# Patient Record
Sex: FEMALE | Race: BLACK OR AFRICAN AMERICAN | NOT HISPANIC OR LATINO | Employment: OTHER | URBAN - METROPOLITAN AREA
[De-identification: names, ages, dates, MRNs, and addresses within clinical notes are randomized per-mention and may not be internally consistent; named-entity substitution may affect disease eponyms.]

---

## 2021-12-30 ENCOUNTER — TELEPHONE (OUTPATIENT)
Dept: NEUROSURGERY | Facility: CLINIC | Age: 31
End: 2021-12-30
Payer: MEDICAID

## 2022-01-05 ENCOUNTER — TELEPHONE (OUTPATIENT)
Dept: NEUROLOGY | Facility: CLINIC | Age: 32
End: 2022-01-05
Payer: MEDICAID

## 2022-01-06 ENCOUNTER — TELEPHONE (OUTPATIENT)
Dept: NEUROLOGY | Facility: CLINIC | Age: 32
End: 2022-01-06
Payer: MEDICAID

## 2022-01-10 ENCOUNTER — OFFICE VISIT (OUTPATIENT)
Dept: NEUROLOGY | Facility: CLINIC | Age: 32
End: 2022-01-10
Payer: MEDICAID

## 2022-01-10 ENCOUNTER — LAB VISIT (OUTPATIENT)
Dept: LAB | Facility: HOSPITAL | Age: 32
End: 2022-01-10
Attending: PSYCHIATRY & NEUROLOGY
Payer: MEDICAID

## 2022-01-10 VITALS
HEIGHT: 65 IN | DIASTOLIC BLOOD PRESSURE: 78 MMHG | HEART RATE: 89 BPM | SYSTOLIC BLOOD PRESSURE: 112 MMHG | BODY MASS INDEX: 32.45 KG/M2

## 2022-01-10 DIAGNOSIS — E66.9 CLASS 1 OBESITY WITH BODY MASS INDEX (BMI) OF 32.0 TO 32.9 IN ADULT, UNSPECIFIED OBESITY TYPE, UNSPECIFIED WHETHER SERIOUS COMORBIDITY PRESENT: ICD-10-CM

## 2022-01-10 DIAGNOSIS — G40.211 LOCALIZATION-RELATED (FOCAL) (PARTIAL) SYMPTOMATIC EPILEPSY AND EPILEPTIC SYNDROMES WITH COMPLEX PARTIAL SEIZURES, INTRACTABLE, WITH STATUS EPILEPTICUS: ICD-10-CM

## 2022-01-10 DIAGNOSIS — G40.919 REFRACTORY EPILEPSY: ICD-10-CM

## 2022-01-10 DIAGNOSIS — G40.919 REFRACTORY EPILEPSY: Primary | ICD-10-CM

## 2022-01-10 LAB
ALBUMIN SERPL BCP-MCNC: 3.6 G/DL (ref 3.5–5.2)
ALP SERPL-CCNC: 78 U/L (ref 55–135)
ALT SERPL W/O P-5'-P-CCNC: 13 U/L (ref 10–44)
ANION GAP SERPL CALC-SCNC: 6 MMOL/L (ref 8–16)
AST SERPL-CCNC: 13 U/L (ref 10–40)
BILIRUB SERPL-MCNC: 0.2 MG/DL (ref 0.1–1)
BUN SERPL-MCNC: 9 MG/DL (ref 6–20)
CALCIUM SERPL-MCNC: 9.3 MG/DL (ref 8.7–10.5)
CHLORIDE SERPL-SCNC: 109 MMOL/L (ref 95–110)
CO2 SERPL-SCNC: 22 MMOL/L (ref 23–29)
CREAT SERPL-MCNC: 0.7 MG/DL (ref 0.5–1.4)
ERYTHROCYTE [DISTWIDTH] IN BLOOD BY AUTOMATED COUNT: 15.1 % (ref 11.5–14.5)
EST. GFR  (AFRICAN AMERICAN): >60 ML/MIN/1.73 M^2
EST. GFR  (NON AFRICAN AMERICAN): >60 ML/MIN/1.73 M^2
GLUCOSE SERPL-MCNC: 80 MG/DL (ref 70–110)
HCT VFR BLD AUTO: 39.9 % (ref 37–48.5)
HGB BLD-MCNC: 12.6 G/DL (ref 12–16)
MCH RBC QN AUTO: 26.8 PG (ref 27–31)
MCHC RBC AUTO-ENTMCNC: 31.6 G/DL (ref 32–36)
MCV RBC AUTO: 85 FL (ref 82–98)
PLATELET # BLD AUTO: 243 K/UL (ref 150–450)
PMV BLD AUTO: 10.9 FL (ref 9.2–12.9)
POTASSIUM SERPL-SCNC: 4.2 MMOL/L (ref 3.5–5.1)
PROT SERPL-MCNC: 7.4 G/DL (ref 6–8.4)
RBC # BLD AUTO: 4.7 M/UL (ref 4–5.4)
SODIUM SERPL-SCNC: 137 MMOL/L (ref 136–145)
WBC # BLD AUTO: 5.59 K/UL (ref 3.9–12.7)

## 2022-01-10 PROCEDURE — 1159F PR MEDICATION LIST DOCUMENTED IN MEDICAL RECORD: ICD-10-PCS | Mod: CPTII,,, | Performed by: PSYCHIATRY & NEUROLOGY

## 2022-01-10 PROCEDURE — 1160F RVW MEDS BY RX/DR IN RCRD: CPT | Mod: CPTII,,, | Performed by: PSYCHIATRY & NEUROLOGY

## 2022-01-10 PROCEDURE — 3078F DIAST BP <80 MM HG: CPT | Mod: CPTII,,, | Performed by: PSYCHIATRY & NEUROLOGY

## 2022-01-10 PROCEDURE — 80053 COMPREHEN METABOLIC PANEL: CPT | Performed by: PSYCHIATRY & NEUROLOGY

## 2022-01-10 PROCEDURE — 3078F PR MOST RECENT DIASTOLIC BLOOD PRESSURE < 80 MM HG: ICD-10-PCS | Mod: CPTII,,, | Performed by: PSYCHIATRY & NEUROLOGY

## 2022-01-10 PROCEDURE — 80201 ASSAY OF TOPIRAMATE: CPT | Performed by: PSYCHIATRY & NEUROLOGY

## 2022-01-10 PROCEDURE — 3008F PR BODY MASS INDEX (BMI) DOCUMENTED: ICD-10-PCS | Mod: CPTII,,, | Performed by: PSYCHIATRY & NEUROLOGY

## 2022-01-10 PROCEDURE — 99999 PR PBB SHADOW E&M-EST. PATIENT-LVL III: ICD-10-PCS | Mod: PBBFAC,,, | Performed by: PSYCHIATRY & NEUROLOGY

## 2022-01-10 PROCEDURE — 3008F BODY MASS INDEX DOCD: CPT | Mod: CPTII,,, | Performed by: PSYCHIATRY & NEUROLOGY

## 2022-01-10 PROCEDURE — 85027 COMPLETE CBC AUTOMATED: CPT | Performed by: PSYCHIATRY & NEUROLOGY

## 2022-01-10 PROCEDURE — 99205 PR OFFICE/OUTPT VISIT, NEW, LEVL V, 60-74 MIN: ICD-10-PCS | Mod: S$PBB,,, | Performed by: PSYCHIATRY & NEUROLOGY

## 2022-01-10 PROCEDURE — 1160F PR REVIEW ALL MEDS BY PRESCRIBER/CLIN PHARMACIST DOCUMENTED: ICD-10-PCS | Mod: CPTII,,, | Performed by: PSYCHIATRY & NEUROLOGY

## 2022-01-10 PROCEDURE — 36415 COLL VENOUS BLD VENIPUNCTURE: CPT | Performed by: PSYCHIATRY & NEUROLOGY

## 2022-01-10 PROCEDURE — 3074F SYST BP LT 130 MM HG: CPT | Mod: CPTII,,, | Performed by: PSYCHIATRY & NEUROLOGY

## 2022-01-10 PROCEDURE — 99213 OFFICE O/P EST LOW 20 MIN: CPT | Mod: PBBFAC | Performed by: PSYCHIATRY & NEUROLOGY

## 2022-01-10 PROCEDURE — 80177 DRUG SCRN QUAN LEVETIRACETAM: CPT | Performed by: PSYCHIATRY & NEUROLOGY

## 2022-01-10 PROCEDURE — 3074F PR MOST RECENT SYSTOLIC BLOOD PRESSURE < 130 MM HG: ICD-10-PCS | Mod: CPTII,,, | Performed by: PSYCHIATRY & NEUROLOGY

## 2022-01-10 PROCEDURE — 99999 PR PBB SHADOW E&M-EST. PATIENT-LVL III: CPT | Mod: PBBFAC,,, | Performed by: PSYCHIATRY & NEUROLOGY

## 2022-01-10 PROCEDURE — 99205 OFFICE O/P NEW HI 60 MIN: CPT | Mod: S$PBB,,, | Performed by: PSYCHIATRY & NEUROLOGY

## 2022-01-10 PROCEDURE — 1159F MED LIST DOCD IN RCRD: CPT | Mod: CPTII,,, | Performed by: PSYCHIATRY & NEUROLOGY

## 2022-01-10 RX ORDER — TOPIRAMATE 200 MG/1
200 TABLET ORAL 2 TIMES DAILY
COMMUNITY
Start: 2021-12-14

## 2022-01-10 RX ORDER — LEVETIRACETAM 1000 MG/1
1000 TABLET ORAL 2 TIMES DAILY
COMMUNITY
Start: 2021-12-14

## 2022-01-10 RX ORDER — MIDAZOLAM 5 MG/.1ML
1 SPRAY NASAL EVERY 10 MIN PRN
Qty: 4 EACH | Refills: 3 | Status: SHIPPED | OUTPATIENT
Start: 2022-01-10

## 2022-01-10 RX ORDER — IBUPROFEN 800 MG/1
800 TABLET ORAL 3 TIMES DAILY PRN
Status: ON HOLD | COMMUNITY
Start: 2021-12-02 | End: 2022-02-07 | Stop reason: HOSPADM

## 2022-01-10 NOTE — PATIENT INSTRUCTIONS
You came to epilepsy clinic because of refractory seizures despite multiple anti-seizure medications. You are interested in getting more information about Epilepsy Surgery. I think you are a good candidate for a possible surgical intervention.  I would like you to meet with our neurosurgeons to discuss the possible surgical interventions so that you understand what kind of procedures are performed and possible risks and benefits.  I would like you to meet our epilepsy  who will help guide you through this process.  Please go to the lab and get drug levels on the way out.     We will need a copy of the images of the MRI brain performed on 10/2020 (at [facility]) and 6/2019 (at [facility]). You can call the facilities that performed these MRIs, talk with the radiology department, and ask for a disk with the images. Please drop these images off at this clinic or mail them to me.      When you talk with the neurosurgeon, she will discuss risks and benefits of the surgery. Because your language is on the left, you may experience some language deficits which may be temporary or permanent. You may also experience worsening of you memory. However, we hope to significantly lower your risk of these complications by first offering a laser ablation of the area which is a significantly less invasive procedure with less associated risks when compared to resection.     At times you have cluster seizures. Try nayzilam spray for seizures lasting more than 3 minutes or more than 3 seizures in a day.     We will discuss you case further at surgical conference on 1/14. You will need to meet with the neurosurgeon before we can proceed.     Do not miss any doses of your medication. If you do miss a dose, take it as soon as you remember even if that means doubling up on the dose. Please get regular sleep. Go to sleep at the same time and wake up at the same time every day. People with epilepsy require more sleep than people without  epilepsy.  Sleeping 10-12 hours a day can be normal for a person with epilepsy.  Give yourself the time you need to get enough sleep. Every seizure you have makes it harder to prevent the next seizure. Epilepsy is associated with SUDEP, or sudden unexpected death in epilepsy.  The risk is significantly higher if your convulsive seizures are not well controlled.    Per [place] law, no episodes of loss of consciousness for 6 months before you may drive.  Please avoid dangerous situations.  For example, no baths/pools by yourself, no heights, no power tools.  Please wear a bike helmet.  If you have a single breakthrough seizure, please patient portal me or call the office and we will decide the next steps. If you have multiple seizures in a row and do not return back to baseline, 911 needs to be called.     Raya Vasquez MD PhD  Neurology-Epilepsy  [facility].

## 2022-01-10 NOTE — LETTER
January 10, 2022      [facility] - Neurology 7th Fl  [address]  [facility] [address]  Phone: [phone]  Fax: [phone]       Patient: Carolyn Mccullough   YOB: 1990  Date of Visit: 01/10/2022    To Whom It May Concern:    Carolyn Mccullough  was at [facility] on 01/10/2022. The patient may return to work with no restrictions. If you have any questions or concerns, or if I can be of further assistance, please do not hesitate to contact me.    Sincerely,    Raya Vasquez MD
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
## 2022-01-10 NOTE — LETTER
January 10, 2022      [facility] - Neurology 7th Fl  [address]  [facility] [address]  Phone: [phone]  Fax: [phone]       Patient: Carolyn Mccullough   YOB: 1990  Date of Visit: 01/10/2022    To Whom It May Concern:    María Mccullough  was at [facility] on 01/10/2022. She was accompanied by her mother, Karina. If you have any questions or concerns, or if I can be of further assistance, please do not hesitate to contact me.    Sincerely,    Raya Vasquez MD

## 2022-01-12 LAB
LEVETIRACETAM SERPL-MCNC: 9.4 UG/ML (ref 3–60)
TOPIRAMATE SERPL-MCNC: 12.5 MCG/ML

## 2022-01-13 ENCOUNTER — TELEPHONE (OUTPATIENT)
Dept: NEUROLOGY | Facility: CLINIC | Age: 32
End: 2022-01-13
Payer: MEDICAID

## 2022-01-13 NOTE — TELEPHONE ENCOUNTER
Placed phone call to patient at [phone] and mother answered.  She reports this is her mother who is currently at work. SW noted he would call [phone] number and mother stated that is the number to call.     Sw called [phone] and a man answered and stated this was not her number.  BERNARD noted the correct number in the system and dialed.

## 2022-01-14 ENCOUNTER — DOCUMENTATION ONLY (OUTPATIENT)
Dept: NEUROLOGY | Facility: CLINIC | Age: 32
End: 2022-01-14
Payer: MEDICAID

## 2022-01-14 DIAGNOSIS — G40.919 REFRACTORY EPILEPSY: ICD-10-CM

## 2022-01-14 PROCEDURE — 99367 TEAM CONF W/O PAT BY PHYS: CPT | Mod: S$PBB,,, | Performed by: PSYCHIATRY & NEUROLOGY

## 2022-01-14 PROCEDURE — 99367 PR TEAM CONFERENCE NON-FACE-TO-FACE PHYSICIAN: ICD-10-PCS | Mod: S$PBB,,, | Performed by: PSYCHIATRY & NEUROLOGY

## 2022-01-14 NOTE — PROGRESS NOTES
Epilepsy Surgery Conference    Attendees:  BRITTANY Handy MD     Date   2022   MRN  [id number]   Name  Carolyn Mccullough     Gender  Female     (Age)   1990 (32yo)   follows w/ Dr. Orellana outpatient; clinic visit with Pedro 01/10/2022   Allergies  Carbamazepine, ?iodinated contrast media possibly causing rash during sEE admission    Physical Exam Normal    BMI  32.45 kg/m²     PMH  None    AEDs Current AED/SEs:  1. Topiramate 200 mg twice daily SE none  (Level 1/10/22 12.5)  2. Levetiracetam 1000 mg twice daily SE none (Level 1/10/22 9.4)   Failed AEDs Previous AED/SEs or reason for DC.   1. Valproic acid  2. Brivaracetam 50 mg twice daily -> hallucinations  3. Vimpat   4. Carbamazepine -> rash    Compliance  adherence: no missed doses   Age of onset   (19yo)   Epilepsy History no family history of seizures, no CNS infections, No childhood seizures, no head strike with LOC, term repeat C section with no issue and no prolonged hospitalization   Semiology Auras: not always with a warning, may have a sensation that a seizure is going to happen consisting of having sensation of seeing actions before they occur; sometimes will be standing and just fall out with no warning     Seizure Events:   1. Oral automatisms (grimacing, teeth grinding, hypersalivation), staring, eyes rolling, which may progress into generalized convulsion with tongue bite; when from sleep associated with an ictal cry     2020 Neuropsychological evaluation: Carolyn presented with diffuse cognitive impairment including deficits in problem solving, reasoning, and comprehension.  These skills were below a 2018 evaluation and her preillness functioning.  Current testing resulted in her difficulties with activities of daily living suggests impaired intellectual abilities.  Carolyn also exhibits deficits in  nonlateralizing cognitive skills including working memory and processing speed.  In contrast, Carolyn showed deficits in word finding, comprehension of language, and use of grammar.  These difficulty suggests greater left hemisphere involvement, particularly Broca's area.  Learning and memory information has improved from the 2018 assessment and her verbal and visual abilities were similarly developed.  For instance, she exhibited an intact ability to learn and retain rote, simplified information but these abilities declined when information increased in complexity.  This pattern suggests that working memory deficits limit her ability to learn lengthier information.  Visual motor skills were relatively strong although impairments were noted in visual spatial judgment and fine motor coordination and speed.  Finally this evaluation did not find emotional or behavioral concerns which suggest improvements over time.     sEEG cfxeji04/29/2020-removed 11/01/2020, discharged 11/05/2020:  Leads placed in 1. Left amygdala (AMG), left hippocampus (HIP), left superior temporal gyrus (STEMP), left superior temporal gyrus2 (STEMP2), left inferior temporal (INTEMP), left posterior temporal (PTEMP) left posterior temporal (PTEMP2).  Interictal epileptiform activity: 1. There is near continuous delta slowing with superimposed fast activity and embedded epileptiform spikes noted over hippocampal area (HIP1,2). 2. There are long runs of sharp waves intermixed with delta slowing noted over the amygdala (AMG 1,2).  Frequent spike waves maximal over superior temporal leads (STEMP1) electrode contacts 2-5.  This activity becomes more rhythmic and periodic in that latter half of the recording.4.  There are intermittent short runs of spike and slow wave noted over inferior temporal area (INTEMP 2-3).  New discharges appeared over PTEMP2 1-3, which were frequent in appearance and often seen in bursts or runs of activity.  Interpretation:   These findings are suggestive of left-sided epileptogenic cerebral dysfunction noted broadly within the left hippocampus and posterior temporal regions as evidence by frequent interictal activity over these cortical regions as well as seizures that localized to these areas.  The attenuation of interictal activity over the HIP 1-2 leads several seconds before clinical and more obvious electrographic onset, could indicate early involvement of that region in the seizure onset.  However at the DC shift noted over the PTEMP1 leads could also indicate an area of epileptogenic onset.    Based on the entirety of this 4 day intracranial EEG monitoring in the multitude of seizures captured, it is hypothesized that the ictal onset zone lays in close proximity to HIP1-2 and PTEMP 1-4.  This patient was discussed with an epilepsy surgical conference, and the decision was made to offer the patient a left anterior temporal lobectomy with amygdalohippocampectomy. (raw seeg personally reviewed)     Leads removed on 11/01/2020 after patient pulled out 4 of 7 needs during an episode of postictal confusion.  She did develop fevers, and a workup was performed including CXR, blood in urine cultures, and a CT Head w/wo contrast.  These were overall unrevealing.  She was started on empiric vancomycin and cefepime by the PICU for her fevers, which subsequently resolved.  Wound Care was consulted for a left back fold wound and wound care was initiated.  Her incision sites from her leads remain c/d/i, with no evidence of leak.  She remained afebrile for 24 hours off antibiotics, and was deemed stable for discharge home.  On 11/04/2020, new and worsening bilateral lower extremity and patchy lower upper extremity redness and swelling without a discrete rash concerning for possible cellulitis.  Dermatology and PICU were consulted, and they were concerned for possible DRESS syndrome, although she has been on her current AEDs for some time.  Given  the complexity of her care and need for further allergy and immunology evaluation, the decision was made to transfer her to an adult hospital for further management of her medical issues.  At this point, there are no further neurosurgical needs in she requires medical management of her bilateral lower extremity edema and erythema.  She was transferred to the hospitalist service at [facility].     MRI brain w/ 10/22/2020 and- [facility]brainlab there is mild parietal predominant cerebral atrophy, moderate diffuse cerebellar atrophy and partially empty sella with pituitary flattened along the floor of the sella.  No additional significant MR abnormality or abnormal enhancement of the intracranial contents is evident.  The ventricles are normal in size and configuration.  No acute intracranial hemorrhage, extra-axial collection, midline shift or herniation is evident.  The paranasal sinuses and mastoid air cells are clear.  The orbits are intact.  There is mild calvarial thickening.  Impression:  Cerebral and cerebellar atrophy with calvarial thickening compatible with chronic sequelae of epilepsy and antiepileptic medications.  No acute intracranial abnormality.     Functional brain MRI 3/2020:  Functional imaging demonstrated left hemispheric dominance for language     EM[facility]:  03/17/2020-03/18/2020; 8-9 hz PDR, persistent low-voltage delta and theta slowing was appreciated broadly over the left temporal region.  At times, this slowing appears rhythmic in nature (TIRDA).  Epileptiform spikes and sharp waves were also appreciated over the left temporal region maximally seen in the T3/T5 leads.  Rarely, volume conduction of these discharges were noted to involve the right parasagittal region.  These discharges were noted to wax and wane in frequency, most notably seen in drowsiness and sleep.  No seizures captured.  Interictal SPECT scan performed 03/17/2020.  Impression:  This is an abnormal 21  hour prolonged EEG with accompanying video monitoring in wakefulness, drowsiness, and sleep due to the following features: 1. Persistent slowing over the left temporal region, frequently noted to be rhythmic in nature (TIRDA), 2. Epileptiform discharges noted maximally over the left mid-temporal region, seen most frequently in drowsiness and sleep.  Interpretation:  These findings are consistent with a focal area of potentially epileptogenic cerebral dysfunction noted broadly over the left mid-temporal region.  There were no seizures noted during this day in the EEG recording.  The patient did have a seizure on 03/16/2020.  Interictal SPECT scan radionucleotide injection was performed during this day of the EEG recording.  Compared to the previous day's study, the slowing noted over the left hemisphere has noted to become more pronounced and persistent.     PET scan 08/14/2019 ([place]):  Unremarkable metabolic brain study in     [facility] 06/23/2020:  Left hemispheric language and memory lateralization (anesthetic agent used: propofol) -> the results of the intracarotid diprivan (modified [place]) test are difficult to interpret considering prolonged generalized sedating propofol (diprivan) effects as compared to previous experience with amobarbital.  Interpretable portions of the test showed left hemispheric language and memory lateralization.     MRI brain 06/17/2019:  Persistent atrophy of bilateral parietal occipital lobes, unchanged; increased frontotemporal and cerebellar atrophy compared to previous studies; no acute intracranial abnormality     [facility] 07/16/2018-07/20/2018:  Two left temporal focal onset seizures with alteration of awareness, abundant focal left mid temporal interictal epileptiform activity, less frequent independent right temporal interictal epileptiform activity     EEG 06/09/2016 - normal     MRI brain 06/09/2016 w w/o small left frontal vascular finding consistent with benign venous  developmental abnormality.  Benign empty sella.       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 Orellana. sEEG with consistent onset in the left mesial temporal structures however broad zone of abnormal activity extending across left temporal lobe structures.  Functional testing lateralized dominant hemisphere to the left with significant baseline deficits. Copies of the MRI images from 2019 and 2020 uploaded and revewed.  Plan:  Reasonable to proceed with left mesial temporal laser ablation considering significant involvement in mesial temporal structures with less, albeit persistent, risk associated with deficits of ipsilateral dominant speech/memory function.      Raya Vasquez MD PhD  Neurology-Epilepsy  [facility].

## 2022-01-18 ENCOUNTER — TELEPHONE (OUTPATIENT)
Dept: NEUROSURGERY | Facility: CLINIC | Age: 32
End: 2022-01-18
Payer: MEDICAID

## 2022-01-18 DIAGNOSIS — G40.211 LOCALIZATION-RELATED (FOCAL) (PARTIAL) SYMPTOMATIC EPILEPSY AND EPILEPTIC SYNDROMES WITH COMPLEX PARTIAL SEIZURES, INTRACTABLE, WITH STATUS EPILEPTICUS: Primary | ICD-10-CM

## 2022-01-19 ENCOUNTER — TELEPHONE (OUTPATIENT)
Dept: NEUROSURGERY | Facility: CLINIC | Age: 32
End: 2022-01-19
Payer: MEDICAID

## 2022-01-19 DIAGNOSIS — G40.211 LOCALIZATION-RELATED (FOCAL) (PARTIAL) SYMPTOMATIC EPILEPSY AND EPILEPTIC SYNDROMES WITH COMPLEX PARTIAL SEIZURES, INTRACTABLE, WITH STATUS EPILEPTICUS: Primary | ICD-10-CM

## 2022-01-19 DIAGNOSIS — Z01.818 PRE-OP TESTING: ICD-10-CM

## 2022-01-19 DIAGNOSIS — G40.211 PARTIAL SYMPTOMATIC EPILEPSY WITH COMPLEX PARTIAL SEIZURES, INTRACTABLE, WITH STATUS EPILEPTICUS: Primary | ICD-10-CM

## 2022-01-20 ENCOUNTER — HOSPITAL ENCOUNTER (OUTPATIENT)
Dept: RADIOLOGY | Facility: HOSPITAL | Age: 32
Discharge: HOME | End: 2022-01-20
Attending: NEUROLOGICAL SURGERY
Payer: MEDICAID

## 2022-01-20 ENCOUNTER — TELEPHONE (OUTPATIENT)
Dept: PREADMISSION TESTING | Facility: HOSPITAL | Age: 32
End: 2022-01-20
Payer: MEDICAID

## 2022-01-20 ENCOUNTER — CLINICAL SUPPORT (OUTPATIENT)
Dept: OPHTHALMOLOGY | Facility: CLINIC | Age: 32
End: 2022-01-20
Payer: MEDICAID

## 2022-01-20 ENCOUNTER — OFFICE VISIT (OUTPATIENT)
Dept: NEUROSURGERY | Facility: CLINIC | Age: 32
End: 2022-01-20
Payer: MEDICAID

## 2022-01-20 DIAGNOSIS — G40.211 LOCALIZATION-RELATED (FOCAL) (PARTIAL) SYMPTOMATIC EPILEPSY AND EPILEPTIC SYNDROMES WITH COMPLEX PARTIAL SEIZURES, INTRACTABLE, WITH STATUS EPILEPTICUS: ICD-10-CM

## 2022-01-20 DIAGNOSIS — G40.211 PARTIAL SYMPTOMATIC EPILEPSY WITH COMPLEX PARTIAL SEIZURES, INTRACTABLE, WITH STATUS EPILEPTICUS: ICD-10-CM

## 2022-01-20 DIAGNOSIS — Z01.818 PREOPERATIVE TESTING: Primary | ICD-10-CM

## 2022-01-20 DIAGNOSIS — G40.919 REFRACTORY EPILEPSY: ICD-10-CM

## 2022-01-20 PROCEDURE — 99205 PR OFFICE/OUTPT VISIT, NEW, LEVL V, 60-74 MIN: ICD-10-PCS | Mod: 57,S$PBB,, | Performed by: NEUROLOGICAL SURGERY

## 2022-01-20 PROCEDURE — 70552 MRI BRAIN STEM W/DYE: CPT | Mod: 26,,, | Performed by: RADIOLOGY

## 2022-01-20 PROCEDURE — 70552 MRI BRAIN STEM W/DYE: CPT | Mod: TC

## 2022-01-20 PROCEDURE — 99999 PR PBB SHADOW E&M-EST. PATIENT-LVL I: CPT | Mod: PBBFAC,,, | Performed by: NEUROLOGICAL SURGERY

## 2022-01-20 PROCEDURE — 99999 PR PBB SHADOW E&M-EST. PATIENT-LVL I: ICD-10-PCS | Mod: PBBFAC,,, | Performed by: NEUROLOGICAL SURGERY

## 2022-01-20 PROCEDURE — 99205 OFFICE O/P NEW HI 60 MIN: CPT | Mod: 57,S$PBB,, | Performed by: NEUROLOGICAL SURGERY

## 2022-01-20 PROCEDURE — A9585 GADOBUTROL INJECTION: HCPCS | Performed by: NEUROLOGICAL SURGERY

## 2022-01-20 PROCEDURE — 70552 MRI BRAIN STEALTH WITHOUT FIDUCIALS: ICD-10-PCS | Mod: 26,,, | Performed by: RADIOLOGY

## 2022-01-20 PROCEDURE — 25500020 PHARM REV CODE 255: Performed by: NEUROLOGICAL SURGERY

## 2022-01-20 PROCEDURE — 99211 OFF/OP EST MAY X REQ PHY/QHP: CPT | Mod: PBBFAC,25 | Performed by: NEUROLOGICAL SURGERY

## 2022-01-20 RX ORDER — GADOBUTROL 604.72 MG/ML
10 INJECTION INTRAVENOUS
Status: COMPLETED | OUTPATIENT
Start: 2022-01-20 | End: 2022-01-20

## 2022-01-20 RX ADMIN — GADOBUTROL 10 ML: 604.72 INJECTION INTRAVENOUS at 02:01

## 2022-01-20 NOTE — PROGRESS NOTES
HVF done. Rel/fix/fair od good os coop. Good ou./chart checked for latex allergy. 3.25/od 3.25/os-smh

## 2022-01-20 NOTE — TELEPHONE ENCOUNTER
----- Message from Enid Mayer RN sent at 1/20/2022  9:45 AM CST -----  Surgery 1/31      other appts 1/20  Please schedule  as per Dr. Iraheta, poc appt, labs and ua.( if able add some to 1/20, but put rest all on same day, lives out of town)    Thanks!

## 2022-01-20 NOTE — PROGRESS NOTES
Neurosurgery  History & Physical    SUBJECTIVE:     Chief Complaint: intractable epilepsy     History of Present Illness:  Carolyn Mccullough is a 31 y.o. right-handed female who presents as a referral from Dr. Kyle Stapleton for intractable left temporal lobe epilepsy. The patient has undergone extensive workup, including sEEG electrode placement at [facility] here in [facility]  that demonstrated all seizures with a mesial temporal onset. I have personally reviewed the EEG data (together with Chelo Stapleton and Pedro) from that time with the patient's permission, and I have also reviewed the patient's intracranial electrode placement plan and re-modeled it in DAMIAN. Today is my first time actually meeting the patient. Please see Dr. Vasquez's notes for extensive history review and thorough reporting of outside hospital records. She has failed multiple previous AEDs.     The patient presents with her mother and her daughter, who is developmentally delayed. The patient's mother, Karina, provides most of the history. The family lives together in [place], [place], which is approximately 2 hours away.     Karina reports that Carolyn experiences about 3 events/months presently. She endorses 2 semiologies: in the first, Carolyn's eyes roll back, she then experiences all-over shaking. She also sometimes has staring with drooling episodes.     Seizure onset was in . They initially started while she was asleep, but now happen at all times of day. They are now moreso when awake than asleep, which is particularly difficult because the seizures interfere with normal activities, such as shopping.     The patient was born via  at term. Karina denies any childhood infections, trauma, or illness. Carolyn graduated from high school and finished one semester of college. She is not currently working; last worked in  in the [employer]    Her goal for surgery is to reduce seizures.      Empty sella configuration noted [facility], so I asked the patient about IIH symptoms: she endorses occasional headaches, but usually only after seizures. There is no headache associated with exertional activities otherwise.    The patient denies any bleeding, infectious, or anesthetic complications with any previous surgery.  She had an allergic reaction to CT contrast and also to tegretol. She is not currently taking ibuprofen.     Review of patient's allergies indicates:   Allergen Reactions    Carbamazepine Rash    Iodinated contrast media Dermatitis    Escitalopram Rash       Current Outpatient Medications   Medication Sig Dispense Refill    ibuprofen (ADVIL,MOTRIN) 800 MG tablet Take 800 mg by mouth 3 (three) times daily as needed.      levETIRAcetam (KEPPRA) 1000 MG tablet Take 1,000 mg by mouth 2 (two) times daily.      midazolam (NAYZILAM) 5 mg/spray (0.1 mL) Spry 1 spray by Nasal route every 10 (ten) minutes as needed (cluster seizures). Please administer 1 spray after seizures lasting more than 3 minutes or more that 3 seizures in a 24 hour period, may administer a second dose after 10 min if there is no response to the first dose. Use for treatment of no more than 1 episode (1-2 sprays) every three days, no more than 5 episodes (1-2 sprays) in a month 4 each 3    topiramate (TOPAMAX) 200 MG Tab Take 200 mg by mouth 2 (two) times daily.       No current facility-administered medications for this visit.       Past Medical History:   Diagnosis Date    Seizures      Past Surgical History:   Procedure Laterality Date    BRAIN SURGERY      sEEG left [facility]     SECTION      TUBAL LIGATION       Family History    None       Social History     Socioeconomic History    Marital status: Legally    Tobacco Use    Smoking status: Never Smoker    Smokeless tobacco: Never Used   Substance and Sexual Activity    Alcohol use: No        Review of Systems   Constitutional: Negative for fever.   HENT: Negative for nosebleeds.    Eyes: Negative for visual disturbance.   Respiratory: Negative for shortness of breath.    Cardiovascular: Negative for chest pain.   Gastrointestinal: Negative for nausea and vomiting.   Endocrine: Negative for cold intolerance.   Genitourinary: Negative for difficulty urinating.   Musculoskeletal: Negative for neck pain.   Skin: Negative for color change.   Neurological: Positive for seizures.   Hematological: Does not bruise/bleed easily.   Psychiatric/Behavioral: Negative for dysphoric mood. The patient is not nervous/anxious.        OBJECTIVE:     Vital Signs     There is no height or weight on file to calculate BMI.      Physical Exam:    Constitutional: She appears well-developed and well-nourished. No distress.     Eyes: EOM are normal.     Abdominal: Soft.     Skin: Skin displays no rash on extremities. Skin displays no lesions on extremities.   Scalp inspected; difficult to appreciate any scars from previous sEEG placement      Psych/Behavior: She is alert. She is oriented to person, place, and time.     Musculoskeletal:        Right Upper Extremities: Muscle strength is 5/5.        Left Upper Extremities: Muscle strength is 5/5.       Right Lower Extremities: Muscle strength is 5/5.        Left Lower Extremities: Muscle strength is 5/5.     Neurological:        Coordination: She has normal finger to nose coordination.     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  female who presents as a referral from Dr. Stapleton to discuss definitive surgery for treatment of intractable epilepsy. She has undergone extensive workup at [facility], and Dr. Stapleton has shared all relevant records with us, including raw sEEG data and neuropsycholgical evaluation. We have also directly obtained the images, including post-sEEG CT head, to assess the electrode placement.     Based on these data, together with the additional imaging findings, and after extensive interdisciplinary discussion in conference and with Dr. Stapleton, I have offered the patient a choice between laser ablation of the amygdala and hippocampus or anterior temporal lobectomy. 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 There is a growing body of literature suggesting that encephaloceles are potentially epileptogenic; however, without actually having an electrode in the region, I cannot be sure if it is contributory in her case.     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 We discussed that this will carry an increased risk of neuropsychological risk, particularly with respect to speech. The patient and her mother expressed understanding. After deliberation, they prefer to proceed with KERLINE.     I explained that this would require us to remove a window of bone over the anterior temporal lobe, resect the anterior portion of the temporal neocortex, working to the point of the temporal horn and then resecting the mesial temporal structures and encephalocele.      We discussed risks, benefits, and alternatives to the surgery. Risks include, but are not limited to, bleeding, pain, infection, scarring, need for  further/repeat procedure, death, paralysis, damage to visual fields, speech changes/aphasia, memory deficits, stroke/damage to major blood vessels, cranial nerve deficits, other neurologic deficits, leak of cerebrospinal fluid, and hardware-related complications. Informed consent was obtained of the patient with her mother present.       We discussed that my partner Dr. Tracy will also be involved in the surgical process. We will also obtain visual field testing prior to OR.     I have encouraged the patient to contact the clinic in interim with any questions, concerns, or adverse clinical change.     I spent over an hour on this encounter, more than half in direct consultation.

## 2022-01-20 NOTE — H&P (VIEW-ONLY)
Neurosurgery  History & Physical    SUBJECTIVE:     Chief Complaint: intractable epilepsy     History of Present Illness:  Carolyn Mccullough is a 31 y.o. right-handed female who presents as a referral from Dr. Kyle Stapleton for intractable left temporal lobe epilepsy. The patient has undergone extensive workup, including sEEG electrode placement at [facility] here in [facility]  that demonstrated all seizures with a mesial temporal onset. I have personally reviewed the EEG data (together with Chelo Stapleton and Pedro) from that time with the patient's permission, and I have also reviewed the patient's intracranial electrode placement plan and re-modeled it in DAMIAN. Today is my first time actually meeting the patient. Please see Dr. Vasquez's notes for extensive history review and thorough reporting of outside hospital records. She has failed multiple previous AEDs.     The patient presents with her mother and her daughter, who is developmentally delayed. The patient's mother, Karina, provides most of the history. The family lives together in [place], [place], which is approximately 2 hours away.     Karina reports that Carolyn experiences about 3 events/months presently. She endorses 2 semiologies: in the first, Carolyn's eyes roll back, she then experiences all-over shaking. She also sometimes has staring with drooling episodes.     Seizure onset was in . They initially started while she was asleep, but now happen at all times of day. They are now moreso when awake than asleep, which is particularly difficult because the seizures interfere with normal activities, such as shopping.     The patient was born via  at term. Karina denies any childhood infections, trauma, or illness. Carolyn graduated from high school and finished one semester of college. She is not currently working; last worked in  in the [employer]    Her goal for surgery is to reduce seizures.      Empty sella configuration noted [facility], so I asked the patient about IIH symptoms: she endorses occasional headaches, but usually only after seizures. There is no headache associated with exertional activities otherwise.    The patient denies any bleeding, infectious, or anesthetic complications with any previous surgery.  She had an allergic reaction to CT contrast and also to tegretol. She is not currently taking ibuprofen.     Review of patient's allergies indicates:   Allergen Reactions    Carbamazepine Rash    Iodinated contrast media Dermatitis    Escitalopram Rash       Current Outpatient Medications   Medication Sig Dispense Refill    ibuprofen (ADVIL,MOTRIN) 800 MG tablet Take 800 mg by mouth 3 (three) times daily as needed.      levETIRAcetam (KEPPRA) 1000 MG tablet Take 1,000 mg by mouth 2 (two) times daily.      midazolam (NAYZILAM) 5 mg/spray (0.1 mL) Spry 1 spray by Nasal route every 10 (ten) minutes as needed (cluster seizures). Please administer 1 spray after seizures lasting more than 3 minutes or more that 3 seizures in a 24 hour period, may administer a second dose after 10 min if there is no response to the first dose. Use for treatment of no more than 1 episode (1-2 sprays) every three days, no more than 5 episodes (1-2 sprays) in a month 4 each 3    topiramate (TOPAMAX) 200 MG Tab Take 200 mg by mouth 2 (two) times daily.       No current facility-administered medications for this visit.       Past Medical History:   Diagnosis Date    Seizures      Past Surgical History:   Procedure Laterality Date    BRAIN SURGERY      sEEG left [facility]     SECTION      TUBAL LIGATION       Family History    None       Social History     Socioeconomic History    Marital status: Legally    Tobacco Use    Smoking status: Never Smoker    Smokeless tobacco: Never Used   Substance and Sexual Activity    Alcohol use: No        Review of Systems   Constitutional: Negative for fever.   HENT: Negative for nosebleeds.    Eyes: Negative for visual disturbance.   Respiratory: Negative for shortness of breath.    Cardiovascular: Negative for chest pain.   Gastrointestinal: Negative for nausea and vomiting.   Endocrine: Negative for cold intolerance.   Genitourinary: Negative for difficulty urinating.   Musculoskeletal: Negative for neck pain.   Skin: Negative for color change.   Neurological: Positive for seizures.   Hematological: Does not bruise/bleed easily.   Psychiatric/Behavioral: Negative for dysphoric mood. The patient is not nervous/anxious.        OBJECTIVE:     Vital Signs     There is no height or weight on file to calculate BMI.      Physical Exam:    Constitutional: She appears well-developed and well-nourished. No distress.     Eyes: EOM are normal.     Abdominal: Soft.     Skin: Skin displays no rash on extremities. Skin displays no lesions on extremities.   Scalp inspected; difficult to appreciate any scars from previous sEEG placement      Psych/Behavior: She is alert. She is oriented to person, place, and time.     Musculoskeletal:        Right Upper Extremities: Muscle strength is 5/5.        Left Upper Extremities: Muscle strength is 5/5.       Right Lower Extremities: Muscle strength is 5/5.        Left Lower Extremities: Muscle strength is 5/5.     Neurological:        Coordination: She has normal finger to nose coordination.     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  female who presents as a referral from Dr. Stapleton to discuss definitive surgery for treatment of intractable epilepsy. She has undergone extensive workup at [facility], and Dr. Stapleton has shared all relevant records with us, including raw sEEG data and neuropsycholgical evaluation. We have also directly obtained the images, including post-sEEG CT head, to assess the electrode placement.     Based on these data, together with the additional imaging findings, and after extensive interdisciplinary discussion in conference and with Dr. Stapleton, I have offered the patient a choice between laser ablation of the amygdala and hippocampus or anterior temporal lobectomy. 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 There is a growing body of literature suggesting that encephaloceles are potentially epileptogenic; however, without actually having an electrode in the region, I cannot be sure if it is contributory in her case.     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 We discussed that this will carry an increased risk of neuropsychological risk, particularly with respect to speech. The patient and her mother expressed understanding. After deliberation, they prefer to proceed with KERLINE.     I explained that this would require us to remove a window of bone over the anterior temporal lobe, resect the anterior portion of the temporal neocortex, working to the point of the temporal horn and then resecting the mesial temporal structures and encephalocele.      We discussed risks, benefits, and alternatives to the surgery. Risks include, but are not limited to, bleeding, pain, infection, scarring, need for  further/repeat procedure, death, paralysis, damage to visual fields, speech changes/aphasia, memory deficits, stroke/damage to major blood vessels, cranial nerve deficits, other neurologic deficits, leak of cerebrospinal fluid, and hardware-related complications. Informed consent was obtained of the patient with her mother present.       We discussed that my partner Dr. Tracy will also be involved in the surgical process. We will also obtain visual field testing prior to OR.     I have encouraged the patient to contact the clinic in interim with any questions, concerns, or adverse clinical change.     I spent over an hour on this encounter, more than half in direct consultation.

## 2022-01-21 ENCOUNTER — TELEPHONE (OUTPATIENT)
Dept: NEUROSURGERY | Facility: CLINIC | Age: 32
End: 2022-01-21
Payer: MEDICAID

## 2022-01-21 ENCOUNTER — DOCUMENTATION ONLY (OUTPATIENT)
Dept: NEUROLOGY | Facility: CLINIC | Age: 32
End: 2022-01-21
Payer: MEDICAID

## 2022-01-21 DIAGNOSIS — G40.919 REFRACTORY EPILEPSY: ICD-10-CM

## 2022-01-21 PROCEDURE — 99367 PR TEAM CONFERENCE NON-FACE-TO-FACE PHYSICIAN: ICD-10-PCS | Mod: S$PBB,,, | Performed by: PSYCHIATRY & NEUROLOGY

## 2022-01-21 PROCEDURE — 99367 TEAM CONF W/O PAT BY PHYS: CPT | Mod: S$PBB,,, | Performed by: PSYCHIATRY & NEUROLOGY

## 2022-01-21 NOTE — TELEPHONE ENCOUNTER
I spoke with the pt mother Karina who stated the pt would like to move forward with Open Temporal Lobectomy.  I VU and inform   ----- Message from Adolfo Woody sent at 1/21/2022  3:53 PM CST -----  Contact: mother  Pt mom was told to speak w/ nurse when decision abt surgery was made    Call back @[phone]

## 2022-01-21 NOTE — PROGRESS NOTES
Epilepsy Surgery Conference    Attendees:  BRITTANY Henderson MD       Date  2022 - representation with new imaging findings   2022   MRN [id number]   Name  Carolyn Mccullough     Gender  Female     (Age)  1990 (30yo)   follows w/ Dr. Orellana outpatient; clinic visit with Pedro 01/10/2022   Allergies  Carbamazepine, ?iodinated contrast media possibly causing rash during sEE admission    Physical Exam Normal    BMI  32.45 kg/m²     PMH  None    AEDs Current AED/SEs:  1. Topiramate 200 mg twice daily SE none  (Level 1/10/22 12.5)  2. Levetiracetam 1000 mg twice daily SE none (Level 1/10/22 9.4)   Failed AEDs Previous AED/SEs or reason for DC.   1. Valproic acid  2. Brivaracetam 50 mg twice daily -> hallucinations  3. Vimpat   4. Carbamazepine -> rash    Compliance  adherence: no missed doses   Age of onset   (17yo)   Epilepsy History no family history of seizures, no CNS infections, No childhood seizures, no head strike with LOC, term repeat C section with no issue and no prolonged hospitalization   Semiology Auras: not always with a warning, may have a sensation that a seizure is going to happen consisting of having sensation of seeing actions before they occur; sometimes will be standing and just fall out with no warning     Seizure Events:   1. Oral automatisms (grimacing, teeth grinding, hypersalivation), staring, eyes rolling, which may progress into generalized convulsion with tongue bite; when from sleep associated with an ictal cry     2022 MRI Brain Stealth without Fiducials  Patient return for 2 additional sequences.  Axial T2 SPACE with multiplanar reconstructions and a oblique coronal high-resolution T2 sequence. There is asymmetric enlargement of the left foramen ovale.  Focal extension of the inferomedial most temporal lobe  into this inferomedial left temporal lobe herniates into this site (i.e.  Sequence 10 image 11, sequence 1028 image 43).  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  However, there is no overt parenchymal distortion or signal abnormality at this site.  Clinical correlation is required. Imaging findings reviewed when case was discussed at epilepsy interdisciplinary conference on 01/21/2022.    06/19/2020 Neuropsychological evaluation: Carolyn presented with diffuse cognitive impairment including deficits in problem solving, reasoning, and comprehension.  These skills were below a 2018 evaluation and her preillness functioning.  Current testing resulted in her difficulties with activities of daily living suggests impaired intellectual abilities.  Carolyn also exhibits deficits in nonlateralizing cognitive skills including working memory and processing speed.  In contrast, Carolyn showed deficits in word finding, comprehension of language, and use of grammar.  These difficulty suggests greater left hemisphere involvement, particularly Broca's area.  Learning and memory information has improved from the 2018 assessment and her verbal and visual abilities were similarly developed.  For instance, she exhibited an intact ability to learn and retain rote, simplified information but these abilities declined when information increased in complexity.  This pattern suggests that working memory deficits limit her ability to learn lengthier information.  Visual motor skills were relatively strong although impairments were noted in visual spatial judgment and fine motor coordination and speed.  Finally this evaluation did not find emotional or behavioral concerns which suggest improvements over time.     sEEG kshbpz01/29/2020-removed 11/01/2020, discharged 11/05/2020:  Leads placed in 1. Left amygdala (AMG), left hippocampus (HIP), left superior temporal gyrus (STEMP), left superior temporal gyrus2 (STEMP2), left  inferior temporal (INTEMP), left posterior temporal (PTEMP) left posterior temporal (PTEMP2).  Interictal epileptiform activity: 1. There is near continuous delta slowing with superimposed fast activity and embedded epileptiform spikes noted over hippocampal area (HIP1,2). 2. There are long runs of sharp waves intermixed with delta slowing noted over the amygdala (AMG 1,2).  Frequent spike waves maximal over superior temporal leads (STEMP1) electrode contacts 2-5.  This activity becomes more rhythmic and periodic in that latter half of the recording.4.  There are intermittent short runs of spike and slow wave noted over inferior temporal area (INTEMP 2-3).  New discharges appeared over PTEMP2 1-3, which were frequent in appearance and often seen in bursts or runs of activity.  Interpretation:  These findings are suggestive of left-sided epileptogenic cerebral dysfunction noted broadly within the left hippocampus and posterior temporal regions as evidence by frequent interictal activity over these cortical regions as well as seizures that localized to these areas.  The attenuation of interictal activity over the HIP 1-2 leads several seconds before clinical and more obvious electrographic onset, could indicate early involvement of that region in the seizure onset.  However at the DC shift noted over the PTEMP1 leads could also indicate an area of epileptogenic onset.    Based on the entirety of this 4 day intracranial EEG monitoring in the multitude of seizures captured, it is hypothesized that the ictal onset zone lays in close proximity to HIP1-2 and PTEMP 1-4.  This patient was discussed with an epilepsy surgical conference, and the decision was made to offer the patient a left anterior temporal lobectomy with amygdalohippocampectomy. (raw seeg personally reviewed)     Leads removed on 11/01/2020 after patient pulled out 4 of 7 needs during an episode of postictal confusion.  She did develop fevers, and a workup  was performed including CXR, blood in urine cultures, and a CT Head w/wo contrast.  These were overall unrevealing.  She was started on empiric vancomycin and cefepime by the PICU for her fevers, which subsequently resolved.  Wound Care was consulted for a left back fold wound and wound care was initiated.  Her incision sites from her leads remain c/d/i, with no evidence of leak.  She remained afebrile for 24 hours off antibiotics, and was deemed stable for discharge home.  On 11/04/2020, new and worsening bilateral lower extremity and patchy lower upper extremity redness and swelling without a discrete rash concerning for possible cellulitis.  Dermatology and PICU were consulted, and they were concerned for possible DRESS syndrome, although she has been on her current AEDs for some time.  Given the complexity of her care and need for further allergy and immunology evaluation, the decision was made to transfer her to an adult hospital for further management of her medical issues.  At this point, there are no further neurosurgical needs in she requires medical management of her bilateral lower extremity edema and erythema.  She was transferred to the hospitalist service at [facility].     MRI brain w/ 10/22/2020 and- [facility]/brainlab there is mild parietal predominant cerebral atrophy, moderate diffuse cerebellar atrophy and partially empty sella with pituitary flattened along the floor of the sella.  No additional significant MR abnormality or abnormal enhancement of the intracranial contents is evident.  The ventricles are normal in size and configuration.  No acute intracranial hemorrhage, extra-axial collection, midline shift or herniation is evident.  The paranasal sinuses and mastoid air cells are clear.  The orbits are intact.  There is mild calvarial thickening.  Impression:  Cerebral and cerebellar atrophy with calvarial thickening compatible with chronic sequelae of epilepsy and antiepileptic medications.  No  acute intracranial abnormality.     Functional brain MRI 3/2020:  Functional imaging demonstrated left hemispheric dominance for language     [facility]:  03/17/2020-03/18/2020; 8-9 hz PDR, persistent low-voltage delta and theta slowing was appreciated broadly over the left temporal region.  At times, this slowing appears rhythmic in nature (TIRDA).  Epileptiform spikes and sharp waves were also appreciated over the left temporal region maximally seen in the T3/T5 leads.  Rarely, volume conduction of these discharges were noted to involve the right parasagittal region.  These discharges were noted to wax and wane in frequency, most notably seen in drowsiness and sleep.  No seizures captured.  Interictal SPECT scan performed 03/17/2020.  Impression:  This is an abnormal 21 hour prolonged EEG with accompanying video monitoring in wakefulness, drowsiness, and sleep due to the following features: 1. Persistent slowing over the left temporal region, frequently noted to be rhythmic in nature (TIRDA), 2. Epileptiform discharges noted maximally over the left mid-temporal region, seen most frequently in drowsiness and sleep.  Interpretation:  These findings are consistent with a focal area of potentially epileptogenic cerebral dysfunction noted broadly over the left mid-temporal region.  There were no seizures noted during this day in the EEG recording.  The patient did have a seizure on 03/16/2020.  Interictal SPECT scan radionucleotide injection was performed during this day of the EEG recording.  Compared to the previous day's study, the slowing noted over the left hemisphere has noted to become more pronounced and persistent.     PET scan 08/14/2019 ([place]):  Unremarkable metabolic brain study in     [facility] 06/23/2020:  Left hemispheric language and memory lateralization (anesthetic agent used: propofol) -> the results of the intracarotid diprivan (modified [place]) test are difficult to  interpret considering prolonged generalized sedating propofol (diprivan) effects as compared to previous experience with amobarbital.  Interpretable portions of the test showed left hemispheric language and memory lateralization.     MRI brain 06/17/2019:  Persistent atrophy of bilateral parietal occipital lobes, unchanged; increased frontotemporal and cerebellar atrophy compared to previous studies; no acute intracranial abnormality     [facility] 07/16/2018-07/20/2018:  Two left temporal focal onset seizures with alteration of awareness, abundant focal left mid temporal interictal epileptiform activity, less frequent independent right temporal interictal epileptiform activity     EEG 06/09/2016 - normal     MRI brain 06/09/2016 w w/o small left frontal vascular finding consistent with benign venous developmental abnormality.  Benign empty sella.       Family support Mom is very supportive      Imaging:  Results for orders placed during the hospital encounter of 01/20/22    MRI Brain Stealth without Fiducials    Addendum 1/21/2022  4:12 PM  Patient return for 2 additional sequences.  Axial T2 SPACE with multiplanar reconstructions and a oblique coronal high-resolution T2 sequence.    There is asymmetric enlargement of the left foramen ovale.  Focal extension of the inferomedial most temporal lobe into this inferomedial left temporal lobe herniates into this site (i.e.  Sequence 10 image 11, sequence 1028 image 43).  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  However, there is no overt parenchymal distortion or signal abnormality at this site.  Clinical correlation is required.    Imaging findings reviewed when case was discussed at epilepsy interdisciplinary conference on 01/21/2022.      Electronically signed by: Ian Jones MD  Date:    01/21/2022  Time:    16:09    Impression  Stealth protocol MR examination performed for purposes of procedure localization.    Postsurgical change prior stereo EEG  placement.    Cerebellar atrophy.    Empty sella configuration.  Nonspecific finding but has been associated with idiopathic intracranial hypertension (pseudotumor cerebri) for clinical correlation.  No definite associated temporal encephalocele, but dedicated imaging could be performed warranted clinically.      Electronically signed by: Ian Jones MD  Date:    01/20/2022  Time:    14:23    Impression:   31-year-old woman with refractory left temporal verses temporal + epilepsy referred to us for surgical evaluation by Dr. Orellana. sEEG with consistent onset in the left mesial temporal structures however broad zone of abnormal activity extending across left temporal lobe structures.  Functional testing lateralized dominant hemisphere to the left with significant baseline deficits. Copies of the MRI images from 2019 and 2020 uploaded and reviewed. 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  Plan:  With new finding of a temporal lobe encephalocele, plan discussed with the patient's primary epileptologist and the patient.  Offer made to proceed with a left anterior temporal lobectomy acknowledging that there is some risk of temporary and possibly even permanent injury to the ipsilateral dominant speech/memory function. Alternatively, if the patient decides to pursue more conservative management, we will also offer a left mesial temporal laser ablation with a plan for subsequent surgical workup if epileptic seizures are not adequately controlled with this more limited procedure.  The patient will discuss with her family before deciding which surgical procedure she would like to move forward with.  We let her know that we are available for any questions or concerns.      Raya Vasquez MD PhD  Neurology-Epilepsy  [facility].

## 2022-01-23 NOTE — PATIENT INSTRUCTIONS
Please use the Bactroban ointment twice daily in your nose for 5 days leading up to surgery. (You may use a Q-tip to apply a little bit of ointment inside each nostril.)    Please use the Hibiclens soap every other day in the shower for the 5 days before your surgery. For example, if surgery is on Monday, use the Hibiclens when you shower on Thursday, Saturday, and Monday morning.     We are asking you to do this to help reduce the chance of having an infection associated with surgery. Thank you!     Please do not take any blood thinners, including ibuprofen and aspirin, for one week prior to surgery.

## 2022-01-24 ENCOUNTER — TELEPHONE (OUTPATIENT)
Dept: PREADMISSION TESTING | Facility: HOSPITAL | Age: 32
End: 2022-01-24
Payer: MEDICAID

## 2022-01-24 NOTE — TELEPHONE ENCOUNTER
----- Message from Enid Mayer RN sent at 1/24/2022  3:13 PM CST -----  Surgery1/21  other appts 1/27  No show for ua on 1/20.  Please reschedule ua .  Thanks!

## 2022-01-25 ENCOUNTER — TELEPHONE (OUTPATIENT)
Dept: NEUROSURGERY | Facility: CLINIC | Age: 32
End: 2022-01-25
Payer: MEDICAID

## 2022-01-25 DIAGNOSIS — G40.211 LOCALIZATION-RELATED (FOCAL) (PARTIAL) SYMPTOMATIC EPILEPSY AND EPILEPTIC SYNDROMES WITH COMPLEX PARTIAL SEIZURES, INTRACTABLE, WITH STATUS EPILEPTICUS: Primary | ICD-10-CM

## 2022-01-27 ENCOUNTER — HOSPITAL ENCOUNTER (OUTPATIENT)
Dept: PREADMISSION TESTING | Facility: HOSPITAL | Age: 32
Discharge: HOME | End: 2022-01-27
Attending: NEUROLOGICAL SURGERY
Payer: MEDICAID

## 2022-01-27 ENCOUNTER — OFFICE VISIT (OUTPATIENT)
Dept: INTERNAL MEDICINE | Facility: CLINIC | Age: 32
End: 2022-01-27
Payer: MEDICAID

## 2022-01-27 VITALS
WEIGHT: 261 LBS | BODY MASS INDEX: 43.49 KG/M2 | HEIGHT: 65 IN | OXYGEN SATURATION: 98 % | SYSTOLIC BLOOD PRESSURE: 112 MMHG | RESPIRATION RATE: 16 BRPM | DIASTOLIC BLOOD PRESSURE: 74 MMHG | TEMPERATURE: 98 F | HEART RATE: 70 BPM

## 2022-01-27 DIAGNOSIS — T50.905A DRESS SYNDROME: ICD-10-CM

## 2022-01-27 DIAGNOSIS — I10 HYPERTENSION, UNSPECIFIED TYPE: ICD-10-CM

## 2022-01-27 DIAGNOSIS — Z87.898 HISTORY OF PREDIABETES: ICD-10-CM

## 2022-01-27 DIAGNOSIS — G40.919 REFRACTORY EPILEPSY: ICD-10-CM

## 2022-01-27 DIAGNOSIS — R60.9 EDEMA, UNSPECIFIED TYPE: ICD-10-CM

## 2022-01-27 DIAGNOSIS — D72.12 DRESS SYNDROME: ICD-10-CM

## 2022-01-27 DIAGNOSIS — E66.01 MORBID OBESITY: ICD-10-CM

## 2022-01-27 PROCEDURE — 99204 PR OFFICE/OUTPT VISIT, NEW, LEVL IV, 45-59 MIN: ICD-10-PCS | Mod: S$PBB,,, | Performed by: HOSPITALIST

## 2022-01-27 PROCEDURE — 99999 PR PBB SHADOW E&M-EST. PATIENT-LVL I: ICD-10-PCS | Mod: PBBFAC,,, | Performed by: HOSPITALIST

## 2022-01-27 PROCEDURE — 1160F PR REVIEW ALL MEDS BY PRESCRIBER/CLIN PHARMACIST DOCUMENTED: ICD-10-PCS | Mod: CPTII,,, | Performed by: HOSPITALIST

## 2022-01-27 PROCEDURE — 1159F PR MEDICATION LIST DOCUMENTED IN MEDICAL RECORD: ICD-10-PCS | Mod: CPTII,,, | Performed by: HOSPITALIST

## 2022-01-27 PROCEDURE — 99204 OFFICE O/P NEW MOD 45 MIN: CPT | Mod: S$PBB,,, | Performed by: HOSPITALIST

## 2022-01-27 PROCEDURE — 99999 PR PBB SHADOW E&M-EST. PATIENT-LVL I: CPT | Mod: PBBFAC,,, | Performed by: HOSPITALIST

## 2022-01-27 PROCEDURE — 99211 OFF/OP EST MAY X REQ PHY/QHP: CPT | Mod: PBBFAC | Performed by: HOSPITALIST

## 2022-01-27 PROCEDURE — 1159F MED LIST DOCD IN RCRD: CPT | Mod: CPTII,,, | Performed by: HOSPITALIST

## 2022-01-27 PROCEDURE — 1160F RVW MEDS BY RX/DR IN RCRD: CPT | Mod: CPTII,,, | Performed by: HOSPITALIST

## 2022-01-27 RX ORDER — ACETAMINOPHEN 500 MG
500 TABLET ORAL DAILY PRN
COMMUNITY

## 2022-01-27 NOTE — ASSESSMENT & PLAN NOTE
\    Edema- I suggested avoidance of added salt,avoidance of NSAID's, unless advised or ordered  and suggested Limb elevation and falguni hose use

## 2022-01-27 NOTE — ASSESSMENT & PLAN NOTE
Has been having weight problem since seizures   Also during the COVID pandemic and staying home   Currently not working due to seizures   Seizure frequency once a month - once every other month     Weight related conditions     Known to have     HTN       Not troubled with / Not known to have       Type 2 Diabetes   Hyperlipidemia   Sleep apnea   Acid reflux   Fatty liver   Osteoarthritis    Encouraged weight loss

## 2022-01-27 NOTE — ASSESSMENT & PLAN NOTE
In the past   In her early 20's   Cannot recall the Medication that she took at that time  Stopped BP medication many years ago     Home BP readings -None  Recent BP readings in the record-  Vitals - 1 value per visit 1/10/2022 1/27/2022 1/27/2022   SYSTOLIC 112 109 112   DIASTOLIC 78 72 74   No dizziness   Urinating well   Hypertension-  Blood pressure is acceptable . I suggest  blood pressure  monitoring .I suggest addressing pain control as uncontrolled pain can increased blood pressure

## 2022-01-27 NOTE — PROGRESS NOTES
[facility]  Progress Note    Patient Name: Carolyn Mccullough  MRN: [id number]  Date of Evaluation- 01/27/2022  PCP- Fadumo Laughlin MD    Future cases for Carolyn Mccullough [[id number]]     Case ID Status Date Time José Procedure Provider Location    [id number] [facility] 1/31/2022  7:00  TEMPORAL LOBECTOMY WITH BRAINLAB LEFT Mayra Iraheta MD [[id number]] NOM OR 2ND FLR          HPI:  History of present illness- I had the pleasure of meeting this pleasant 31 y.o. lady in the pre op clinic prior to her elective  Neuro  surgery. The patient is new to me . Carolyn GAINES was accompanied by uncle Pascual.    I have obtained the history by speaking to the patient and by reviewing the electronic health records.    Events leading up to surgery / History of presenting illness -    Has been having Seizures since age 18   Increasing over time   Tried different Medication   Not helping   Could not tell precipitating reasons for seizures   No troublesome headaches     Relevant health conditions of significance for the perioperative period/ History of presenting illness -    Subjectively describes health as good     Health conditions of significance for the perioperative period       - Morbid obesity   - HTN      Spoke to mother on the Phone   She needs to be supervised while cooking   She got burned  on her Rt hand  in the past when she had a seizure while cooking   She calls/ lets her mother know when has shower  She does not lock her bath room door   Mother has access to her apartment    She went through her pregnancy in 2010 fairly  well   Daughter has Autism at  age 10 Y  She had tubal ligation when she had C section with her daughter      Not known to have heart disease , Diabetes Mellitus, Lung disease      Lives with her 10 year daughter  Ground level apartment complex   Mother lives next door  Maternal aunty lives close by   Mother coming to help    Goes to Primary care         Subjective/ Objective:     Chief  complaint-Preoperative evaluation, Perioperative Medical management, complication reduction plan     Active cardiac conditions- none    Revised cardiac risk index predictors- none    Functional capacity -Examples of physical activity, House work, cares for daughter,  can take 1 flight of stairs----- She can undertake all the above activities without  chest pain,chest tightness, Shortness of breath ,dizziness,lightheadedness making her exercise tolerance more,   than 4 Mets.     Review of Systems   Constitutional: Negative for chills and fever.        Weight stable       HENT:        STOPBANG score 1 / 8       BMI> 35        Eyes:        No new visual changes   Respiratory:        No cough , phlegm    No Hemoptysis   Cardiovascular:        As noted   Gastrointestinal:        No overt GI/ blood losses  Bowel movements- Regular / daily  Cycles are regular   LMP last month  Uses Condom   Male partner  Not pregnant    Endocrine:        Prednisone use > 20 mg daily for 3 weeks- None   Genitourinary: Negative for dysuria.        No urinary hesitancy    Musculoskeletal:        Had Rt hand injury a few months ago when landed on her Rt had with a seizure   Had evaluation for the Rt hand ion the Emergency room   As per her , no fracture  Rt hand pain better   Not needing Medication    Skin: Negative for rash.   Neurological: Negative for syncope.        No unilateral weakness   Hematological:        Current use of Anticoagulants  None   Takes BC powder - none this year   Psychiatric/Behavioral:        No Depression,Anxiety       No vascular stenting     No past medical history pertinent negatives.  No family history on file.      No anesthesia, bleeding, cardiac , PONVproblems with previous surgeries/procedures.  Medications and Allergies reviewed in epic.     FH- No anesthesia,bleeding / venous thrombosis ,problems  in family     Physical Exam    Vitals - 1 value per visit 1/27/2022   SYSTOLIC 112   DIASTOLIC 74   Pulse 70    Temp 98.4   Resp 16   SPO2 98   Weight (lb) 261   Weight (kg) 118.389   Height 65   BMI (Calculated) 43.4     I offered a gown and the presence of a chaperone during physical examination   She was comfortable to proceed with the exam without the the presence of a chaperone       Physical Exam  Constitutional-   General appearance-Conscious,Coherent  Eyes- No conjunctival icterus,pupils  round  and reactive to light   ENT-Oral cavity- moist  , Hearing grossly normal   Neck- No thyromegaly ,Trachea -central, No jugular venous distension,   No Carotid Bruit   Cardiovascular -Heart Sounds- Normal  and  no murmur   , No gallop rhythm   Respiratory - Normal Respiratory Effort, Normal breath sounds,  no wheeze  and  no forced expiratory wheeze    Peripheral pitting pedal edema-- mild, no calf pain   Gastrointestinal -Soft abdomen, No palpable masses, Non Tender,Liver,Spleen not palpable. No-- free fluid and shifting dullness  Musculoskeletal- No finger Clubbing. Strength grossly normal   Lymphatic-No Palpable cervical, axillary,Inguinal lymphadenopathy   Psychiatric - normal effect,Orientation  Rt Dorsalis pedis pulses-palpable    Lt Dorsalis pedis pulses- palpable   Rt Posterior tibial pulses -palpable   Left posterior tibial pulses -palpable   Miscellaneous -  no renal bruit  Investigations  Lab and Imaging have been reviewed in [facility].    Review of Medicine tests    EKG- I had independently reviewed the EKG from--  It was reported to be showing     Review of clinical lab tests:  Lab Results   Component Value Date    CREATININE 0.7 01/10/2022    HGB 12.6 01/10/2022     01/10/2022     2020       1. Normal left ventricular systolic and diastolic functions with LVEF >55%.   2. Normal right ventricular systolic function.   3. Normal LV filling pressure at rest.   4. Normal calculated LV strain (-20%        Review of old records- Was done and information gathered regards to events leading to surgery and health  conditions of significance in the perioperative period.        Preoperative cardiac risk assessment-  The patient does not have any active cardiac conditions . Revised cardiac risk index predictors- 0---.Functional capacity is more than 4 Mets. She will be undergoing a Neuro  procedure that carries a Moderate Risk risk     Risk of a major Cardiac event ( Defined as death, myocardial infarction, or cardiac arrest at 30 days after noncardiac surgery), based on RCRI score     3.9%         No further cardiac work up is indicated prior to proceeding with the surgery     Orders Placed This Encounter    Hemoglobin A1C       American Society of Anesthesiologists Physical status classification ( ASA ) class: 2     Postoperative pulmonary complication risk assessment:      ARISCAT ( Canet) risk index- risk class -  Low       Assessment/Plan:     DRESS syndrome  Has a history of skin rash with Tegretol that has since resolved   None recently     Refractory epilepsy  For surgery    Morbid obesity  Has been having weight problem since seizures   Also during the COVID pandemic and staying home   Currently not working due to seizures   Seizure frequency once a month - once every other month     Weight related conditions     Known to have     HTN       Not troubled with / Not known to have       Type 2 Diabetes   Hyperlipidemia   Sleep apnea   Acid reflux   Fatty liver   Osteoarthritis    Encouraged weight loss    HTN (hypertension)  In the past   In her early 20's   Cannot recall the Medication that she took at that time  Stopped BP medication many years ago     Home BP readings -None  Recent BP readings in the record-  Vitals - 1 value per visit 1/10/2022 1/27/2022 1/27/2022   SYSTOLIC 112 109 112   DIASTOLIC 78 72 74   No dizziness   Urinating well   Hypertension-  Blood pressure is acceptable . I suggest  blood pressure  monitoring .I suggest addressing pain control as uncontrolled pain can increased blood pressure     History  of prediabetes  A1c was 6 in 2020   Prediabetes- I suggest monitoring the glucose in the perioperative period as  glucose may be high from stress hyperglycemia in which case Insulin may be required    Hemoglobin A1c in the pre diabetic range   Weight loss with diet and exercise , if able helps lower A1c  Increased risk of becoming a diabetic   Needs follow up       Gee 10 th 2022- glucose Normal   As per her , does not have Diabetes    Offered A1c  Spoke to mother - Ordered A1c    Edema  \    Edema- I suggested avoidance of added salt,avoidance of NSAID's, unless advised or ordered  and suggested Limb elevation and falguni hose use        Preventive perioperative care    Thromboembolic prophylaxis:  Her risk factors for thrombosis include surgical procedure and age.I suggest  thromboembolic prophylaxis ( mechanical/pharmacological, weighing the risk benefits of pharmacological agent use considering tete procedural bleeding )  during the perioperative period.I suggested being active in the post operative period.      Postoperative pulmonary complication prophylaxis-Risk factors for post operative pulmonary complications include ASA class >2- I suggest incentive spirometry use, early ambulation and end tidal carbon dioxide monitoring  , oral care , head end of bed elevation      Renal complication prophylaxis-. I suggest keeping her well hydrated in the perioperative period.    Surgical site Infection Prophylaxis-I  suggest appropriate antibiotic for Prophylaxis against Surgical site infections  No reported Staph infection  Skin antibacterial discussed         This visit was focused on Preoperative evaluation, Perioperative Medical management, complication reduction plans. I suggest that the patient follows up with primary care or relevant sub specialists for ongoing health care.    I appreciate the opportunity to be involved in this patients care. Please feel free to contact me if there were any questions about this  consultation.    Patient is optimized    Patient  was instructed to call and update me about any changes to health,  medication, office visits ,testing out side of the tete operative care center , hospitalizations between now and surgery      Chanelle Manuel MD  Internal Medicine  [facility]   Cell Phone- [phone]    History of COVID - no   COVID vaccination status -3    COVID screening     No fever   No cough   No SOB  No sore throat   No loss of taste or smell   No muscle aches   No nausea, vomiting , diarrhea -    Discussed EKG- They deferred   --  1/27/2022- 18 03     A1c 5,7 in the Prediabetic range   Prediabetes- I suggest monitoring the glucose in the perioperative period as  glucose may be high from stress hyperglycemia in which case Insulin may be required    Hemoglobin A1c in the pre diabetic range   Weight loss with diet and exercise , if able helps lower A1c  Increased risk of becoming a diabetic   Needs follow up     Urinalysis showed , no suggestions of UTI   --  1/27/2022- 18 18     Called and discussed with mother about Prediabetes   Hydration     Called to follow up , spoke to mother  to address any concerns with the up coming surgery or any questions on Medication instructions -  Doing well ,No changes to Medication, Health -

## 2022-01-27 NOTE — HPI
History of present illness- I had the pleasure of meeting this pleasant 31 y.o. lady in the pre op clinic prior to her elective  Neuro  surgery. The patient is new to me . Carolyn GAINES was accompanied by uncle Pascual.    I have obtained the history by speaking to the patient and by reviewing the electronic health records.    Events leading up to surgery / History of presenting illness -    Has been having Seizures since age 18   Increasing over time   Tried different Medication   Not helping   Could not tell precipitating reasons for seizures   No troublesome headaches     Relevant health conditions of significance for the perioperative period/ History of presenting illness -    Subjectively describes health as good     Health conditions of significance for the perioperative period       - Morbid obesity   - HTN      Spoke to mother on the Phone   She needs to be supervised while cooking   She got burned  on her Rt hand  in the past when she had a seizure while cooking   She calls/ lets her mother know when has shower  She does not lock her bath room door   Mother has access to her apartment    She went through her pregnancy in 2010 fairly  well   Daughter has Autism at  age 10 Y  She had tubal ligation when she had C section with her daughter      Not known to have heart disease , Diabetes Mellitus, Lung disease      Lives with her 10 year daughter  Ground level apartment complex   Mother lives next door  Maternal aunty lives close by   Mother coming to help    Goes to Primary care

## 2022-01-27 NOTE — DISCHARGE INSTRUCTIONS
Your surgery has been scheduled for:__________________________________________    You should report to:  ____[facility], located on the [place] side of the first floor of the           [facility] ([phone])  ____[facility], located on the [facility] side of the            Second floor of the [facility] [phone])  ____3rd Floor [facility] located on the [facility] [address] side of the [facility] [phone]  ____[facility]: located at [address] [place] [address]. Check in on the first floor of the hospital.  Please Note   - Tell your doctor if you take Aspirin, products containing Aspirin, herbal medications  or blood thinners, such as Coumadin, Ticlid, or Plavix.  (Consult your provider regarding holding or stopping before surgery).  - Arrange for someone to drive you home following surgery.  You will not be allowed to leave the surgical facility alone or drive yourself home following sedation and anesthesia.  Before Surgery  - Stop taking all herbal medications 14 days prior to surgery  - No Motrin/Advil (Ibuprofen)  1 day before surgery  - No Aleve (Naproxen) 7 days before surgery  - Stop Taking Asprin, products containing Asprin _____days before surgery  - Stop taking blood thinners_______days before surgery  - No Goody's/BC  Powder 7 days before surgery  - Refrain from drinking alcoholic beverages for 24hours before and after surgery  - Stop or limit smoking _________days before surgery  - You may take Tylenol for pain  Night before Surgery  - Do not eat or drink after midnight  - Take a shower or bath (shower is recommended).  Bathe with Hibiclens soap or an antibacterial soap from the neck down.  If not supplied by your surgeon, hibiclens soap will need to be purchased over the counter in pharmacy.  Rinse soap off thoroughly.  - Shampoo your hair with your regular  shampoo  The Day of Surgery  - Take another bath or shower with hibiclens or any antibacterial soap, to reduce the chance of infection.  - Take heart and blood pressure medications with a small sip of water, as advised by the perioperative team.  - Do not take fluid pills  - You may brush your teeth and rinse your mouth, but do not swall any additional water.   - Do not apply perfumes, powder, body lotions or deodorant on the day of surgery.  - Nail polish should be removed.  - Do not wear makeup or moisturizer  - Wear comfortable clothes, such as a button front shirt and loose fitting pants.  - Leave all jewelry, including body piercings, and valuables at home.    - Bring any devices you will neeed after surgery such as crutches or canes.  - If you have sleep apnea, please bring your CPAP machine  In the event that your physical condition changes including the onset of a cold or respiratory illness, or if you have to delay or cancel your surgery, please notify your surgeon.     Anesthesia: General Anesthesia     You are watched continuously during your procedure by your anesthesia provider.     Youre due to have surgery. During surgery, youll be given medicine called anesthesia or anesthetic. This will keep you comfortable and pain-free. Your anesthesia provider will use general anesthesia.  What is general anesthesia?  General anesthesia puts you into a state like deep sleep. It goes into the bloodstream (IV anesthetics), into the lungs (gas anesthetics), or both. You feel nothing during the procedure. You will not remember it. During the procedure, the anesthesia provider monitors you continuously. He or she checks your heart rate and rhythm, blood pressure, breathing, and blood oxygen.  1. IV anesthetics. IV anesthetics are given through an IV line in your arm. Theyre often given first. This is so you are asleep before a gas anesthetic is started. Some kinds of IV anesthetics relieve pain. Others relax you.  Your doctor will decide which kind is best in your case.  2. Gas anesthetics. Gas anesthetics are breathed into the lungs. They are often used to keep you asleep. They can be given through a facemask or a tube placed in your larynx or trachea (breathing tube).  ? If you have a facemask, your anesthesia provider will most likely place it over your nose and mouth while youre still awake. Youll breathe oxygen through the mask as your IV anesthetic is started. Gas anesthetic may be added through the mask.  ? If you have a tube in the larynx or trachea, it will be inserted into your throat after youre asleep.  Anesthesia tools and medicines  You will likely have:  · IV anesthetics. These are put into an IV line into your bloodstream.  · Gas anesthetics. You breathe these anesthetics into your lungs, where they pass into your bloodstream.  · Pulse oximeter. This is a small clip that is attached to the end of your finger. This measures your blood oxygen level.  · Electrocardiography leads (electrodes). These are small sticky pads that are placed on your chest. They record your heart rate and rhythm.  · Blood pressure cuff. This reads your blood pressure.  Risks and possible complications  General anesthesia has some risks. These include:  · Breathing problems  · Nausea and vomiting  · Sore throat or hoarseness (usually temporary)  · Allergic reaction to the anesthetic  · Irregular heartbeat (rare)  · Cardiac arrest (rare)   Anesthesia safety  1. Follow all instructions you are given for how long not to eat or drink before your procedure.  2. Be sure your doctor knows what medicines and drugs you take. This includes over-the-counter medicines, herbs, supplements, alcohol or other drugs. You will be asked when those were last taken.  3. Have an adult family member or friend drive you home after the procedure.  4. For the first 24 hours after your surgery:  ? Do not drive or use heavy equipment.  ? Do not make important  decisions or sign legal documents. If important decisions or signing legal documents is necessary during the first 24 hours after surgery, have a trusted family member or spouse act on your behalf.  ? Avoid alcohol.  ? Have a responsible adult stay with you. He or she can watch for problems and help keep you safe.  Date Last Reviewed: 12/1/2016  © 6298-7059 [employer] (formerly [employer]). [address] [place] [address], [place] [address]. All rights reserved. This information is not intended as a substitute for professional medical care. Always follow your healthcare professional's instructions.

## 2022-01-27 NOTE — OUTPATIENT SUBJECTIVE & OBJECTIVE
Outpatient Subjective & Objective     Chief complaint-Preoperative evaluation, Perioperative Medical management, complication reduction plan     Active cardiac conditions- none    Revised cardiac risk index predictors- none    Functional capacity -Examples of physical activity, House work, cares for daughter,  can take 1 flight of stairs----- She can undertake all the above activities without  chest pain,chest tightness, Shortness of breath ,dizziness,lightheadedness making her exercise tolerance more,   than 4 Mets.     Review of Systems   Constitutional: Negative for chills and fever.        Weight stable       HENT:        STOPBANG score 1  / 8       BMI> 35        Eyes:        No new visual changes   Respiratory:        No cough , phlegm    No Hemoptysis   Cardiovascular:        As noted   Gastrointestinal:        No overt GI/ blood losses  Bowel movements- Regular / daily  Cycles are regular   LMP last month  Uses Condom   Male partner  Not pregnant    Endocrine:        Prednisone use > 20 mg daily for 3 weeks- None   Genitourinary: Negative for dysuria.        No urinary hesitancy    Musculoskeletal:        Had Rt hand injury a few months ago when landed on her Rt had with a seizure   Had evaluation for the Rt hand ion the Emergency room   As per her , no fracture  Rt hand pain better   Not needing Medication    Skin: Negative for rash.   Neurological: Negative for syncope.        No unilateral weakness   Hematological:        Current use of Anticoagulants  None   Takes BC powder - none this year   Psychiatric/Behavioral:        No Depression,Anxiety       No vascular stenting     No past medical history pertinent negatives.  No family history on file.      No anesthesia, bleeding, cardiac , PONVproblems with previous surgeries/procedures.  Medications and Allergies reviewed in epic.     FH- No anesthesia,bleeding / venous thrombosis ,problems  in family     Physical Exam    Vitals - 1 value per visit  1/27/2022   SYSTOLIC 112   DIASTOLIC 74   Pulse 70   Temp 98.4   Resp 16   SPO2 98   Weight (lb) 261   Weight (kg) 118.389   Height 65   BMI (Calculated) 43.4     I offered a gown and the presence of a chaperone during physical examination   She was comfortable to proceed with the exam without the the presence of a chaperone       Physical Exam  Constitutional-   General appearance-Conscious,Coherent  Eyes- No conjunctival icterus,pupils  round  and reactive to light   ENT-Oral cavity- moist  , Hearing grossly normal   Neck- No thyromegaly ,Trachea -central, No jugular venous distension,   No Carotid Bruit   Cardiovascular -Heart Sounds- Normal  and  no murmur   , No gallop rhythm   Respiratory - Normal Respiratory Effort, Normal breath sounds,  no wheeze  and  no forced expiratory wheeze    Peripheral pitting pedal edema-- mild, no calf pain   Gastrointestinal -Soft abdomen, No palpable masses, Non Tender,Liver,Spleen not palpable. No-- free fluid and shifting dullness  Musculoskeletal- No finger Clubbing. Strength grossly normal   Lymphatic-No Palpable cervical, axillary,Inguinal lymphadenopathy   Psychiatric - normal effect,Orientation  Rt Dorsalis pedis pulses-palpable    Lt Dorsalis pedis pulses- palpable   Rt Posterior tibial pulses -palpable   Left posterior tibial pulses -palpable   Miscellaneous -  no renal bruit  Investigations  Lab and Imaging have been reviewed in [facility].    Review of Medicine tests    EKG- I had independently reviewed the EKG from--  It was reported to be showing     Review of clinical lab tests:  Lab Results   Component Value Date    CREATININE 0.7 01/10/2022    HGB 12.6 01/10/2022     01/10/2022     2020       1. Normal left ventricular systolic and diastolic functions with LVEF >55%.   2. Normal right ventricular systolic function.   3. Normal LV filling pressure at rest.   4. Normal calculated LV strain (-20%        Review of old records- Was done and information gathered  regards to events leading to surgery and health conditions of significance in the perioperative period.    Outpatient Subjective & Objective

## 2022-01-27 NOTE — ASSESSMENT & PLAN NOTE
A1c was 6 in 2020   Prediabetes- I suggest monitoring the glucose in the perioperative period as  glucose may be high from stress hyperglycemia in which case Insulin may be required    Hemoglobin A1c in the pre diabetic range   Weight loss with diet and exercise , if able helps lower A1c  Increased risk of becoming a diabetic   Needs follow up       Gee 10 th 2022- glucose Normal   As per her , does not have Diabetes    Offered A1c  Spoke to mother - Ordered A1c

## 2022-01-28 ENCOUNTER — TELEPHONE (OUTPATIENT)
Dept: NEUROSURGERY | Facility: CLINIC | Age: 32
End: 2022-01-28
Payer: MEDICAID

## 2022-01-28 ENCOUNTER — ANESTHESIA EVENT (OUTPATIENT)
Dept: SURGERY | Facility: HOSPITAL | Age: 32
DRG: 025 | End: 2022-01-28
Payer: MEDICAID

## 2022-01-29 NOTE — TELEPHONE ENCOUNTER
Patient contacted and her mother. Advised to arrive for surgery at 1030 for 1230pm start, DOSC, NPO after midnight the night before, shower x 2 with Hibiclens or Dial antibacterial soap. Understanding verbalized by patient.  Asked to still take epileptic medication  Pt mother VU and agreed to time of arrival

## 2022-01-31 ENCOUNTER — ANESTHESIA (OUTPATIENT)
Dept: SURGERY | Facility: HOSPITAL | Age: 32
DRG: 025 | End: 2022-01-31
Payer: MEDICAID

## 2022-01-31 ENCOUNTER — HOSPITAL ENCOUNTER (INPATIENT)
Facility: HOSPITAL | Age: 32
LOS: 8 days | Discharge: HOME | DRG: 025 | End: 2022-02-08
Attending: NEUROLOGICAL SURGERY | Admitting: NEUROLOGICAL SURGERY
Payer: MEDICAID

## 2022-01-31 DIAGNOSIS — I63.9 ISCHEMIC STROKE: ICD-10-CM

## 2022-01-31 DIAGNOSIS — I63.9 STROKE: ICD-10-CM

## 2022-01-31 DIAGNOSIS — G40.909 EPILEPSY: ICD-10-CM

## 2022-01-31 DIAGNOSIS — G40.219 LOCALIZATION-RELATED (FOCAL) (PARTIAL) SYMPTOMATIC EPILEPSY AND EPILEPTIC SYNDROMES WITH COMPLEX PARTIAL SEIZURES, INTRACTABLE, WITHOUT STATUS EPILEPTICUS: ICD-10-CM

## 2022-01-31 LAB
ABO + RH BLD: NORMAL
ANION GAP SERPL CALC-SCNC: 10 MMOL/L (ref 8–16)
BASOPHILS # BLD AUTO: 0.02 K/UL (ref 0–0.2)
BASOPHILS NFR BLD: 0.2 % (ref 0–1.9)
BLD GP AB SCN CELLS X3 SERPL QL: NORMAL
BUN SERPL-MCNC: 9 MG/DL (ref 6–20)
CALCIUM SERPL-MCNC: 7.5 MG/DL (ref 8.7–10.5)
CHLORIDE SERPL-SCNC: 113 MMOL/L (ref 95–110)
CO2 SERPL-SCNC: 15 MMOL/L (ref 23–29)
CREAT SERPL-MCNC: 0.6 MG/DL (ref 0.5–1.4)
CTP QC/QA: YES
DIFFERENTIAL METHOD: ABNORMAL
EOSINOPHIL # BLD AUTO: 0 K/UL (ref 0–0.5)
EOSINOPHIL NFR BLD: 0 % (ref 0–8)
ERYTHROCYTE [DISTWIDTH] IN BLOOD BY AUTOMATED COUNT: 14.9 % (ref 11.5–14.5)
EST. GFR  (AFRICAN AMERICAN): >60 ML/MIN/1.73 M^2
EST. GFR  (NON AFRICAN AMERICAN): >60 ML/MIN/1.73 M^2
GLUCOSE SERPL-MCNC: 119 MG/DL (ref 70–110)
GLUCOSE SERPL-MCNC: 162 MG/DL (ref 70–110)
GLUCOSE SERPL-MCNC: 84 MG/DL (ref 70–110)
HCO3 UR-SCNC: 17.2 MMOL/L (ref 24–28)
HCO3 UR-SCNC: 18.2 MMOL/L (ref 24–28)
HCT VFR BLD AUTO: 35.8 % (ref 37–48.5)
HCT VFR BLD CALC: 30 %PCV (ref 36–54)
HCT VFR BLD CALC: 31 %PCV (ref 36–54)
HGB BLD-MCNC: 11.7 G/DL (ref 12–16)
IMM GRANULOCYTES # BLD AUTO: 0.06 K/UL (ref 0–0.04)
IMM GRANULOCYTES NFR BLD AUTO: 0.5 % (ref 0–0.5)
LYMPHOCYTES # BLD AUTO: 1.6 K/UL (ref 1–4.8)
LYMPHOCYTES NFR BLD: 14.1 % (ref 18–48)
MAGNESIUM SERPL-MCNC: 2 MG/DL (ref 1.6–2.6)
MCH RBC QN AUTO: 28.2 PG (ref 27–31)
MCHC RBC AUTO-ENTMCNC: 32.7 G/DL (ref 32–36)
MCV RBC AUTO: 86 FL (ref 82–98)
MONOCYTES # BLD AUTO: 0.2 K/UL (ref 0.3–1)
MONOCYTES NFR BLD: 1.8 % (ref 4–15)
NEUTROPHILS # BLD AUTO: 9.7 K/UL (ref 1.8–7.7)
NEUTROPHILS NFR BLD: 83.4 % (ref 38–73)
NRBC BLD-RTO: 0 /100 WBC
PCO2 BLDA: 35.7 MMHG (ref 35–45)
PCO2 BLDA: 36.2 MMHG (ref 35–45)
PH SMN: 7.29 [PH] (ref 7.35–7.45)
PH SMN: 7.31 [PH] (ref 7.35–7.45)
PHOSPHATE SERPL-MCNC: 2.4 MG/DL (ref 2.7–4.5)
PLATELET # BLD AUTO: 261 K/UL (ref 150–450)
PMV BLD AUTO: 10.4 FL (ref 9.2–12.9)
PO2 BLDA: 212 MMHG (ref 80–100)
PO2 BLDA: 232 MMHG (ref 80–100)
POC BE: -8 MMOL/L
POC BE: -9 MMOL/L
POC IONIZED CALCIUM: 1.07 MMOL/L (ref 1.06–1.42)
POC IONIZED CALCIUM: 1.11 MMOL/L (ref 1.06–1.42)
POC SATURATED O2: 100 % (ref 95–100)
POC SATURATED O2: 100 % (ref 95–100)
POC TCO2: 18 MMOL/L (ref 23–27)
POC TCO2: 19 MMOL/L (ref 23–27)
POCT GLUCOSE: 148 MG/DL (ref 70–110)
POTASSIUM BLD-SCNC: 3.3 MMOL/L (ref 3.5–5.1)
POTASSIUM BLD-SCNC: 4.3 MMOL/L (ref 3.5–5.1)
POTASSIUM SERPL-SCNC: 3.9 MMOL/L (ref 3.5–5.1)
RBC # BLD AUTO: 4.15 M/UL (ref 4–5.4)
SAMPLE: ABNORMAL
SAMPLE: ABNORMAL
SARS-COV-2 AG RESP QL IA.RAPID: NEGATIVE
SODIUM BLD-SCNC: 139 MMOL/L (ref 136–145)
SODIUM BLD-SCNC: 142 MMOL/L (ref 136–145)
SODIUM SERPL-SCNC: 138 MMOL/L (ref 136–145)
WBC # BLD AUTO: 11.6 K/UL (ref 3.9–12.7)

## 2022-01-31 PROCEDURE — 63600175 PHARM REV CODE 636 W HCPCS: Performed by: STUDENT IN AN ORGANIZED HEALTH CARE EDUCATION/TRAINING PROGRAM

## 2022-01-31 PROCEDURE — 88341 PR IHC OR ICC EACH ADD'L SINGLE ANTIBODY  STAINPR: ICD-10-PCS | Mod: 26,,, | Performed by: STUDENT IN AN ORGANIZED HEALTH CARE EDUCATION/TRAINING PROGRAM

## 2022-01-31 PROCEDURE — 25000003 PHARM REV CODE 250: Performed by: STUDENT IN AN ORGANIZED HEALTH CARE EDUCATION/TRAINING PROGRAM

## 2022-01-31 PROCEDURE — 27201423 OPTIME MED/SURG SUP & DEVICES STERILE SUPPLY: Performed by: NEUROLOGICAL SURGERY

## 2022-01-31 PROCEDURE — 85025 COMPLETE CBC W/AUTO DIFF WBC: CPT | Performed by: STUDENT IN AN ORGANIZED HEALTH CARE EDUCATION/TRAINING PROGRAM

## 2022-01-31 PROCEDURE — 25000003 PHARM REV CODE 250: Performed by: NURSE ANESTHETIST, CERTIFIED REGISTERED

## 2022-01-31 PROCEDURE — 25000003 PHARM REV CODE 250: Performed by: NEUROLOGICAL SURGERY

## 2022-01-31 PROCEDURE — 88305 TISSUE EXAM BY PATHOLOGIST: ICD-10-PCS | Mod: 26,,, | Performed by: STUDENT IN AN ORGANIZED HEALTH CARE EDUCATION/TRAINING PROGRAM

## 2022-01-31 PROCEDURE — 88305 TISSUE EXAM BY PATHOLOGIST: CPT | Mod: 26,,, | Performed by: STUDENT IN AN ORGANIZED HEALTH CARE EDUCATION/TRAINING PROGRAM

## 2022-01-31 PROCEDURE — 61537 REMOVAL OF BRAIN TISSUE: CPT | Mod: 22,62,, | Performed by: NEUROLOGICAL SURGERY

## 2022-01-31 PROCEDURE — C1729 CATH, DRAINAGE: HCPCS | Performed by: NEUROLOGICAL SURGERY

## 2022-01-31 PROCEDURE — C1713 ANCHOR/SCREW BN/BN,TIS/BN: HCPCS | Performed by: NEUROLOGICAL SURGERY

## 2022-01-31 PROCEDURE — 20000000 HC ICU ROOM

## 2022-01-31 PROCEDURE — 37000009 HC ANESTHESIA EA ADD 15 MINS: Performed by: NEUROLOGICAL SURGERY

## 2022-01-31 PROCEDURE — 83735 ASSAY OF MAGNESIUM: CPT | Performed by: PHYSICIAN ASSISTANT

## 2022-01-31 PROCEDURE — 36620 PR INSERT CATH,ART,PERCUT,SHORTTERM: ICD-10-PCS | Mod: 59,,, | Performed by: ANESTHESIOLOGY

## 2022-01-31 PROCEDURE — 36000713 HC OR TIME LEV V EA ADD 15 MIN: Performed by: NEUROLOGICAL SURGERY

## 2022-01-31 PROCEDURE — 61781 SCAN PROC CRANIAL INTRA: CPT | Mod: ,,, | Performed by: NEUROLOGICAL SURGERY

## 2022-01-31 PROCEDURE — 71000033 HC RECOVERY, INTIAL HOUR: Performed by: NEUROLOGICAL SURGERY

## 2022-01-31 PROCEDURE — 88342 IMHCHEM/IMCYTCHM 1ST ANTB: CPT | Mod: 26,,, | Performed by: STUDENT IN AN ORGANIZED HEALTH CARE EDUCATION/TRAINING PROGRAM

## 2022-01-31 PROCEDURE — 63600175 PHARM REV CODE 636 W HCPCS: Performed by: NEUROLOGICAL SURGERY

## 2022-01-31 PROCEDURE — 61781 PR STEREOTACTIC COMP ASSIST PROC,CRANIAL,INTRADURAL: ICD-10-PCS | Mod: ,,, | Performed by: NEUROLOGICAL SURGERY

## 2022-01-31 PROCEDURE — 84100 ASSAY OF PHOSPHORUS: CPT | Performed by: PHYSICIAN ASSISTANT

## 2022-01-31 PROCEDURE — 63600175 PHARM REV CODE 636 W HCPCS: Performed by: NURSE ANESTHETIST, CERTIFIED REGISTERED

## 2022-01-31 PROCEDURE — 86920 COMPATIBILITY TEST SPIN: CPT | Performed by: NEUROLOGICAL SURGERY

## 2022-01-31 PROCEDURE — 36620 INSERTION CATHETER ARTERY: CPT | Mod: 59,,, | Performed by: ANESTHESIOLOGY

## 2022-01-31 PROCEDURE — 80048 BASIC METABOLIC PNL TOTAL CA: CPT | Performed by: STUDENT IN AN ORGANIZED HEALTH CARE EDUCATION/TRAINING PROGRAM

## 2022-01-31 PROCEDURE — 99291 CRITICAL CARE FIRST HOUR: CPT | Mod: ,,, | Performed by: PHYSICIAN ASSISTANT

## 2022-01-31 PROCEDURE — 88342 IMHCHEM/IMCYTCHM 1ST ANTB: CPT | Performed by: STUDENT IN AN ORGANIZED HEALTH CARE EDUCATION/TRAINING PROGRAM

## 2022-01-31 PROCEDURE — 69990 MICROSURGERY ADD-ON: CPT | Mod: 59,,, | Performed by: NEUROLOGICAL SURGERY

## 2022-01-31 PROCEDURE — 86901 BLOOD TYPING SEROLOGIC RH(D): CPT | Performed by: STUDENT IN AN ORGANIZED HEALTH CARE EDUCATION/TRAINING PROGRAM

## 2022-01-31 PROCEDURE — 88341 IMHCHEM/IMCYTCHM EA ADD ANTB: CPT | Mod: 26,,, | Performed by: STUDENT IN AN ORGANIZED HEALTH CARE EDUCATION/TRAINING PROGRAM

## 2022-01-31 PROCEDURE — 69990 PR MICROSURG TECHNIQUES,REQ OPER MICROSCOPE: ICD-10-PCS | Mod: 59,,, | Performed by: NEUROLOGICAL SURGERY

## 2022-01-31 PROCEDURE — 88341 IMHCHEM/IMCYTCHM EA ADD ANTB: CPT | Mod: 59 | Performed by: STUDENT IN AN ORGANIZED HEALTH CARE EDUCATION/TRAINING PROGRAM

## 2022-01-31 PROCEDURE — D9220A PRA ANESTHESIA: ICD-10-PCS | Mod: ,,, | Performed by: ANESTHESIOLOGY

## 2022-01-31 PROCEDURE — 27201037 HC PRESSURE MONITORING SET UP

## 2022-01-31 PROCEDURE — 36000712 HC OR TIME LEV V 1ST 15 MIN: Performed by: NEUROLOGICAL SURGERY

## 2022-01-31 PROCEDURE — 94761 N-INVAS EAR/PLS OXIMETRY MLT: CPT

## 2022-01-31 PROCEDURE — 61537 PR CRANIOT W BONE FLAP TEMPORAL LOBE W/O ECOG: ICD-10-PCS | Mod: 22,62,, | Performed by: NEUROLOGICAL SURGERY

## 2022-01-31 PROCEDURE — 99291 PR CRITICAL CARE, E/M 30-74 MINUTES: ICD-10-PCS | Mod: ,,, | Performed by: PHYSICIAN ASSISTANT

## 2022-01-31 PROCEDURE — 63600175 PHARM REV CODE 636 W HCPCS: Performed by: PHYSICIAN ASSISTANT

## 2022-01-31 PROCEDURE — D9220A PRA ANESTHESIA: Mod: ,,, | Performed by: ANESTHESIOLOGY

## 2022-01-31 PROCEDURE — 88305 TISSUE EXAM BY PATHOLOGIST: CPT | Performed by: STUDENT IN AN ORGANIZED HEALTH CARE EDUCATION/TRAINING PROGRAM

## 2022-01-31 PROCEDURE — 37000008 HC ANESTHESIA 1ST 15 MINUTES: Performed by: NEUROLOGICAL SURGERY

## 2022-01-31 PROCEDURE — 88342 CHG IMMUNOCYTOCHEMISTRY: ICD-10-PCS | Mod: 26,,, | Performed by: STUDENT IN AN ORGANIZED HEALTH CARE EDUCATION/TRAINING PROGRAM

## 2022-01-31 DEVICE — COVER UN3 BURRHOLE 14MM TAB: Type: IMPLANTABLE DEVICE | Site: CRANIAL | Status: FUNCTIONAL

## 2022-01-31 DEVICE — SCREW UN3 AXS SD 1.5X4MM: Type: IMPLANTABLE DEVICE | Site: CRANIAL | Status: FUNCTIONAL

## 2022-01-31 DEVICE — PLATE CRANIAL UN3 BOX LG 2X2: Type: IMPLANTABLE DEVICE | Site: CRANIAL | Status: FUNCTIONAL

## 2022-01-31 DEVICE — SCREW UN3 AXS SD 1.5X3MM: Type: IMPLANTABLE DEVICE | Site: CRANIAL | Status: FUNCTIONAL

## 2022-01-31 DEVICE — PLATE BONE BUR HOLE COVER 10MM: Type: IMPLANTABLE DEVICE | Site: CRANIAL | Status: FUNCTIONAL

## 2022-01-31 RX ORDER — SODIUM CHLORIDE 9 MG/ML
INJECTION, SOLUTION INTRAVENOUS CONTINUOUS PRN
Status: DISCONTINUED | OUTPATIENT
Start: 2022-01-31 | End: 2022-01-31

## 2022-01-31 RX ORDER — MIDAZOLAM HYDROCHLORIDE 1 MG/ML
INJECTION INTRAMUSCULAR; INTRAVENOUS
Status: DISCONTINUED | OUTPATIENT
Start: 2022-01-31 | End: 2022-01-31

## 2022-01-31 RX ORDER — LEVETIRACETAM 500 MG/1
1000 TABLET ORAL 2 TIMES DAILY
Status: DISCONTINUED | OUTPATIENT
Start: 2022-01-31 | End: 2022-01-31

## 2022-01-31 RX ORDER — GLUCAGON 1 MG
1 KIT INJECTION
Status: DISCONTINUED | OUTPATIENT
Start: 2022-01-31 | End: 2022-02-08 | Stop reason: HOSPADM

## 2022-01-31 RX ORDER — DEXMEDETOMIDINE HYDROCHLORIDE 100 UG/ML
INJECTION, SOLUTION INTRAVENOUS
Status: DISCONTINUED | OUTPATIENT
Start: 2022-01-31 | End: 2022-01-31

## 2022-01-31 RX ORDER — ONDANSETRON 2 MG/ML
4 INJECTION INTRAMUSCULAR; INTRAVENOUS EVERY 12 HOURS PRN
Status: DISCONTINUED | OUTPATIENT
Start: 2022-01-31 | End: 2022-02-08 | Stop reason: HOSPADM

## 2022-01-31 RX ORDER — IBUPROFEN 200 MG
24 TABLET ORAL
Status: DISCONTINUED | OUTPATIENT
Start: 2022-01-31 | End: 2022-02-08 | Stop reason: HOSPADM

## 2022-01-31 RX ORDER — BACITRACIN ZINC 500 UNIT/G
OINTMENT (GRAM) TOPICAL
Status: DISCONTINUED | OUTPATIENT
Start: 2022-01-31 | End: 2022-01-31 | Stop reason: HOSPADM

## 2022-01-31 RX ORDER — PHENYLEPHRINE HYDROCHLORIDE 10 MG/ML
INJECTION INTRAVENOUS
Status: DISCONTINUED | OUTPATIENT
Start: 2022-01-31 | End: 2022-01-31

## 2022-01-31 RX ORDER — MANNITOL 250 MG/ML
INJECTION, SOLUTION INTRAVENOUS
Status: DISCONTINUED | OUTPATIENT
Start: 2022-01-31 | End: 2022-01-31

## 2022-01-31 RX ORDER — ROCURONIUM BROMIDE 10 MG/ML
INJECTION, SOLUTION INTRAVENOUS
Status: DISCONTINUED | OUTPATIENT
Start: 2022-01-31 | End: 2022-01-31

## 2022-01-31 RX ORDER — MUPIROCIN 20 MG/G
OINTMENT TOPICAL 2 TIMES DAILY
Status: DISCONTINUED | OUTPATIENT
Start: 2022-01-31 | End: 2022-02-05

## 2022-01-31 RX ORDER — NICARDIPINE HYDROCHLORIDE 0.2 MG/ML
0-15 INJECTION INTRAVENOUS CONTINUOUS
Status: DISCONTINUED | OUTPATIENT
Start: 2022-01-31 | End: 2022-02-01

## 2022-01-31 RX ORDER — LEVETIRACETAM 500 MG/5ML
INJECTION, SOLUTION, CONCENTRATE INTRAVENOUS
Status: DISCONTINUED | OUTPATIENT
Start: 2022-01-31 | End: 2022-01-31

## 2022-01-31 RX ORDER — DEXAMETHASONE SODIUM PHOSPHATE 4 MG/ML
4 INJECTION, SOLUTION INTRA-ARTICULAR; INTRALESIONAL; INTRAMUSCULAR; INTRAVENOUS; SOFT TISSUE EVERY 6 HOURS
Status: DISCONTINUED | OUTPATIENT
Start: 2022-02-01 | End: 2022-02-08 | Stop reason: HOSPADM

## 2022-01-31 RX ORDER — IBUPROFEN 200 MG
16 TABLET ORAL
Status: DISCONTINUED | OUTPATIENT
Start: 2022-01-31 | End: 2022-02-08 | Stop reason: HOSPADM

## 2022-01-31 RX ORDER — TOPIRAMATE 200 MG/1
200 TABLET ORAL 2 TIMES DAILY
Status: DISCONTINUED | OUTPATIENT
Start: 2022-01-31 | End: 2022-02-08 | Stop reason: HOSPADM

## 2022-01-31 RX ORDER — HYDRALAZINE HYDROCHLORIDE 20 MG/ML
20 INJECTION INTRAMUSCULAR; INTRAVENOUS EVERY 6 HOURS PRN
Status: DISCONTINUED | OUTPATIENT
Start: 2022-01-31 | End: 2022-02-01

## 2022-01-31 RX ORDER — ONDANSETRON 8 MG/1
8 TABLET, ORALLY DISINTEGRATING ORAL EVERY 8 HOURS PRN
Status: DISCONTINUED | OUTPATIENT
Start: 2022-01-31 | End: 2022-02-08 | Stop reason: HOSPADM

## 2022-01-31 RX ORDER — FENTANYL CITRATE 50 UG/ML
INJECTION, SOLUTION INTRAMUSCULAR; INTRAVENOUS
Status: DISCONTINUED | OUTPATIENT
Start: 2022-01-31 | End: 2022-01-31

## 2022-01-31 RX ORDER — NEOSTIGMINE METHYLSULFATE 0.5 MG/ML
INJECTION, SOLUTION INTRAVENOUS
Status: DISCONTINUED | OUTPATIENT
Start: 2022-01-31 | End: 2022-01-31

## 2022-01-31 RX ORDER — HYDROCODONE BITARTRATE AND ACETAMINOPHEN 5; 325 MG/1; MG/1
1 TABLET ORAL EVERY 4 HOURS PRN
Status: DISCONTINUED | OUTPATIENT
Start: 2022-01-31 | End: 2022-02-08 | Stop reason: HOSPADM

## 2022-01-31 RX ORDER — FAMOTIDINE 20 MG/1
20 TABLET, FILM COATED ORAL 2 TIMES DAILY
Status: DISCONTINUED | OUTPATIENT
Start: 2022-01-31 | End: 2022-02-08 | Stop reason: HOSPADM

## 2022-01-31 RX ORDER — PROPOFOL 10 MG/ML
VIAL (ML) INTRAVENOUS
Status: DISCONTINUED | OUTPATIENT
Start: 2022-01-31 | End: 2022-01-31

## 2022-01-31 RX ORDER — CEFTRIAXONE 1 G/1
INJECTION, POWDER, FOR SOLUTION INTRAMUSCULAR; INTRAVENOUS
Status: DISCONTINUED | OUTPATIENT
Start: 2022-01-31 | End: 2022-01-31 | Stop reason: HOSPADM

## 2022-01-31 RX ORDER — LEVETIRACETAM 500 MG/5ML
1000 INJECTION, SOLUTION, CONCENTRATE INTRAVENOUS EVERY 12 HOURS
Status: DISCONTINUED | OUTPATIENT
Start: 2022-01-31 | End: 2022-02-03

## 2022-01-31 RX ORDER — LABETALOL HYDROCHLORIDE 5 MG/ML
10 INJECTION, SOLUTION INTRAVENOUS EVERY 10 MIN PRN
Status: DISCONTINUED | OUTPATIENT
Start: 2022-01-31 | End: 2022-02-01

## 2022-01-31 RX ORDER — LIDOCAINE HYDROCHLORIDE AND EPINEPHRINE 10; 10 MG/ML; UG/ML
INJECTION, SOLUTION INFILTRATION; PERINEURAL
Status: DISCONTINUED | OUTPATIENT
Start: 2022-01-31 | End: 2022-01-31 | Stop reason: HOSPADM

## 2022-01-31 RX ORDER — MUPIROCIN 20 MG/G
OINTMENT TOPICAL
Status: DISCONTINUED | OUTPATIENT
Start: 2022-01-31 | End: 2022-01-31 | Stop reason: HOSPADM

## 2022-01-31 RX ORDER — DEXAMETHASONE SODIUM PHOSPHATE 4 MG/ML
INJECTION, SOLUTION INTRA-ARTICULAR; INTRALESIONAL; INTRAMUSCULAR; INTRAVENOUS; SOFT TISSUE
Status: DISCONTINUED | OUTPATIENT
Start: 2022-01-31 | End: 2022-01-31

## 2022-01-31 RX ORDER — PHENYLEPHRINE HCL IN 0.9% NACL 1 MG/10 ML
SYRINGE (ML) INTRAVENOUS
Status: DISCONTINUED | OUTPATIENT
Start: 2022-01-31 | End: 2022-01-31

## 2022-01-31 RX ORDER — ACETAMINOPHEN 10 MG/ML
INJECTION, SOLUTION INTRAVENOUS
Status: DISCONTINUED | OUTPATIENT
Start: 2022-01-31 | End: 2022-01-31

## 2022-01-31 RX ORDER — CEFAZOLIN SODIUM/D5W 2 G/100 ML
2 PLASTIC BAG, INJECTION (ML) INTRAVENOUS
Status: DISPENSED | OUTPATIENT
Start: 2022-01-31 | End: 2022-02-02

## 2022-01-31 RX ORDER — LEVETIRACETAM 500 MG/5ML
1000 INJECTION, SOLUTION, CONCENTRATE INTRAVENOUS ONCE
Status: COMPLETED | OUTPATIENT
Start: 2022-01-31 | End: 2022-02-01

## 2022-01-31 RX ORDER — ACETAMINOPHEN 325 MG/1
650 TABLET ORAL EVERY 4 HOURS PRN
Status: DISCONTINUED | OUTPATIENT
Start: 2022-01-31 | End: 2022-02-08 | Stop reason: HOSPADM

## 2022-01-31 RX ORDER — ONDANSETRON 2 MG/ML
INJECTION INTRAMUSCULAR; INTRAVENOUS
Status: DISCONTINUED | OUTPATIENT
Start: 2022-01-31 | End: 2022-01-31

## 2022-01-31 RX ORDER — LIDOCAINE HYDROCHLORIDE 20 MG/ML
INJECTION INTRAVENOUS
Status: DISCONTINUED | OUTPATIENT
Start: 2022-01-31 | End: 2022-01-31

## 2022-01-31 RX ORDER — MUPIROCIN 20 MG/G
1 OINTMENT TOPICAL 2 TIMES DAILY
Status: DISCONTINUED | OUTPATIENT
Start: 2022-01-31 | End: 2022-01-31 | Stop reason: HOSPADM

## 2022-01-31 RX ORDER — INSULIN ASPART 100 [IU]/ML
0-5 INJECTION, SOLUTION INTRAVENOUS; SUBCUTANEOUS
Status: DISCONTINUED | OUTPATIENT
Start: 2022-01-31 | End: 2022-02-08 | Stop reason: HOSPADM

## 2022-01-31 RX ADMIN — PHENYLEPHRINE HYDROCHLORIDE 50 MCG: 10 INJECTION INTRAVENOUS at 02:01

## 2022-01-31 RX ADMIN — DEXMEDETOMIDINE HYDROCHLORIDE 4 MCG: 100 INJECTION, SOLUTION INTRAVENOUS at 03:01

## 2022-01-31 RX ADMIN — DEXMEDETOMIDINE HYDROCHLORIDE 4 MCG: 100 INJECTION, SOLUTION INTRAVENOUS at 06:01

## 2022-01-31 RX ADMIN — ROCURONIUM BROMIDE 10 MG: 10 INJECTION, SOLUTION INTRAVENOUS at 04:01

## 2022-01-31 RX ADMIN — FENTANYL CITRATE 25 MCG: 50 INJECTION, SOLUTION INTRAMUSCULAR; INTRAVENOUS at 03:01

## 2022-01-31 RX ADMIN — PROPOFOL 10 MG: 10 INJECTION, EMULSION INTRAVENOUS at 03:01

## 2022-01-31 RX ADMIN — DEXAMETHASONE SODIUM PHOSPHATE 4 MG: 4 INJECTION, SOLUTION INTRAMUSCULAR; INTRAVENOUS at 02:01

## 2022-01-31 RX ADMIN — MUPIROCIN: 20 OINTMENT TOPICAL at 11:01

## 2022-01-31 RX ADMIN — ROCURONIUM BROMIDE 40 MG: 10 INJECTION, SOLUTION INTRAVENOUS at 01:01

## 2022-01-31 RX ADMIN — FENTANYL CITRATE 25 MCG: 50 INJECTION, SOLUTION INTRAMUSCULAR; INTRAVENOUS at 02:01

## 2022-01-31 RX ADMIN — LEVETIRACETAM 1000 MG: 100 INJECTION, SOLUTION, CONCENTRATE INTRAVENOUS at 02:01

## 2022-01-31 RX ADMIN — PHENYLEPHRINE HYDROCHLORIDE 100 MCG: 10 INJECTION INTRAVENOUS at 06:01

## 2022-01-31 RX ADMIN — ROCURONIUM BROMIDE 10 MG: 10 INJECTION, SOLUTION INTRAVENOUS at 01:01

## 2022-01-31 RX ADMIN — ROCURONIUM BROMIDE 20 MG: 10 INJECTION, SOLUTION INTRAVENOUS at 02:01

## 2022-01-31 RX ADMIN — PHENYLEPHRINE HYDROCHLORIDE 50 MCG: 10 INJECTION INTRAVENOUS at 06:01

## 2022-01-31 RX ADMIN — ONDANSETRON HYDROCHLORIDE 4 MG: 2 INJECTION INTRAMUSCULAR; INTRAVENOUS at 06:01

## 2022-01-31 RX ADMIN — DEXMEDETOMIDINE HYDROCHLORIDE 4 MCG: 100 INJECTION, SOLUTION INTRAVENOUS at 04:01

## 2022-01-31 RX ADMIN — ROCURONIUM BROMIDE 20 MG: 10 INJECTION, SOLUTION INTRAVENOUS at 05:01

## 2022-01-31 RX ADMIN — NEOSTIGMINE METHYLSULFATE 4 MG: 0.5 INJECTION INTRAVENOUS at 07:01

## 2022-01-31 RX ADMIN — CEFTRIAXONE SODIUM 2 G: 2 INJECTION, SOLUTION INTRAVENOUS at 01:01

## 2022-01-31 RX ADMIN — PROPOFOL 10 MG: 10 INJECTION, EMULSION INTRAVENOUS at 01:01

## 2022-01-31 RX ADMIN — PROPOFOL 40 MG: 10 INJECTION, EMULSION INTRAVENOUS at 02:01

## 2022-01-31 RX ADMIN — MIDAZOLAM HYDROCHLORIDE 2 MG: 1 INJECTION, SOLUTION INTRAMUSCULAR; INTRAVENOUS at 12:01

## 2022-01-31 RX ADMIN — GLYCOPYRROLATE 0.4 MG: 0.2 INJECTION, SOLUTION INTRAMUSCULAR; INTRAVITREAL at 07:01

## 2022-01-31 RX ADMIN — PROPOFOL 10 MG: 10 INJECTION, EMULSION INTRAVENOUS at 02:01

## 2022-01-31 RX ADMIN — ROCURONIUM BROMIDE 20 MG: 10 INJECTION, SOLUTION INTRAVENOUS at 03:01

## 2022-01-31 RX ADMIN — PROPOFOL 40 MG: 10 INJECTION, EMULSION INTRAVENOUS at 01:01

## 2022-01-31 RX ADMIN — ACETAMINOPHEN 1000 MG: 10 INJECTION, SOLUTION INTRAVENOUS at 02:01

## 2022-01-31 RX ADMIN — PHENYLEPHRINE HYDROCHLORIDE 50 MCG: 10 INJECTION INTRAVENOUS at 01:01

## 2022-01-31 RX ADMIN — FENTANYL CITRATE 50 MCG: 50 INJECTION, SOLUTION INTRAMUSCULAR; INTRAVENOUS at 02:01

## 2022-01-31 RX ADMIN — FENTANYL CITRATE 25 MCG: 50 INJECTION, SOLUTION INTRAMUSCULAR; INTRAVENOUS at 06:01

## 2022-01-31 RX ADMIN — LEVETIRACETAM 1000 MG: 500 INJECTION, SOLUTION INTRAVENOUS at 10:01

## 2022-01-31 RX ADMIN — MUPIROCIN: 20 OINTMENT TOPICAL at 09:01

## 2022-01-31 RX ADMIN — LIDOCAINE HYDROCHLORIDE 100 MG: 20 INJECTION, SOLUTION INTRAVENOUS at 01:01

## 2022-01-31 RX ADMIN — PROPOFOL 20 MG: 10 INJECTION, EMULSION INTRAVENOUS at 01:01

## 2022-01-31 RX ADMIN — PROPOFOL 120 MG: 10 INJECTION, EMULSION INTRAVENOUS at 01:01

## 2022-01-31 RX ADMIN — MANNITOL 50 G: 12.5 INJECTION, SOLUTION INTRAVENOUS at 03:01

## 2022-01-31 RX ADMIN — DEXMEDETOMIDINE HYDROCHLORIDE 4 MCG: 100 INJECTION, SOLUTION INTRAVENOUS at 05:01

## 2022-01-31 RX ADMIN — SODIUM CHLORIDE, SODIUM GLUCONATE, SODIUM ACETATE, POTASSIUM CHLORIDE, MAGNESIUM CHLORIDE, SODIUM PHOSPHATE, DIBASIC, AND POTASSIUM PHOSPHATE: .53; .5; .37; .037; .03; .012; .00082 INJECTION, SOLUTION INTRAVENOUS at 01:01

## 2022-01-31 RX ADMIN — Medication 100 MCG: at 01:01

## 2022-01-31 RX ADMIN — SODIUM CHLORIDE: 0.9 INJECTION, SOLUTION INTRAVENOUS at 12:01

## 2022-01-31 RX ADMIN — ROCURONIUM BROMIDE 10 MG: 10 INJECTION, SOLUTION INTRAVENOUS at 02:01

## 2022-01-31 RX ADMIN — SODIUM CHLORIDE, SODIUM GLUCONATE, SODIUM ACETATE, POTASSIUM CHLORIDE, MAGNESIUM CHLORIDE, SODIUM PHOSPHATE, DIBASIC, AND POTASSIUM PHOSPHATE: .53; .5; .37; .037; .03; .012; .00082 INJECTION, SOLUTION INTRAVENOUS at 02:01

## 2022-01-31 RX ADMIN — FENTANYL CITRATE 50 MCG: 50 INJECTION, SOLUTION INTRAMUSCULAR; INTRAVENOUS at 01:01

## 2022-01-31 NOTE — ANESTHESIA PROCEDURE NOTES
Arterial    Diagnosis: Refractory epilepsy    Patient location during procedure: done in OR  Procedure start time: 1/31/2022 1:08 PM  Timeout: 1/31/2022 1:07 PM  Procedure end time: 1/31/2022 1:11 PM    Staffing  Authorizing Provider: Nii Archer Jr., MD  Performing Provider: Ian Krueger MD    Anesthesiologist was present at the time of the procedure.    Preanesthetic Checklist  Completed: patient identified, IV checked, site marked, risks and benefits discussed, surgical consent, monitors and equipment checked, pre-op evaluation, timeout performed and anesthesia consent givenArterial  Skin Prep: chlorhexidine gluconate  Orientation: left  Location: radial    Catheter Size: 20 G  Catheter placement by Ultrasound guidance. Heme positive aspiration all ports.  Vessel Caliber: small, medium, patent, compressibility normal  Needle advanced into vessel with real time Ultrasound guidance.  Guidewire confirmed in vessel.Insertion Attempts: 1  Assessment  Dressing: secured with tape and tegaderm  Patient: Tolerated well

## 2022-01-31 NOTE — ANESTHESIA PREPROCEDURE EVALUATION
01/31/2022  Carolyn Mccullough is a 31 y.o., female.      Pre-operative evaluation for Procedure(s) (LRB):  TEMPORAL LOBECTOMY WITH BRAINLAB LEFT (Left)    Encounter Diagnosis   Name Primary?    Epilepsy        Review of patient's allergies indicates:   Allergen Reactions    Carbamazepine Rash    Iodinated contrast media Dermatitis    Escitalopram Rash       Medications Prior to Admission   Medication Sig Dispense Refill Last Dose    acetaminophen (TYLENOL) 500 MG tablet Take 500 mg by mouth daily as needed for Pain.       aspirin/salicylamide/caffeine (BC HEADACHE POWDER ORAL) Take by mouth as needed. Headache       ibuprofen (ADVIL,MOTRIN) 800 MG tablet Take 800 mg by mouth 3 (three) times daily as needed.       levETIRAcetam (KEPPRA) 1000 MG tablet Take 1,000 mg by mouth 2 (two) times daily.       midazolam (NAYZILAM) 5 mg/spray (0.1 mL) Spry 1 spray by Nasal route every 10 (ten) minutes as needed (cluster seizures). Please administer 1 spray after seizures lasting more than 3 minutes or more that 3 seizures in a 24 hour period, may administer a second dose after 10 min if there is no response to the first dose. Use for treatment of no more than 1 episode (1-2 sprays) every three days, no more than 5 episodes (1-2 sprays) in a month 4 each 3     topiramate (TOPAMAX) 200 MG Tab Take 200 mg by mouth 2 (two) times daily.           No current facility-administered medications on file prior to encounter.     Current Outpatient Medications on File Prior to Encounter   Medication Sig Dispense Refill    ibuprofen (ADVIL,MOTRIN) 800 MG tablet Take 800 mg by mouth 3 (three) times daily as needed.      levETIRAcetam (KEPPRA) 1000 MG tablet Take 1,000 mg by mouth 2 (two) times daily.      midazolam (NAYZILAM) 5 mg/spray (0.1 mL) Spry 1 spray by Nasal route every 10 (ten) minutes as needed (cluster  seizures). Please administer 1 spray after seizures lasting more than 3 minutes or more that 3 seizures in a 24 hour period, may administer a second dose after 10 min if there is no response to the first dose. Use for treatment of no more than 1 episode (1-2 sprays) every three days, no more than 5 episodes (1-2 sprays) in a month 4 each 3    topiramate (TOPAMAX) 200 MG Tab Take 200 mg by mouth 2 (two) times daily.         Past Medical History:  No date: Seizures    Past Surgical History:   Procedure Laterality Date    [facility]     SECTION  2010    TUBAL LIGATION         Social History     Tobacco Use   Smoking Status Never Smoker   Smokeless Tobacco Never Used       Social History     Substance and Sexual Activity   Alcohol Use No    Comment: one drink in a few months time        Physical Activity: Not on file         No results for input(s): HCT in the last 72 hours.  No results for input(s): PLT in the last 72 hours.  No results for input(s): K in the last 72 hours.  No results for input(s): CREATININE in the last 72 hours.  No results for input(s): GLU in the last 72 hours.  No results for input(s): PT in the last 72 hours.                    Anesthesia Evaluation          Review of Systems  Anesthesia Hx:  No problems with previous Anesthesia   Hematology/Oncology:  Hematology Normal   Oncology Normal     Cardiovascular:  Cardiovascular Normal  Denies Hypertension.  Denies MI.    Denies Angina.    Pulmonary:  Pulmonary Normal  Denies COPD.  Denies Asthma.  Denies Shortness of breath.    Renal/:   Denies Chronic Renal Disease.     Hepatic/GI:   Denies Liver Disease.    Neurological:   Denies TIA. Denies CVA. Seizures ([place]), poorly controlled    Endocrine:   Denies Diabetes.        Physical Exam  General:  Well nourished    Airway/Jaw/Neck:  Airway Findings: Mouth Opening: Normal Tongue: Normal  General Airway Assessment: Adult, Average   Mallampati: II  TM Distance: Normal, at least 6 cm  Jaw/Neck Findings:  Neck ROM: Normal ROM            Mental Status:  Mental Status Findings:  Cooperative, Alert and Oriented         Anesthesia Plan  Type of Anesthesia, risks & benefits discussed:  Anesthesia Type:  general    Patient's Preference: General  Plan Factors:          Intra-op Monitoring Plan: standard ASA monitors and arterial line  Intra-op Monitoring Plan Comments:   Post Op Pain Control Plan: multimodal analgesia, IV/PO Opioids PRN and per primary service following discharge from PACU  Post Op Pain Control Plan Comments: As per surgeon's plan    Induction:   Inhalation  Beta Blocker:  Patient is not currently on a Beta-Blocker (No further documentation required).       Informed Consent: Patient representative understands risks and agrees with Anesthesia plan.  Questions answered. Anesthesia consent signed with patient representative.  ASA Score: 3     Day of Surgery Review of History & Physical:    H&P update referred to the surgeon.     Anesthesia Plan Notes: Discussed plan for general endotracheal anesthesia, pt understands and agrees with plan        Ready For Surgery From Anesthesia Perspective.     ETT 10/29/20; 1053 (created via procedure documentation); Direct laryngoscopy; Oral Standard; 7.5 mm; Cuffed; Auscultation, Capnometry, Symmetrical chest wall movement; Pink tape; Ram; 1 10/29/20 1053 by Paul Choudhury MD 10/29/20 1453 by Radames Aldridge CRNA         11/9/2020  CONCLUSIONS   -----------   1. Normal left ventricular systolic and diastolic functions with LVEF >55%.   2. Normal right ventricular systolic function.   3. Normal LV filling pressure at rest.   4. Normal calculated LV strain (-20%).     /

## 2022-01-31 NOTE — PLAN OF CARE
1115-Dr Archer at bedside with ultrasound machine to start IV. 22 gauge started in right wrist. Unable to get enough blood for all the lab test ordered. T&S and ABO sent to lab.

## 2022-01-31 NOTE — ANESTHESIA PROCEDURE NOTES
Intubation    Date/Time: 1/31/2022 1:04 PM  Performed by: Ian Krueger MD  Authorized by: Nii Archer Jr., MD     Intubation:     Induction:  Intravenous    Intubated:  Postinduction    Mask Ventilation:  Easy mask    Attempts:  1    Attempted By:  Resident anesthesiologist    Method of Intubation:  Video laryngoscopy    Blade:  Taveras 3    Laryngeal View Grade: Grade I - full view of cords      Difficult Airway Encountered?: No      Complications:  None    Airway Device:  Oral endotracheal tube    Airway Device Size:  7.0    Style/Cuff Inflation:  Cuffed (inflated to minimal occlusive pressure)    Inflation Amount (mL):  8    Tube secured:  21    Secured at:  The lips    Placement Verified By:  Capnometry    Complicating Factors:  Obesity, short neck and oropharyngeal edema or fat    Findings Post-Intubation:  BS equal bilateral and atraumatic/condition of teeth unchanged

## 2022-02-01 PROBLEM — I63.312 THROMBOTIC STROKE INVOLVING LEFT MIDDLE CEREBRAL ARTERY: Status: ACTIVE | Noted: 2022-02-01

## 2022-02-01 PROBLEM — I63.9 ACUTE STROKE OF BASAL GANGLIA: Status: ACTIVE | Noted: 2022-02-01

## 2022-02-01 LAB
ALBUMIN SERPL BCP-MCNC: 3.1 G/DL (ref 3.5–5.2)
ALLENS TEST: ABNORMAL
ALP SERPL-CCNC: 63 U/L (ref 55–135)
ALT SERPL W/O P-5'-P-CCNC: 9 U/L (ref 10–44)
ANION GAP SERPL CALC-SCNC: 6 MMOL/L (ref 8–16)
ASCENDING AORTA: 2.81 CM
AST SERPL-CCNC: 23 U/L (ref 10–40)
AV INDEX (PROSTH): 0.69
AV MEAN GRADIENT: 3 MMHG
AV PEAK GRADIENT: 5 MMHG
AV VALVE AREA: 2.14 CM2
AV VELOCITY RATIO: 0.6
BASOPHILS # BLD AUTO: 0.01 K/UL (ref 0–0.2)
BASOPHILS NFR BLD: 0.1 % (ref 0–1.9)
BILIRUB SERPL-MCNC: 0.1 MG/DL (ref 0.1–1)
BSA FOR ECHO PROCEDURE: 2.33 M2
BUN SERPL-MCNC: 7 MG/DL (ref 6–20)
CALCIUM SERPL-MCNC: 7.9 MG/DL (ref 8.7–10.5)
CHLORIDE SERPL-SCNC: 117 MMOL/L (ref 95–110)
CO2 SERPL-SCNC: 15 MMOL/L (ref 23–29)
CREAT SERPL-MCNC: 0.7 MG/DL (ref 0.5–1.4)
CV ECHO LV RWT: 0.39 CM
DELSYS: ABNORMAL
DIFFERENTIAL METHOD: ABNORMAL
DOP CALC AO PEAK VEL: 1.16 M/S
DOP CALC AO VTI: 21.16 CM
DOP CALC LVOT AREA: 3.1 CM2
DOP CALC LVOT DIAMETER: 1.99 CM
DOP CALC LVOT PEAK VEL: 0.7 M/S
DOP CALC LVOT STROKE VOLUME: 45.26 CM3
DOP CALCLVOT PEAK VEL VTI: 14.56 CM
E WAVE DECELERATION TIME: 235.28 MSEC
E/A RATIO: 1.04
E/E' RATIO: 7.24 M/S
ECHO LV POSTERIOR WALL: 0.87 CM (ref 0.6–1.1)
EJECTION FRACTION: 60 %
EOSINOPHIL # BLD AUTO: 0 K/UL (ref 0–0.5)
EOSINOPHIL NFR BLD: 0 % (ref 0–8)
ERYTHROCYTE [DISTWIDTH] IN BLOOD BY AUTOMATED COUNT: 15 % (ref 11.5–14.5)
EST. GFR  (AFRICAN AMERICAN): >60 ML/MIN/1.73 M^2
EST. GFR  (NON AFRICAN AMERICAN): >60 ML/MIN/1.73 M^2
FRACTIONAL SHORTENING: 43 % (ref 28–44)
GLUCOSE SERPL-MCNC: 123 MG/DL (ref 70–110)
HCO3 UR-SCNC: 15.2 MMOL/L (ref 24–28)
HCO3 UR-SCNC: 16.7 MMOL/L (ref 24–28)
HCO3 UR-SCNC: 17.6 MMOL/L (ref 24–28)
HCT VFR BLD AUTO: 36.5 % (ref 37–48.5)
HGB BLD-MCNC: 11.7 G/DL (ref 12–16)
IMM GRANULOCYTES # BLD AUTO: 0.05 K/UL (ref 0–0.04)
IMM GRANULOCYTES NFR BLD AUTO: 0.4 % (ref 0–0.5)
INTERVENTRICULAR SEPTUM: 0.66 CM (ref 0.6–1.1)
LA MAJOR: 4.96 CM
LA MINOR: 3.68 CM
LA WIDTH: 3.65 CM
LEFT ATRIUM SIZE: 2.88 CM
LEFT ATRIUM VOLUME INDEX: 17 ML/M2
LEFT ATRIUM VOLUME: 37.75 CM3
LEFT INTERNAL DIMENSION IN SYSTOLE: 2.5 CM (ref 2.1–4)
LEFT VENTRICLE DIASTOLIC VOLUME INDEX: 39.75 ML/M2
LEFT VENTRICLE DIASTOLIC VOLUME: 88.24 ML
LEFT VENTRICLE MASS INDEX: 47 G/M2
LEFT VENTRICLE SYSTOLIC VOLUME INDEX: 10.1 ML/M2
LEFT VENTRICLE SYSTOLIC VOLUME: 22.4 ML
LEFT VENTRICULAR INTERNAL DIMENSION IN DIASTOLE: 4.41 CM (ref 3.5–6)
LEFT VENTRICULAR MASS: 103.62 G
LV LATERAL E/E' RATIO: 6.91 M/S
LV SEPTAL E/E' RATIO: 7.6 M/S
LYMPHOCYTES # BLD AUTO: 0.6 K/UL (ref 1–4.8)
LYMPHOCYTES NFR BLD: 4.5 % (ref 18–48)
MAGNESIUM SERPL-MCNC: 2.1 MG/DL (ref 1.6–2.6)
MCH RBC QN AUTO: 27.4 PG (ref 27–31)
MCHC RBC AUTO-ENTMCNC: 32.1 G/DL (ref 32–36)
MCV RBC AUTO: 86 FL (ref 82–98)
MODE: ABNORMAL
MONOCYTES # BLD AUTO: 0.3 K/UL (ref 0.3–1)
MONOCYTES NFR BLD: 2.2 % (ref 4–15)
MV PEAK A VEL: 0.73 M/S
MV PEAK E VEL: 0.76 M/S
MV STENOSIS PRESSURE HALF TIME: 68.23 MS
MV VALVE AREA P 1/2 METHOD: 3.22 CM2
NEUTROPHILS # BLD AUTO: 12.1 K/UL (ref 1.8–7.7)
NEUTROPHILS NFR BLD: 92.8 % (ref 38–73)
NRBC BLD-RTO: 0 /100 WBC
PCO2 BLDA: 28.6 MMHG (ref 35–45)
PCO2 BLDA: 29.4 MMHG (ref 35–45)
PCO2 BLDA: 30.3 MMHG (ref 35–45)
PH SMN: 7.33 [PH] (ref 7.35–7.45)
PH SMN: 7.36 [PH] (ref 7.35–7.45)
PH SMN: 7.37 [PH] (ref 7.35–7.45)
PHOSPHATE SERPL-MCNC: 1.8 MG/DL (ref 2.7–4.5)
PISA TR MAX VEL: 2.56 M/S
PLATELET # BLD AUTO: 242 K/UL (ref 150–450)
PMV BLD AUTO: 10.4 FL (ref 9.2–12.9)
PO2 BLDA: 101 MMHG (ref 80–100)
PO2 BLDA: 112 MMHG (ref 80–100)
PO2 BLDA: 92 MMHG (ref 80–100)
POC BE: -11 MMOL/L
POC BE: -8 MMOL/L
POC BE: -9 MMOL/L
POC SATURATED O2: 97 % (ref 95–100)
POC SATURATED O2: 98 % (ref 95–100)
POC SATURATED O2: 98 % (ref 95–100)
POC TCO2: 16 MMOL/L (ref 23–27)
POC TCO2: 18 MMOL/L (ref 23–27)
POC TCO2: 18 MMOL/L (ref 23–27)
POCT GLUCOSE: 90 MG/DL (ref 70–110)
POTASSIUM SERPL-SCNC: 3.8 MMOL/L (ref 3.5–5.1)
PROT SERPL-MCNC: 6.8 G/DL (ref 6–8.4)
RA MAJOR: 3.89 CM
RA PRESSURE: 3 MMHG
RA WIDTH: 2.79 CM
RBC # BLD AUTO: 4.27 M/UL (ref 4–5.4)
RIGHT VENTRICULAR END-DIASTOLIC DIMENSION: 2.92 CM
SAMPLE: ABNORMAL
SINUS: 2.76 CM
SITE: ABNORMAL
SODIUM SERPL-SCNC: 138 MMOL/L (ref 136–145)
STJ: 2.84 CM
TDI LATERAL: 0.11 M/S
TDI SEPTAL: 0.1 M/S
TDI: 0.11 M/S
TR MAX PG: 26 MMHG
TRICUSPID ANNULAR PLANE SYSTOLIC EXCURSION: 1.99 CM
TV REST PULMONARY ARTERY PRESSURE: 29 MMHG
WBC # BLD AUTO: 13.02 K/UL (ref 3.9–12.7)

## 2022-02-01 PROCEDURE — 36620 INSERTION CATHETER ARTERY: CPT

## 2022-02-01 PROCEDURE — 84100 ASSAY OF PHOSPHORUS: CPT | Performed by: NEUROLOGICAL SURGERY

## 2022-02-01 PROCEDURE — 63600175 PHARM REV CODE 636 W HCPCS

## 2022-02-01 PROCEDURE — 99233 SBSQ HOSP IP/OBS HIGH 50: CPT | Mod: ,,,

## 2022-02-01 PROCEDURE — 63600175 PHARM REV CODE 636 W HCPCS: Performed by: PHYSICIAN ASSISTANT

## 2022-02-01 PROCEDURE — 37799 UNLISTED PX VASCULAR SURGERY: CPT

## 2022-02-01 PROCEDURE — 82803 BLOOD GASES ANY COMBINATION: CPT

## 2022-02-01 PROCEDURE — 97535 SELF CARE MNGMENT TRAINING: CPT

## 2022-02-01 PROCEDURE — 63600175 PHARM REV CODE 636 W HCPCS: Performed by: STUDENT IN AN ORGANIZED HEALTH CARE EDUCATION/TRAINING PROGRAM

## 2022-02-01 PROCEDURE — 20000000 HC ICU ROOM

## 2022-02-01 PROCEDURE — 99900035 HC TECH TIME PER 15 MIN (STAT)

## 2022-02-01 PROCEDURE — 99223 1ST HOSP IP/OBS HIGH 75: CPT | Mod: ,,, | Performed by: PSYCHIATRY & NEUROLOGY

## 2022-02-01 PROCEDURE — 25000003 PHARM REV CODE 250: Performed by: PHYSICIAN ASSISTANT

## 2022-02-01 PROCEDURE — 93010 ELECTROCARDIOGRAM REPORT: CPT | Mod: ,,, | Performed by: INTERNAL MEDICINE

## 2022-02-01 PROCEDURE — 83735 ASSAY OF MAGNESIUM: CPT | Performed by: NEUROLOGICAL SURGERY

## 2022-02-01 PROCEDURE — 27200188 HC TRANSDUCER, ART ADULT/PEDS

## 2022-02-01 PROCEDURE — 85025 COMPLETE CBC W/AUTO DIFF WBC: CPT | Performed by: PHYSICIAN ASSISTANT

## 2022-02-01 PROCEDURE — 25500020 PHARM REV CODE 255: Performed by: PSYCHIATRY & NEUROLOGY

## 2022-02-01 PROCEDURE — 92610 EVALUATE SWALLOWING FUNCTION: CPT

## 2022-02-01 PROCEDURE — 93005 ELECTROCARDIOGRAM TRACING: CPT

## 2022-02-01 PROCEDURE — C1751 CATH, INF, PER/CENT/MIDLINE: HCPCS

## 2022-02-01 PROCEDURE — 25000003 PHARM REV CODE 250: Performed by: STUDENT IN AN ORGANIZED HEALTH CARE EDUCATION/TRAINING PROGRAM

## 2022-02-01 PROCEDURE — 99223 PR INITIAL HOSPITAL CARE,LEVL III: ICD-10-PCS | Mod: ,,, | Performed by: PSYCHIATRY & NEUROLOGY

## 2022-02-01 PROCEDURE — C9113 INJ PANTOPRAZOLE SODIUM, VIA: HCPCS | Performed by: PSYCHIATRY & NEUROLOGY

## 2022-02-01 PROCEDURE — 63600175 PHARM REV CODE 636 W HCPCS: Performed by: PSYCHIATRY & NEUROLOGY

## 2022-02-01 PROCEDURE — 93010 EKG 12-LEAD: ICD-10-PCS | Mod: ,,, | Performed by: INTERNAL MEDICINE

## 2022-02-01 PROCEDURE — 51798 US URINE CAPACITY MEASURE: CPT

## 2022-02-01 PROCEDURE — 80053 COMPREHEN METABOLIC PANEL: CPT | Performed by: PHYSICIAN ASSISTANT

## 2022-02-01 PROCEDURE — 99233 PR SUBSEQUENT HOSPITAL CARE,LEVL III: ICD-10-PCS | Mod: ,,,

## 2022-02-01 RX ORDER — HYDRALAZINE HYDROCHLORIDE 20 MG/ML
20 INJECTION INTRAMUSCULAR; INTRAVENOUS EVERY 6 HOURS PRN
Status: DISCONTINUED | OUTPATIENT
Start: 2022-02-01 | End: 2022-02-03

## 2022-02-01 RX ORDER — POTASSIUM CHLORIDE 7.45 MG/ML
40 INJECTION INTRAVENOUS
Status: DISCONTINUED | OUTPATIENT
Start: 2022-02-01 | End: 2022-02-03

## 2022-02-01 RX ORDER — SODIUM,POTASSIUM PHOSPHATES 280-250MG
2 POWDER IN PACKET (EA) ORAL
Status: DISCONTINUED | OUTPATIENT
Start: 2022-02-01 | End: 2022-02-03

## 2022-02-01 RX ORDER — LABETALOL HCL 20 MG/4 ML
10 SYRINGE (ML) INTRAVENOUS EVERY 4 HOURS PRN
Status: DISCONTINUED | OUTPATIENT
Start: 2022-02-01 | End: 2022-02-03

## 2022-02-01 RX ORDER — METHYLPREDNISOLONE SOD SUCC 125 MG
VIAL (EA) INJECTION
Status: DISCONTINUED
Start: 2022-02-01 | End: 2022-02-01 | Stop reason: WASHOUT

## 2022-02-01 RX ORDER — DIPHENHYDRAMINE HYDROCHLORIDE 50 MG/ML
25 INJECTION INTRAMUSCULAR; INTRAVENOUS ONCE
Status: COMPLETED | OUTPATIENT
Start: 2022-02-01 | End: 2022-02-01

## 2022-02-01 RX ORDER — PANTOPRAZOLE SODIUM 40 MG/10ML
40 INJECTION, POWDER, LYOPHILIZED, FOR SOLUTION INTRAVENOUS ONCE
Status: COMPLETED | OUTPATIENT
Start: 2022-02-01 | End: 2022-02-01

## 2022-02-01 RX ORDER — POTASSIUM CHLORIDE 7.45 MG/ML
80 INJECTION INTRAVENOUS
Status: DISCONTINUED | OUTPATIENT
Start: 2022-02-01 | End: 2022-02-03

## 2022-02-01 RX ORDER — POTASSIUM CHLORIDE 7.45 MG/ML
60 INJECTION INTRAVENOUS
Status: DISCONTINUED | OUTPATIENT
Start: 2022-02-01 | End: 2022-02-03

## 2022-02-01 RX ORDER — INDOMETHACIN 25 MG/1
100 CAPSULE ORAL ONCE
Status: COMPLETED | OUTPATIENT
Start: 2022-02-01 | End: 2022-02-01

## 2022-02-01 RX ORDER — DIPHENHYDRAMINE HYDROCHLORIDE 50 MG/ML
INJECTION INTRAMUSCULAR; INTRAVENOUS
Status: COMPLETED
Start: 2022-02-01 | End: 2022-02-01

## 2022-02-01 RX ORDER — HYDRALAZINE HYDROCHLORIDE 20 MG/ML
20 INJECTION INTRAMUSCULAR; INTRAVENOUS EVERY 6 HOURS PRN
Status: DISCONTINUED | OUTPATIENT
Start: 2022-02-01 | End: 2022-02-01

## 2022-02-01 RX ORDER — SODIUM CHLORIDE 9 MG/ML
INJECTION, SOLUTION INTRAVENOUS CONTINUOUS
Status: DISCONTINUED | OUTPATIENT
Start: 2022-02-01 | End: 2022-02-03

## 2022-02-01 RX ORDER — MAGNESIUM SULFATE HEPTAHYDRATE 40 MG/ML
4 INJECTION, SOLUTION INTRAVENOUS
Status: DISCONTINUED | OUTPATIENT
Start: 2022-02-01 | End: 2022-02-03

## 2022-02-01 RX ORDER — MAGNESIUM SULFATE HEPTAHYDRATE 40 MG/ML
2 INJECTION, SOLUTION INTRAVENOUS
Status: DISCONTINUED | OUTPATIENT
Start: 2022-02-01 | End: 2022-02-03

## 2022-02-01 RX ORDER — LABETALOL HYDROCHLORIDE 5 MG/ML
10 INJECTION, SOLUTION INTRAVENOUS EVERY 4 HOURS PRN
Status: DISCONTINUED | OUTPATIENT
Start: 2022-02-01 | End: 2022-02-01

## 2022-02-01 RX ADMIN — TOPIRAMATE 200 MG: 200 TABLET, FILM COATED ORAL at 09:02

## 2022-02-01 RX ADMIN — SODIUM CHLORIDE: 0.9 INJECTION, SOLUTION INTRAVENOUS at 03:02

## 2022-02-01 RX ADMIN — Medication 2 G: at 12:02

## 2022-02-01 RX ADMIN — LEVETIRACETAM 1000 MG: 500 INJECTION, SOLUTION INTRAVENOUS at 09:02

## 2022-02-01 RX ADMIN — TOPIRAMATE 200 MG: 200 TABLET, FILM COATED ORAL at 04:02

## 2022-02-01 RX ADMIN — DEXAMETHASONE SODIUM PHOSPHATE 4 MG: 4 INJECTION INTRA-ARTICULAR; INTRALESIONAL; INTRAMUSCULAR; INTRAVENOUS; SOFT TISSUE at 12:02

## 2022-02-01 RX ADMIN — DEXAMETHASONE SODIUM PHOSPHATE 4 MG: 4 INJECTION INTRA-ARTICULAR; INTRALESIONAL; INTRAMUSCULAR; INTRAVENOUS; SOFT TISSUE at 05:02

## 2022-02-01 RX ADMIN — MUPIROCIN: 20 OINTMENT TOPICAL at 09:02

## 2022-02-01 RX ADMIN — HUMAN ALBUMIN MICROSPHERES AND PERFLUTREN 0.66 MG: 10; .22 INJECTION, SOLUTION INTRAVENOUS at 03:02

## 2022-02-01 RX ADMIN — SODIUM BICARBONATE 100 MEQ: 84 INJECTION, SOLUTION INTRAVENOUS at 03:02

## 2022-02-01 RX ADMIN — DIPHENHYDRAMINE HYDROCHLORIDE 25 MG: 50 INJECTION INTRAMUSCULAR; INTRAVENOUS at 05:02

## 2022-02-01 RX ADMIN — HYDROCORTISONE SODIUM SUCCINATE 50 MG: 100 INJECTION, POWDER, FOR SOLUTION INTRAMUSCULAR; INTRAVENOUS at 05:02

## 2022-02-01 RX ADMIN — LEVETIRACETAM 1000 MG: 500 INJECTION, SOLUTION INTRAVENOUS at 12:02

## 2022-02-01 RX ADMIN — Medication 2 G: at 10:02

## 2022-02-01 RX ADMIN — DEXAMETHASONE SODIUM PHOSPHATE 4 MG: 4 INJECTION INTRA-ARTICULAR; INTRALESIONAL; INTRAMUSCULAR; INTRAVENOUS; SOFT TISSUE at 06:02

## 2022-02-01 RX ADMIN — SODIUM BICARBONATE 100 MEQ: 84 INJECTION, SOLUTION INTRAVENOUS at 06:02

## 2022-02-01 RX ADMIN — Medication 2 G: at 02:02

## 2022-02-01 RX ADMIN — PANTOPRAZOLE SODIUM 40 MG: 40 INJECTION, POWDER, FOR SOLUTION INTRAVENOUS at 10:02

## 2022-02-01 RX ADMIN — DEXAMETHASONE SODIUM PHOSPHATE 4 MG: 4 INJECTION INTRA-ARTICULAR; INTRALESIONAL; INTRAMUSCULAR; INTRAVENOUS; SOFT TISSUE at 01:02

## 2022-02-01 RX ADMIN — IOHEXOL 100 ML: 350 INJECTION, SOLUTION INTRAVENOUS at 06:02

## 2022-02-01 NOTE — SUBJECTIVE & OBJECTIVE
Past Medical History:   Diagnosis Date    Seizures      Past Surgical History:   Procedure Laterality Date    BRAIN SURGERY      sEEG left frontotemporal [facility]     SECTION  2010    SURGICAL REMOVAL OF LOBE OF BRAIN Left 2022    Procedure: LEFT CRANIOTOMY FOR TEMPORAL LOBECTOMY WITH BRAINLAB;  Surgeon: Mayra Iraheta MD;  Location: [facility] OR [address] [place] [address];  Service: Neurosurgery;  Laterality: Left;  TORONTO III, ASA III, BLOOD TYPE &CROSS 2UNITS, HEADREST LUCIANO, SUPINE POSITION, BRAINLAB, REGULAR BED    TUBAL LIGATION      Robbin 4 years ago     Family History   Problem Relation Age of Onset    Diabetes Mellitus Mother     Diabetes Mellitus Brother      Social History     Tobacco Use    Smoking status: Never Smoker    Smokeless tobacco: Never Used   Substance Use Topics    Alcohol use: No     Comment: one drink in a few months time     Drug use: Never     Review of patient's allergies indicates:   Allergen Reactions    Carbamazepine Rash    Iodinated contrast media Dermatitis    Escitalopram Rash       Medications: I have reviewed the current medication administration record.    Medications Prior to Admission   Medication Sig Dispense Refill Last Dose    levETIRAcetam (KEPPRA) 1000 MG tablet Take 1,000 mg by mouth 2 (two) times daily.   2022 at 0500    topiramate (TOPAMAX) 200 MG Tab Take 200 mg by mouth 2 (two) times daily.   2022 at 0500    acetaminophen (TYLENOL) 500 MG tablet Take 500 mg by mouth daily as needed for Pain.   More than a month at Unknown time    aspirin/salicylamide/caffeine (BC HEADACHE POWDER ORAL) Take by mouth as needed. Headache   More than a month at Unknown time    ibuprofen (ADVIL,MOTRIN) 800 MG tablet Take 800 mg by mouth 3 (three) times daily as needed.   More than a month at Unknown time    midazolam (NAYZILAM) 5 mg/spray (0.1 mL) Spry 1 spray by Nasal route every 10 (ten) minutes as needed (cluster seizures). Please administer 1 spray  after seizures lasting more than 3 minutes or more that 3 seizures in a 24 hour period, may administer a second dose after 10 min if there is no response to the first dose. Use for treatment of no more than 1 episode (1-2 sprays) every three days, no more than 5 episodes (1-2 sprays) in a month 4 each 3        Review of Systems   Unable to perform ROS: Patient nonverbal   Constitutional: Negative for fever.   HENT: Negative for drooling, hearing loss and rhinorrhea.    Respiratory: Negative for cough.    Gastrointestinal: Negative for diarrhea and vomiting.   Neurological: Positive for facial asymmetry, speech difficulty and weakness.   Psychiatric/Behavioral: Negative for agitation and behavioral problems. The patient is not nervous/anxious.      Objective:     Vital Signs (Most Recent):  Temp: 98 °F (36.7 °C) (02/01/22 0301)  Pulse: 71 (02/01/22 1020)  Resp: 18 (02/01/22 1020)  BP: 126/64 (02/01/22 0701)  SpO2: 100 % (02/01/22 1020)    Vital Signs Range (Last 24H):  Temp:  [96.6 °F (35.9 °C)-98 °F (36.7 °C)]   Pulse:  [59-78]   Resp:  [15-23]   BP: (111-129)/(59-70)   SpO2:  [97 %-100 %]   Arterial Line BP: (105-142)/(48-73)     Physical Exam  Vitals and nursing note reviewed.   Constitutional:       Appearance: She is obese. She is not diaphoretic.   HENT:      Head:      Comments: S/p craniotomy      Right Ear: External ear normal.      Left Ear: External ear normal.      Nose: No rhinorrhea.   Eyes:      General: No scleral icterus.        Right eye: No discharge.         Left eye: No discharge.      Extraocular Movements: Extraocular movements intact.   Cardiovascular:      Rate and Rhythm: Normal rate.   Pulmonary:      Effort: Pulmonary effort is normal. No respiratory distress.   Abdominal:      General: There is no distension.   Musculoskeletal:         General: No deformity or signs of injury.   Skin:     General: Skin is warm and dry.   Neurological:      Mental Status: She is alert.      Motor: Weakness  present.      Comments: Anarthric   RSW            Neurological Exam:   LOC: alert  Attention Span: Good   Language: Mute  Articulation: Mute/Anarthric  Orientation: Untestable due to severe dysarthria   Visual Fields:   EOM (CN III, IV, VI): Full/intact  Facial Movement (CN VII): R facial droop   Motor: 0/5 in RUE and RLE         Laboratory:  CMP:   Recent Labs   Lab 02/01/22  0345   CALCIUM 7.9*   ALBUMIN 3.1*   PROT 6.8      K 3.8   CO2 15*   *   BUN 7   CREATININE 0.7   ALKPHOS 63   ALT 9*   AST 23   BILITOT 0.1     CBC:   Recent Labs   Lab 02/01/22  0345   WBC 13.02*   RBC 4.27   HGB 11.7*   HCT 36.5*      MCV 86   MCH 27.4   MCHC 32.1     Lipid Panel: No results for input(s): CHOL, LDLCALC, HDL, TRIG in the last 168 hours.  Coagulation: No results for input(s): PT, INR, APTT in the last 168 hours.  Hgb A1C:   Recent Labs   Lab 01/27/22  1241   HGBA1C 5.7*     TSH: No results for input(s): TSH in the last 168 hours.    Diagnostic Results:      Brain imaging:  MRI Brain without contrast 1/31/22     1. Acute infarction involving the left lentiform nucleus.  2. Postoperative changes of left craniectomy with anterior temporal lobectomy with expected pneumocephalus and susceptibility artifacts in the operative bed.  3. Sinus disease.    CTH 1/31/22   Status post recent left temporal lobectomy with postoperative changes    Vessel Imaging:  Recommending CTA H/N (pending)     Cardiac Evaluation:   EKG 2/1/22   Sinus rhythm with marked sinus arrythmia   Otherwise normal ECG   Vent. Rate : 066 BPM     Atrial Rate : 066 BPM      P-R Int : 144 ms          QRS Dur : 074 ms       QT Int : 438 ms       P-R-T Axes : 049 063 036 degrees      QTc Int : 459 ms

## 2022-02-01 NOTE — H&P
[facility] - Neuro Critical Care  Neurocritical Care  History & Physical    Admit Date: 2022  Service Date: 2022  Length of Stay: 1    Subjective:     Chief Complaint: Localization-related (focal) (partial) symptomatic epilepsy and epileptic syndromes with complex partial seizures, intractable, without status epilepticus    History of Present Illness: 32 yo F with insignificant PMH presents to [facility] s/p L craniotomy with L temporal lobectomy for intractable left temporal lobe epilepsy. The patient has undergone extensive workup, including sEEG electrode placement at [facility] here in town  that demonstrated all seizures with a mesial temporal onset. She follows with Chelo Stapleton and Pedro. Pt's mother, Karina, reports that Carolyn experiences about 3 events/month presently. She endorses 2 semiologies: in the first, Carolyn's eyes roll back, she then experiences all-over shaking. She also sometimes has staring with drooling episodes. Seizure onset was in . They initially started while she was asleep, but now happen at all times of day, moreso while awake. Empty sella configuration noted on previous MRI from [facility], regarding IIH symptoms: she endorses occasional headaches, but usually only after seizures. There is no headache associated with exertional activities otherwise. She had an allergic reaction to CT contrast and also to tegretol. Pt takes topiramate 200mg bid and keppra 1g bid. Pt admitted to [facility] for monitoring and management of s/p L craniotomy with L temporal lobectomy for intractable left temporal lobe epilepsy.      Past Medical History:   Diagnosis Date    Seizures      Past Surgical History:   Procedure Laterality Date    BRAIN SURGERY      sEEG left frontoteoral [facility]     SECTION  2010    TUBAL LIGATION      Robbin 4 years ago      No current facility-administered medications on file prior to encounter.     Current Outpatient  Medications on File Prior to Encounter   Medication Sig Dispense Refill    levETIRAcetam (KEPPRA) 1000 MG tablet Take 1,000 mg by mouth 2 (two) times daily.      topiramate (TOPAMAX) 200 MG Tab Take 200 mg by mouth 2 (two) times daily.      ibuprofen (ADVIL,MOTRIN) 800 MG tablet Take 800 mg by mouth 3 (three) times daily as needed.      midazolam (NAYZILAM) 5 mg/spray (0.1 mL) Spry 1 spray by Nasal route every 10 (ten) minutes as needed (cluster seizures). Please administer 1 spray after seizures lasting more than 3 minutes or more that 3 seizures in a 24 hour period, may administer a second dose after 10 min if there is no response to the first dose. Use for treatment of no more than 1 episode (1-2 sprays) every three days, no more than 5 episodes (1-2 sprays) in a month 4 each 3      Allergies: Carbamazepine, Iodinated contrast media, and Escitalopram    Family History   Problem Relation Age of Onset    Diabetes Mellitus Mother     Diabetes Mellitus Brother      Social History     Tobacco Use    Smoking status: Never Smoker    Smokeless tobacco: Never Used   Substance Use Topics    Alcohol use: No     Comment: one drink in a few months time     Drug use: Never     Review of Systems   Unable to perform ROS: Acuity of condition     Objective:     Vitals:    Temp: 96.6 °F (35.9 °C)  Pulse: 70  Rhythm: normal sinus rhythm  BP: 123/65  MAP (mmHg): 87  Resp: 16  SpO2: 100 %  O2 Device (Oxygen Therapy): room air    Temp  Min: 96.6 °F (35.9 °C)  Max: 98 °F (36.7 °C)  Pulse  Min: 59  Max: 78  BP  Min: 111/59  Max: 124/64  MAP (mmHg)  Min: 78  Max: 87  Resp  Min: 16  Max: 23  SpO2  Min: 99 %  Max: 100 %    01/31 0701 - 02/01 0700  In: 4176 [I.V.:4026]  Out: 1630 [Urine:1630]           Physical Exam  Constitutional:       General: She is not in acute distress.     Appearance: She is obese.      Comments: somnolent   HENT:      Head:      Comments: post op dressing in place     Right Ear: External ear normal.       Left Ear: External ear normal.      Nose: Nose normal. No rhinorrhea.      Mouth/Throat:      Mouth: Mucous membranes are moist.      Pharynx: Oropharynx is clear.      Comments: Oral airway in place  Eyes:      General:         Right eye: No discharge.         Left eye: No discharge.      Conjunctiva/sclera: Conjunctivae normal.   Cardiovascular:      Rate and Rhythm: Normal rate and regular rhythm.      Pulses: Normal pulses.   Pulmonary:      Effort: Pulmonary effort is normal. No respiratory distress.      Breath sounds: No stridor. No wheezing.      Comments: Oral airway in place  Abdominal:      General: There is no distension.      Palpations: Abdomen is soft. There is no mass.      Tenderness: There is no abdominal tenderness. There is no guarding.   Musculoskeletal:         General: No swelling, deformity or signs of injury.      Cervical back: Neck supple. No rigidity.   Skin:     General: Skin is warm and dry.      Findings: No rash.   Neurological:      Comments: --No sedation, oral airway in place after OR  --GCS [id number]  --Somnolent, opens eyes to loud voice and sternal rub  --Did alert to telling her her mom was in the room, she turned her head toward her mom to look for her  --PERRL 2mm  --L orbit appears fixed looking straight ahead, R horizontal EOMs intact, unable to elicit vertical EOMs  --Follows commands on LUE and LLE, moves spont  --Initially no movement to pain on RUE or RLE-> upon return from MRI does WD RUE and RLE  --corneal deferred  --not gagging on oral airway     Psychiatric:      Comments: Unable to assess d/t loc       Unable to test orientation, language, memory, judgment, insight, fund of knowledge, hearing, shoulder shrug, tongue protrusion, coordination, gait due to level of consciousness.    Today I personally reviewed pertinent medications, lines/drains/airways, imaging, cardiology results, laboratory results, microbiology results, notably:    CTH, MRI    Assessment/Plan:      Neuro  * Localization-related (focal) (partial) symptomatic epilepsy and epileptic syndromes with complex partial seizures, intractable, without status epilepticus  S/p L craniotomy and L temporal lobectomy for refractory epilepsy    -epilepsy following  -NSGY following  -cont home topamax and keppra   - post op CTH with expected post op changes, Pneumocephalus noted anteriorly bilaterally  -MRI in AM  - SBP <140  -dex 4 q6h + H2B  -SG HV drain in place-> management per NSGY  -cefazolin drain ppx  -q1h neuro checks  -EuNa, EuGly  -PT/OT/SLP as appropriate  -hold AC/AP in acute post op setting      Derm  DRESS syndrome  noted    Cardiac/Vascular  Essential hypertension  SBP <140 post op  Cardene, labetalol, hydralazine prn    Endocrine  History of prediabetes  A1C 5.6  SSI and accu checks  Consistent carb diet    Morbid obesity  Nutrition consult    Other  Acute stroke of basal ganglia  -1/31 event: d/t to somnolence, not moving R side, and L fixed gaze, another 1g of keppra (2g total) was given as well as 4mg dex IV, MRI STAT was performed showing acute infarction involving the left lentiform nucleus. Unfortunately not a candidate for tPA d/t surgery, likely iatrogenic in nature. Upon return from MRI, pt WD R side, still drowsy but not requiring sternal rub. GCS 10, still not gagging on oral airway left in place.  -consider liberalizing BP goal to <160 in light of CVA  -NS @75, avoid hypotension  -q1h neuro checks  -ABG pending         The patient is being Prophylaxed for:  Venous Thromboembolism with: Mechanical  Stress Ulcer with: H2B  Ventilator Pneumonia with: not applicable    Activity Orders          Diet Adult Regular (IDDSI Level 7) [facility]; Consistent Carbohydrate: Regular starting at 01/31 2125    Bed rest starting at 01/31 1946        No Order     Critical condition in that Patient has a condition that poses threat to life and bodily function: POD0 L temporal crani with L temporal lobectomy  for intractable focal epilepsy complicated by acute L BG CVA     60 minutes of Critical care time was spent personally by me on the following activities: development of treatment plan with patient or surrogate and bedside caregivers, discussions with consultants, evaluation of patient's response to treatment, examination of patient, ordering and performing treatments and interventions, ordering and review of laboratory studies, ordering and review of radiographic studies, pulse oximetry, antibiotic titration if applicable, vasopressor titration if applicable, re-evaluation of patient's condition. This critical care time did not overlap with that of any other provider or involve time for any procedures. There is high probability for acute neurological change leading to clinical and possibly life-threatening deterioration requiring highest level of physician preparedness for urgent intervention.    Chichi Lopez PA-C  Neurocritical Care  [facility] - Neuro Critical Care

## 2022-02-01 NOTE — SUBJECTIVE & OBJECTIVE
Interval History: POD2 naeon, exam stable with improvement in speech. Continue HV drain, Continue ICU today.    Medications:  Continuous Infusions:   sodium chloride 0.9% Stopped (02/01/22 0625)     Scheduled Meds:   ceFAZolin (ANCEF) IVPB  2 g Intravenous Q8H    dexamethasone  4 mg Intravenous Q6H    enoxaparin  40 mg Subcutaneous Daily    famotidine  20 mg Oral BID    levetiracetam IV  1,000 mg Intravenous Q12H    modafiniL  100 mg Oral QAM    mupirocin   Nasal BID    topiramate  200 mg Oral BID    white petrolatum-mineral oiL   Right Eye BID     PRN Meds:acetaminophen, calcium gluconate IVPB, calcium gluconate IVPB, calcium gluconate IVPB, dextrose 50%, dextrose 50%, glucagon (human recombinant), glucose, glucose, hydrALAZINE, HYDROcodone-acetaminophen, insulin aspart U-100, labetaloL, magnesium sulfate IVPB, magnesium sulfate IVPB, ondansetron, ondansetron, potassium chloride in water **AND** potassium chloride in water **AND** potassium chloride in water, potassium, sodium phosphates, potassium, sodium phosphates, potassium, sodium phosphates     Review of Systems  Objective:     Weight: 118.8 kg (262 lb)  Body mass index is 43.6 kg/m².  Vital Signs (Most Recent):  Temp: 98.2 °F (36.8 °C) (02/02/22 0705)  Pulse: 80 (02/02/22 0905)  Resp: 17 (02/02/22 0905)  BP: 124/73 (02/02/22 0905)  SpO2: 100 % (02/02/22 0905) Vital Signs (24h Range):  Temp:  [97.8 °F (36.6 °C)-98.2 °F (36.8 °C)] 98.2 °F (36.8 °C)  Pulse:  [69-92] 80  Resp:  [13-28] 17  SpO2:  [92 %-100 %] 100 %  BP: (117-142)/(68-86) 124/73  Arterial Line BP: ()/() 132/119                          Closed/Suction Drain 01/31/22 1842 Left Other (Comment) Accordion 10 Fr. (Active)   Site Description Unable to view 02/01/22 0401   Dressing Type Gauze 02/01/22 0401   Dressing Status Clean;Dry;Intact 02/01/22 0401   Dressing Intervention Integrity maintained 02/01/22 0401   Drainage Bloody 02/01/22 0401   Status To bulb suction 02/01/22 0401    Output (mL) 110 mL 02/01/22 0601       Physical Exam    Neurosurgery Physical Exam  Awake, expressive aphasia, disoriented  PERRL, right facial droop  Follows commands briskly left side full strength  Localizing RUE 3/5, WD RLE    Significant Labs:  Recent Labs   Lab 01/31/22 2025 01/31/22 2234 02/01/22 0345 02/02/22  0042   *  --  123* 95     --  138 139   K 3.9  --  3.8 3.9   *  --  117* 115*   CO2 15*  --  15* 14*   BUN 9  --  7 7   CREATININE 0.6  --  0.7 0.6   CALCIUM 7.5*  --  7.9* 7.9*   MG  --  2.0 2.1 2.1     Recent Labs   Lab 01/31/22 2025 02/01/22 0345 02/02/22 0042   WBC 11.60 13.02* 13.18*   HGB 11.7* 11.7* 10.7*   HCT 35.8* 36.5* 33.8*    242 214     No results for input(s): LABPT, INR, APTT in the last 48 hours.  Microbiology Results (last 7 days)     ** No results found for the last 168 hours. **        All pertinent labs from the last 24 hours have been reviewed.    Significant Diagnostics:  I have reviewed all pertinent imaging results/findings within the past 24 hours.  I have reviewed and interpreted all pertinent imaging results/findings within the past 24 hours.Interval History: 2/1 Patient POD s/p anterior temporal lobectomy. Post op MRI with acute infarct adjacent and deep to surgical bed, patient with new right hemiparesis and aphasia. Continue AEDs, steroids, continue to monitor exam and close montoring in ICU today    Medications:  Continuous Infusions:   sodium chloride 0.9% Stopped (02/01/22 0701)     Scheduled Meds:   ceFAZolin (ANCEF) IVPB  2 g Intravenous Q8H    dexamethasone  4 mg Intravenous Q6H    famotidine  20 mg Oral BID    levetiracetam IV  1,000 mg Intravenous Q12H    mupirocin   Nasal BID    topiramate  200 mg Oral BID     PRN Meds:acetaminophen, calcium gluconate IVPB, calcium gluconate IVPB, calcium gluconate IVPB, dextrose 50%, dextrose 50%, glucagon (human recombinant), glucose, glucose, hydrALAZINE, HYDROcodone-acetaminophen,  insulin aspart U-100, labetaloL, magnesium sulfate IVPB, magnesium sulfate IVPB, ondansetron, ondansetron, potassium chloride in water **AND** potassium chloride in water **AND** potassium chloride in water, potassium, sodium phosphates, potassium, sodium phosphates, potassium, sodium phosphates     Review of Systems    Objective:     Weight: 119.2 kg (262 lb 12.6 oz)  Body mass index is 43.73 kg/m².  Vital Signs (Most Recent):  Temp: 98 °F (36.7 °C) (02/01/22 0301)  Pulse: 71 (02/01/22 1020)  Resp: 18 (02/01/22 1020)  BP: 126/64 (02/01/22 0701)  SpO2: 100 % (02/01/22 1020) Vital Signs (24h Range):  Temp:  [96.6 °F (35.9 °C)-98 °F (36.7 °C)] 98 °F (36.7 °C)  Pulse:  [59-78] 71  Resp:  [15-23] 18  SpO2:  [97 %-100 %] 100 %  BP: (111-129)/(59-70) 126/64  Arterial Line BP: (105-142)/(48-73) 142/72                          Closed/Suction Drain 01/31/22 1842 Left Other (Comment) Accordion 10 Fr. (Active)   Site Description Unable to view 02/01/22 0401   Dressing Type Gauze 02/01/22 0401   Dressing Status Clean;Dry;Intact 02/01/22 0401   Dressing Intervention Integrity maintained 02/01/22 0401   Drainage Bloody 02/01/22 0401   Status To bulb suction 02/01/22 0401   Output (mL) 110 mL 02/01/22 0601       Physical Exam      Neurosurgery Physical Exam    Awake, expressive aphasia, disoriented  PERRL, right facial droop  Follows commands briskly left side full strength  Localizing RUE 3/5, WD RLE    Significant Labs:  Recent Labs   Lab 01/31/22 2025 01/31/22 2234 02/01/22  0345   *  --  123*     --  138   K 3.9  --  3.8   *  --  117*   CO2 15*  --  15*   BUN 9  --  7   CREATININE 0.6  --  0.7   CALCIUM 7.5*  --  7.9*   MG  --  2.0 2.1     Recent Labs   Lab 01/31/22  1759 01/31/22 2025 02/01/22  0345   WBC  --  11.60 13.02*   HGB  --  11.7* 11.7*   HCT 31* 35.8* 36.5*   PLT  --  261 242     No results for input(s): LABPT, INR, APTT in the last 48 hours.  Microbiology Results (last 7 days)     ** No  results found for the last 168 hours. **        All pertinent labs from the last 24 hours have been reviewed.    Significant Diagnostics:  I have reviewed all pertinent imaging results/findings within the past 24 hours.  I have reviewed and interpreted all pertinent imaging results/findings within the past 24 hours.

## 2022-02-01 NOTE — CONSULTS
[facility] Neuro Critical [facility]  Neurology-Epilepsy  Consult Note    Patient Name: Carolyn Mccullough  MRN: [id number]  Admission Date: 1/31/2022  Hospital Length of Stay: 1 days  Code Status: No Order   Attending Provider: Sakshi Bird MD   Consulting Provider: Melissa Cox PA-C  Primary Care Physician: Fadumo Laughlin MD  Principal Problem:Localization-related (focal) (partial) symptomatic epilepsy and epileptic syndromes with complex partial seizures, intractable, without status epilepticus    Consults   Subjective:     HPI:   Ms. 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 Patient was referred to [facility] for surgical evaluation by Brenda Garcia completed prior to referral with onset in the left mesial temporal structures however broad zone of abnormal activity extending across left temporal lobe structures. Neuropsychology testing completed which lateralized dominant hemisphere to the left and noted signifciant baseline deficits. Repeat MRI brain at [facility] was notable for possible small encephalocele. Patient was presented at multidisciplinary Epilepsy surgery conference, and after case review patient was offered surgical management by left anterior temporal lobectomy, or more conservative management with left mesial temporal laser ablation. Patient elected to proceed with left anterior temporal lobectomy, completed 1/31/22. Post-operatively, patient noted to have minimal movement to RUE/RLE, sent for MRI brain which showed acute infarct adjacent and deep to surgical bed. Neurology Epilepsy following for assistance in AED management.    Dr. Vasquez Clinic Visit  HPI 1/10/2022:      Age of first seizure: 2011 (17yo)  Seizure Risk Factors: no family history of seizures, no CNS infections, No childhood seizures, no head strike with LOC, term repeat C section with no issue and  no prolonged hospitalization   Time of Last Seizure: 12/18/2021   # of lifetime Seizures: 700?  Frequency of Seizures: at most 5 in one day, at the worst, daily, multiple a day. Currently, 2-4 per month.  Seizure Triggers: used to be in sleep, now can be any time of day   Injuries/Hospitalization for seizures? Right hand sprain, right hand burn, ED with hurt hand and cluster seizures, tip/side tongue bite, side of cheek bite    Pregnancy?  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  Tubal ligation 2016  Folic Acid? no  Bone Health: no dexa      Auras: not always with a warning, may have a sensation that a seizure is going to happen consisting of having sensation of seeing actions before they occur; sometimes will be standing and just fall out with no warning       Seizure Events:   1. Oral automatisms (grimacing, teeth grinding, hypersalivation), staring, eyes rolling, which may progress into generalized convulsion with tongue bite; when from sleep associated with an ictal cry       Current AED/SEs:  1. Topiramate 200 mg twice daily SE none    2. Levetiracetam 1000 mg twice daily SE none       Previous AED/SEs or reason for DC.   1. Valproic acid  2. Brivaracetam 50 mg twice daily -> hallucinations  3. Vimpat   4. Carbamazepine     06/19/2020 Neuropsychological evaluation: Carolyn presented with diffuse cognitive impairment including deficits in problem solving, reasoning, and comprehension.  These skills were below a 2018 evaluation and her preillness functioning.  Current testing resulted in her difficulties with activities of daily living suggests impaired intellectual abilities.  Carolyn also exhibits deficits in nonlateralizing cognitive skills including working memory and processing speed.  In contrast, Carolyn showed deficits in word finding, comprehension of language, and use of grammar.  These difficulty suggests  greater left hemisphere involvement, particularly Broca's area.  Learning and memory information has improved from the 2018 assessment and her verbal and visual abilities were similarly developed.  For instance, she exhibited an intact ability to learn and retain rote, simplified information but these abilities declined when information increased in complexity.  This pattern suggests that working memory deficits limit her ability to learn lengthier information.  Visual motor skills were relatively strong although impairments were noted in visual spatial judgment and fine motor coordination and speed.  Finally this evaluation did not find emotional or behavioral concerns which suggest improvements over time.     sEEG  [id number]/29/2020-removed 11/01/2020, discharged 11/05/2020:  Leads placed in 1. Left amygdala (AMG), left hippocampus (HIP), left superior temporal gyrus (STEMP), left superior temporal gyrus2 (STEMP2), left inferior temporal (INTEMP), left posterior temporal (PTEMP) left posterior temporal (PTEMP2).  Interictal epileptiform activity: 1. There is near continuous delta slowing with superimposed fast activity and embedded epileptiform spikes noted over hippocampal area (HIP1,2). 2. There are long runs of sharp waves intermixed with delta slowing noted over the amygdala (AMG 1,2).  Frequent spike waves maximal over superior temporal leads (STEMP1) electrode contacts 2-5.  This activity becomes more rhythmic and periodic in that latter half of the recording.4.  There are intermittent short runs of spike and slow wave noted over inferior temporal area (INTEMP 2-3).  New discharges appeared over PTEMP2 1-3, which were frequent in appearance and often seen in bursts or runs of activity.  Interpretation:  These findings are suggestive of left-sided epileptogenic cerebral dysfunction noted broadly within the left hippocampus and posterior temporal regions as evidence by frequent interictal activity over these  cortical regions as well as seizures that localized to these areas.  The attenuation of interictal activity over the HIP 1-2 leads several seconds before clinical and more obvious electrographic onset, could indicate early involvement of that region in the seizure onset.  However at the DC shift noted over the PTEMP1 leads could also indicate an area of epileptogenic onset.    Based on the entirety of this 4 day intracranial EEG monitoring in the multitude of seizures captured, it is hypothesized that the ictal onset zone lays in close proximity to HIP1-2 and PTEMP 1-4.  This patient was discussed with an epilepsy surgical conference, and the decision was made to offer the patient a left anterior temporal lobectomy with amygdalohippocampectomy.     Leads removed on 11/01/2020 after patient pulled out 4 of 7 needs during an episode of postictal confusion.  She did develop fevers, and a workup was performed including CXR, blood in urine cultures, and a CT Head w/wo contrast.  These were overall unrevealing.  She was started on empiric vancomycin and cefepime by the PICU for her fevers, which subsequently resolved.  Wound Care was consulted for a left back fold wound and wound care was initiated.  Her incision sites from her leads remain c/d/i, with no evidence of leak.  She remained afebrile for 24 hours off antibiotics, and was deemed stable for discharge home.  On 11/04/2020, new and worsening bilateral lower extremity and patchy lower upper extremity redness and swelling without a discrete rash concerning for possible cellulitis.  Dermatology and PICU were consulted, and they were concerned for possible DRESS syndrome, although she has been on her current AEDs for some time.  Given the complexity of her care and need for further allergy and immunology evaluation, the decision was made to transfer her to an adult hospital for further management of her medical issues.  At this point, there are no further  neurosurgical needs in she requires medical management of her bilateral lower extremity edema and erythema.  She was transferred [facility] the hospitalist [facility] at [facility].     [facility] is mild parietal predominant cerebral atrophy, moderate diffuse cerebellar atrophy and partially empty sella with pituitary flattened along the floor of the sella.  No additional significant MR abnormality or abnormal enhancement of the intracranial contents is evident.  The ventricles are normal in size and configuration.  No acute intracranial hemorrhage, extra-axial collection, midline shift or herniation is evident.  The paranasal sinuses and mastoid air cells are clear.  The orbits are intact.  There is mild calvarial thickening.  Impression:  Cerebral and cerebellar atrophy with calvarial thickening compatible with chronic sequelae of epilepsy and antiepileptic medications.  No acute intracranial abnormality.     Functional brain MRI:  Functional imaging demonstrated left hemispheric dominance for language     [facility]:  03/17/2020-03/18/2020; 8-9 hz PDR, persistent low-voltage delta and theta slowing was appreciated broadly over the left temporal region.  At times, this slowing appears rhythmic in nature (TIRDA).  Epileptiform spikes and sharp waves were also appreciated over the left temporal region maximally seen in the T3/T5 leads.  Rarely, volume conduction of these discharges were noted to involve the right parasagittal region.  These discharges were noted to wax and wane in frequency, most notably seen in drowsiness and sleep.  No seizures captured.  Interictal SPECT scan performed 03/17/2020.  Impression:  This is an abnormal 21 hour prolonged EEG with accompanying video monitoring in wakefulness, drowsiness, and sleep due to the following features: 1. Persistent slowing over the left temporal region, frequently noted to be rhythmic in nature (TIRDA), 2.  Epileptiform discharges noted maximally over the left mid-temporal region, seen most frequently in drowsiness and sleep.  Interpretation:  These findings are consistent with a focal area of potentially epileptogenic cerebral dysfunction noted broadly over the left mid-temporal region.  There were no seizures noted during this day in the EEG recording.  The patient did have a seizure on 03/16/2020.  Interictal SPECT scan radionucleotide injection was performed during this day of the EEG recording.  Compared to the previous day's study, the slowing noted over the left hemisphere has noted to become more pronounced and persistent.     PET scan 08/14/2019 ([place]):  Unremarkable metabolic brain study in     [facility] 06/23/2020:  Left hemispheric language and memory lateralization (anesthetic agent used: propofol) -> the results of the intracarotid diprivan (modified [place]) test are difficult to interpret considering prolonged generalized sedating propofol (diprivan) effects as compared to previous experience with amobarbital.  Interpretable portions of the test showed left hemispheric language and memory lateralization.     MRI brain 06/17/2019:  Persistent atrophy of bilateral parietal occipital lobes, unchanged; increased frontotemporal and cerebellar atrophy compared to previous studies; no acute intracranial abnormality     [facility] 07/16/2018-07/20/2018:  Two left temporal focal onset seizures with alteration of awareness, abundant focal left mid temporal interictal epileptiform activity, less frequent independent right temporal interictal epileptiform activity     EEG 06/09/2016 - normal     MRI brain 06/09/2016 w w/o small left frontal vascular finding consistent with benign venous developmental abnormality.  Benign empty sella.      Other Allergies:  Carbamazepine -rash, escitalopram -rash     AED compliance, adherence: no missed doses       Hospital Course:   1/31/21: Left anterior temporal lobectomy  completed by NSGY for surgical management of refractory epilepsy. Post-operatively, patient noted to have right hemiparesis, aphasia. MRI brain showing acute infarction involving the left lentiform nucleus. Patient continued on home AED regimen - Levetiracetam 1000 mg BID, Topiramate 200 mg BID, however unable to receive TPM given current NPO status.  21: Patient continued on Levetiracetam 1000 mg BID, pending NG placement for Topiramate. If unable to obtain enteral access, recommend increasing Levetiracetam to 1500 mg BID until it is established. Vascular Neurology sonsulted,appeciate recommendations.        Past Medical History:   Diagnosis Date    Seizures        Past Surgical History:   Procedure Laterality Date    BRAIN SURGERY      sEEG left [facility]     SECTION      SURGICAL REMOVAL OF LOBE OF BRAIN Left 2022    Procedure: LEFT CRANIOTOMY FOR TEMPORAL LOBECTOMY WITH BRAINLAB;  Surgeon: Mayra Iraheta MD;  Location: [facility] OR [address] [place] [address];  Service: Neurosurgery;  Laterality: Left;  TORONTO III, ASA III, BLOOD TYPE &CROSS 2UNITS, HEADREST LUCIANO, SUPINE POSITION, BRAINLAB, REGULAR BED    TUBAL LIGATION      [place] 4 years ago       Review of patient's allergies indicates:   Allergen Reactions    Carbamazepine Rash    Iodinated contrast media Dermatitis    Escitalopram Rash       No current facility-administered medications on file prior to encounter.     Current Outpatient Medications on File Prior to Encounter   Medication Sig    levETIRAcetam (KEPPRA) 1000 MG tablet Take 1,000 mg by mouth 2 (two) times daily.    topiramate (TOPAMAX) 200 MG Tab Take 200 mg by mouth 2 (two) times daily.    ibuprofen (ADVIL,MOTRIN) 800 MG tablet Take 800 mg by mouth 3 (three) times daily as needed.    midazolam (NAYZILAM) 5 mg/spray (0.1 mL) Spry 1 spray by Nasal route every 10 (ten) minutes as needed (cluster seizures). Please administer 1 spray after seizures lasting more than  3 minutes or more that 3 seizures in a 24 hour period, may administer a second dose after 10 min if there is no response to the first dose. Use for treatment of no more than 1 episode (1-2 sprays) every three days, no more than 5 episodes (1-2 sprays) in a month     Continuous Infusions:   sodium chloride 0.9% Stopped (02/01/22 0701)       Family History     Problem Relation (Age of Onset)    Diabetes Mellitus Mother, Brother        Tobacco Use    Smoking status: Never Smoker    Smokeless tobacco: Never Used   Substance and Sexual Activity    Alcohol use: No     Comment: one drink in a few months time     Drug use: Never    Sexual activity: Not on file     Review of Systems   Unable to perform ROS: Other     Objective:     Vital Signs (Most Recent):  Temp: 98 °F (36.7 °C) (02/01/22 0301)  Pulse: 71 (02/01/22 1020)  Resp: 18 (02/01/22 1020)  BP: 126/64 (02/01/22 0701)  SpO2: 100 % (02/01/22 1020) Vital Signs (24h Range):  Temp:  [96.6 °F (35.9 °C)-98 °F (36.7 °C)] 98 °F (36.7 °C)  Pulse:  [59-78] 71  Resp:  [15-23] 18  SpO2:  [97 %-100 %] 100 %  BP: (111-129)/(59-70) 126/64  Arterial Line BP: (105-142)/(48-73) 142/72     Weight: 119.2 kg (262 lb 12.6 oz)  Body mass index is 43.73 kg/m².    Physical Exam  Vitals and nursing note reviewed.   Constitutional:       Appearance: She is not toxic-appearing or diaphoretic.   HENT:      Head:      Comments: Surgical dressing in place  Eyes:      General: No scleral icterus.     Conjunctiva/sclera: Conjunctivae normal.      Pupils: Pupils are equal, round, and reactive to light.   Cardiovascular:      Rate and Rhythm: Normal rate.   Pulmonary:      Effort: Pulmonary effort is normal. No respiratory distress.   Abdominal:      General: There is no distension.      Palpations: Abdomen is soft.   Musculoskeletal:         General: No deformity or signs of injury.      Cervical back: Neck supple. No rigidity.   Skin:     General: Skin is warm and dry.   Neurological:       Mental Status: She is alert.   Psychiatric:      Comments: Unable to assess         NEUROLOGICAL EXAMINATION:     MENTAL STATUS        Alert, tracks examiner       CRANIAL NERVES     CN III, IV, VI   Pupils are equal, round, and reactive to light.       R facial droop  Expressive aphasia       MOTOR EXAM        Withdraws to noxious stimuli on right  Spontaneous movements on left, follows commands       Significant Labs: All pertinent lab results from the past 24 hours have been reviewed.    Significant Studies: I have reviewed all pertinent imaging results/findings within the past 24 hours.    Assessment and Plan:     * Localization-related (focal) (partial) symptomatic epilepsy and epileptic syndromes with complex partial seizures, intractable, without status epilepticus  32 yo female with intractable epilepsy admitted to NICU after left anterior temporal lobectomy for surgical management. Post-op MRI with acute infarct involving left lentiform nucleus.    Recommendations:  - Continue outpatient AED regimen - Levetiracetam 1000 mg BID, Topiramate 200 mg BID  - SLP evaluated patient 2/1 and recommending NPO, if unable to obtain enteral access for administration of Topiramate, recommend increasing Levetiracetam to 1500 mg BID   - Agree with Vascular Neurology consult for further management of acute infarct  - Further imaging/workup per NSGY, Vascular, NCC  - Seizure precautions    Plan of care discussed with NCC team. Will continue to follow peripherally, please call with any questions.    Thrombotic stroke involving left middle cerebral artery  - Noted on post-op MRI, patient with right hemiparesis, aphasia on admission to [facility]  - Vascular Neurology consulted, further recommendations pending    Essential hypertension  - Vitals reviewed, BP management per NCC        VTE Risk Mitigation (From admission, onward)         Ordered     Place sequential compression device  Until discontinued         01/31/22 1033     Place  ERON hose  Until discontinued         01/31/22 1033                Thank you for your consult. We will follow peripherally. Please contact us with any questions.    Melissa Cox PA-C  Neurology-Epilepsy  [facility] - [facility]  Staff: Dr. Fritz

## 2022-02-01 NOTE — NURSING
Patient arrived to [facility] from [facility] OR >> PACU >> [facility]     Type of stroke/diagnosis:  Left crani for temporal lobectomy    TPA start and end time (if applicable)    Thrombectomy start and end time (if applicable)    Current symptoms: lethargic from anaesthesia, localizing, responding to pain    Skin assessment done: Yes  Wounds noted: none    *If wounds noted, was Wound Care consulted? N/A    Nieves Completed? No, too lethargic    Patient Belongings on Admit: none    NCC notified: Kalpesh Jacobsen MD

## 2022-02-01 NOTE — HOSPITAL COURSE
02/01/2022: New acute BG infarct noted on post-op CT. Stroke Consult. Slight improvement of exam throughout the day.   02/02/2022: CTA overnight unremarkable. Qd modafinil.   02/03/2022: Step down to NSGY. Improved alertness with modafinil. Diet started.

## 2022-02-01 NOTE — HPI
Pt is a 31F with intractable epilepsy who presents on 1/31 for elective left anterior temporal lobectomy for refractory epilepsy.     Dr. Iraheta's Clinic Note (1/20/22):  Carolyn Mccullough is a 31 y.o. female who presents as a referral from Dr. Stapleton to discuss definitive surgery for treatment of intractable epilepsy. She has undergone extensive workup at [facility], and Dr. Stapleton has shared all relevant records with us, including raw sEEG data and neuropsycholgical evaluation. We have also directly obtained the images, including post-sEEG CT head, to assess the electrode placement.      Based on these data, together with the additional imaging findings, and after extensive interdisciplinary discussion in conference and with Dr. Stapleton, I have offered the patient a choice between laser ablation of the amygdala and hippocampus or anterior temporal lobectomy. 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 There is a growing body of literature suggesting that encephaloceles are potentially epileptogenic; however, without actually having an electrode in the region, I cannot be sure if it is contributory in her case.      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 We discussed that this will carry an increased risk of neuropsychological risk, particularly with respect to speech. The patient and her mother expressed understanding. After deliberation, they prefer to proceed with KERLINE

## 2022-02-01 NOTE — PROGRESS NOTES
[facility]  Neurosurgery  Progress Note    Subjective:     History of Present Illness: Pt is a 31F with intractable epilepsy who presents on 1/31 for elective left anterior temporal lobectomy.      Post-Op Info:  Procedure(s) (LRB):  LEFT CRANIOTOMY FOR TEMPORAL LOBECTOMY WITH BRAINLAB (Left)   1 Day Post-Op     Interval History: 2/1 Patient POD s/p anterior temporal lobectomy. Post op MRI with acute infarct adjacent and deep to surgical bed, patient with new right hemiparesis and aphasia. Continue AEDs, steroids, continue to monitor exam and close montoring in ICU today    Medications:  Continuous Infusions:   sodium chloride 0.9% Stopped (02/01/22 0701)     Scheduled Meds:   ceFAZolin (ANCEF) IVPB  2 g Intravenous Q8H    dexamethasone  4 mg Intravenous Q6H    famotidine  20 mg Oral BID    levetiracetam IV  1,000 mg Intravenous Q12H    mupirocin   Nasal BID    topiramate  200 mg Oral BID     PRN Meds:acetaminophen, calcium gluconate IVPB, calcium gluconate IVPB, calcium gluconate IVPB, dextrose 50%, dextrose 50%, glucagon (human recombinant), glucose, glucose, hydrALAZINE, HYDROcodone-acetaminophen, insulin aspart U-100, labetaloL, magnesium sulfate IVPB, magnesium sulfate IVPB, ondansetron, ondansetron, potassium chloride in water **AND** potassium chloride in water **AND** potassium chloride in water, potassium, sodium phosphates, potassium, sodium phosphates, potassium, sodium phosphates     Review of Systems    Objective:     Weight: 119.2 kg (262 lb 12.6 oz)  Body mass index is 43.73 kg/m².  Vital Signs (Most Recent):  Temp: 98 °F (36.7 °C) (02/01/22 0301)  Pulse: 71 (02/01/22 1020)  Resp: 18 (02/01/22 1020)  BP: 126/64 (02/01/22 0701)  SpO2: 100 % (02/01/22 1020) Vital Signs (24h Range):  Temp:  [96.6 °F (35.9 °C)-98 °F (36.7 °C)] 98 °F (36.7 °C)  Pulse:  [59-78] 71  Resp:  [15-23] 18  SpO2:  [97 %-100 %] 100 %  BP: (111-129)/(59-70) 126/64  Arterial Line BP: (105-142)/(48-73)  142/72                          Closed/Suction Drain 01/31/22 1842 Left Other (Comment) Accordion 10 Fr. (Active)   Site Description Unable to view 02/01/22 0401   Dressing Type Gauze 02/01/22 0401   Dressing Status Clean;Dry;Intact 02/01/22 0401   Dressing Intervention Integrity maintained 02/01/22 0401   Drainage Bloody 02/01/22 0401   Status To bulb suction 02/01/22 0401   Output (mL) 110 mL 02/01/22 0601       Physical Exam      Neurosurgery Physical Exam    Awake, expressive aphasia, disoriented  PERRL, right facial droop  Follows commands briskly left side full strength  Localizing RUE 3/5, WD RLE    Significant Labs:  Recent Labs   Lab 01/31/22 2025 01/31/22 2234 02/01/22  0345   *  --  123*     --  138   K 3.9  --  3.8   *  --  117*   CO2 15*  --  15*   BUN 9  --  7   CREATININE 0.6  --  0.7   CALCIUM 7.5*  --  7.9*   MG  --  2.0 2.1     Recent Labs   Lab 01/31/22 1759 01/31/22 2025 02/01/22  0345   WBC  --  11.60 13.02*   HGB  --  11.7* 11.7*   HCT 31* 35.8* 36.5*   PLT  --  261 242     No results for input(s): LABPT, INR, APTT in the last 48 hours.  Microbiology Results (last 7 days)     ** No results found for the last 168 hours. **        All pertinent labs from the last 24 hours have been reviewed.    Significant Diagnostics:  I have reviewed all pertinent imaging results/findings within the past 24 hours.  I have reviewed and interpreted all pertinent imaging results/findings within the past 24 hours.    Assessment/Plan:     * Localization-related (focal) (partial) symptomatic epilepsy and epileptic syndromes with complex partial seizures, intractable, without status epilepticus  Pt is 31F with temporal epilepsy who is now s/p left craniotomy for anterior temporal lobectomy.    2/1 Patient POD s/p anterior temporal lobectomy. Post op MRI with acute infarct adjacent and deep to surgical bed, patient with new right hemiparesis and aphasia. Continue AEDs, steroids, continue to  monitor exam and close montoring in ICU today    Plan:  Admitted to [facility], Q1h neurochecks in post acute period  -- All diagnostics reviewed   -- CTH post op satisfactory   -- MRI brain 2/1 with expected changes as well as acute infarct deep structures adjacent to surgical bed.  -- HV in place, abx while in  -- Continue dex 4q6, GI PPx while on steroids  -- Continue home AED keppra and topiramate  -- Aggressive PTOT SLP post op  -- SLP today, ADAT  -- Okay for SQH 24h post op  Please do not hesitate to contact neurosurgery on call for any questions or cocnerns      Ian Vega MD  Neurosurgery  [facility]

## 2022-02-01 NOTE — CONSULTS
General Neurology Consult Note    Consult received for AED management.     Epilepsy team is following and managing AEDs.    General Neurology will sign off at this time.       Kalpesh Jacob MD, MPH  Neurology Resident - [id number]  Department of Neurology  [address] [place] [address]

## 2022-02-01 NOTE — HOSPITAL COURSE
1/31: POD 0 anterior temporal lobectomy. D/t to somnolence, not moving R side, and L fixed gaze, another 1g of keppra (2g total) was given as well as 4mg dex IV, MRI STAT was performed showing acute infarction involving the left lentiform nucleus. Unfortunately not a candidate for tPA d/t surgery, likely iatrogenic in nature. Upon return from MRI, pt WD R side, still drowsy but not requiring sternal rub. GCS 10, still not gagging on oral airway left in place  2/1: Patient POD 1 s/p anterior temporal lobectomy. Post op CTH with expected post op changes. Post op MRI with acute infarct adjacent and deep to surgical bed, patient with new right hemiparesis and aphasia. Continue AEDs, steroids, subgaleal HV drain, and continue to monitor exam and close montoring in ICU today. Epilepsy following, continue home AEDS.   2/2: CTA overnight unremarkable. Speech with mild improvement, continue aggressive PTOT SLP. Maintain HV drain. Modafinal trial.    2/3: Step down to NSGY. Improved alertness with modafinil. Diet started. HV drain removed. Start SQH.  2/4: NAEON. AFVSS. Patient stepped down to floor care. Smith replaced yesterday due to retention. Flomax started. Plan for smith removal Sunday. Denies headaches, seizures, or new weakness.   2/5: NAEON. Patient neuro stable with right hemiparesis. Remove smith tomorrow for voiding trial. Continue aggressive therapy. PT/OT recommending IPR. Patient medically stable for discharge to IPR pending placement, likely Monday.   2/6 naeon, exam stable, smith removed this am. Pending placement.   2/7: NAEON. Neuro stable. Voiding spontaneously. Pending IPR. Denies headaches, new onset weakness, or seizures.   2/8: NAEON. Right lower extremity paresis improving. No headaches or seizure activity. Pending IPR, plan for transfer today. Dc instructions discussed with patient's aunt who voiced understanding.

## 2022-02-01 NOTE — HOSPITAL COURSE
"1/31/21: Left anterior temporal lobectomy completed by NSANIL for surgical management of refractory epilepsy. Post-operatively, patient noted to have right hemiparesis, aphasia. MRI brain showing acute infarction involving the left lentiform nucleus. Patient continued on home AED regimen - Levetiracetam 1000 mg BID, Topiramate 200 mg BID, however unable to receive TPM given current NPO status.  2/1/21: Patient continued on Levetiracetam 1000 mg BID, pending NG placement for Topiramate. If unable to obtain enteral access, recommend increasing Levetiracetam to 1500 mg BID until it is established. Vascular Neurology sonsulted,appeciate recommendations.   2/2/21: Patient tolerating PO medications crushed, on home Topiramate 200 mg BID, Levetiracetam 1000 mg BID. Some mumbled speech in response to questions, difficult to understand, at times more clear per nursing. Withdrawal to noxious stimuli on R, toes now down-going on R.   2/3/21: Some improvement noted in speech, able to state "alright", follows commands on left. No noted seizures. Stepped down to NSGY.   2/4/21: No acute events overnight, continued on home AED regimen. Pending IPR placement/transfer. Able to repeat some words, some paraphasias noted. R hemiparesis.   "

## 2022-02-01 NOTE — ASSESSMENT & PLAN NOTE
30 yo female with intractable epilepsy admitted to NICU after left anterior temporal lobectomy for surgical management. Post-op MRI with acute infarct involving left lentiform nucleus.    Recommendations:  - Continue outpatient AED regimen - Levetiracetam 1000 mg BID, Topiramate 200 mg BID  - SLP evaluated patient 2/1 and recommending NPO, if unable to obtain enteral access for administration of Topiramate, recommend increasing Levetiracetam to 1500 mg BID   - Agree with Vascular Neurology consult for further management of acute infarct  - Further imaging/workup per NSGY, Vascular, NCC  - Seizure precautions    Plan of care discussed with NCC team. Will continue to follow peripherally, please call with any questions.

## 2022-02-01 NOTE — SUBJECTIVE & OBJECTIVE
Past Medical History:   Diagnosis Date    Seizures        Past Surgical History:   Procedure Laterality Date    BRAIN SURGERY      sEEG left frontotemporal [facility]     SECTION  2010    SURGICAL REMOVAL OF LOBE OF BRAIN Left 2022    Procedure: LEFT CRANIOTOMY FOR TEMPORAL LOBECTOMY WITH BRAINLAB;  Surgeon: Mayra Iraheta MD;  Location: [facility] OR [address] [place] [address];  Service: Neurosurgery;  Laterality: Left;  TORONTO III, ASA III, BLOOD TYPE &CROSS 2UNITS, HEADREST LUCIANO, SUPINE POSITION, BRAINLAB, REGULAR BED    TUBAL LIGATION      Robbin 4 years ago       Review of patient's allergies indicates:   Allergen Reactions    Carbamazepine Rash    Iodinated contrast media Dermatitis    Escitalopram Rash       No current facility-administered medications on file prior to encounter.     Current Outpatient Medications on File Prior to Encounter   Medication Sig    levETIRAcetam (KEPPRA) 1000 MG tablet Take 1,000 mg by mouth 2 (two) times daily.    topiramate (TOPAMAX) 200 MG Tab Take 200 mg by mouth 2 (two) times daily.    ibuprofen (ADVIL,MOTRIN) 800 MG tablet Take 800 mg by mouth 3 (three) times daily as needed.    midazolam (NAYZILAM) 5 mg/spray (0.1 mL) Spry 1 spray by Nasal route every 10 (ten) minutes as needed (cluster seizures). Please administer 1 spray after seizures lasting more than 3 minutes or more that 3 seizures in a 24 hour period, may administer a second dose after 10 min if there is no response to the first dose. Use for treatment of no more than 1 episode (1-2 sprays) every three days, no more than 5 episodes (1-2 sprays) in a month     Continuous Infusions:   sodium chloride 0.9% Stopped (22 0701)       Family History     Problem Relation (Age of Onset)    Diabetes Mellitus Mother, Brother        Tobacco Use    Smoking status: Never Smoker    Smokeless tobacco: Never Used   Substance and Sexual Activity    Alcohol use: No     Comment: one drink in a few months time      Drug use: Never    Sexual activity: Not on file     Review of Systems   Unable to perform ROS: Other     Objective:     Vital Signs (Most Recent):  Temp: 98 °F (36.7 °C) (02/01/22 0301)  Pulse: 71 (02/01/22 1020)  Resp: 18 (02/01/22 1020)  BP: 126/64 (02/01/22 0701)  SpO2: 100 % (02/01/22 1020) Vital Signs (24h Range):  Temp:  [96.6 °F (35.9 °C)-98 °F (36.7 °C)] 98 °F (36.7 °C)  Pulse:  [59-78] 71  Resp:  [15-23] 18  SpO2:  [97 %-100 %] 100 %  BP: (111-129)/(59-70) 126/64  Arterial Line BP: (105-142)/(48-73) 142/72     Weight: 119.2 kg (262 lb 12.6 oz)  Body mass index is 43.73 kg/m².    Physical Exam  Vitals and nursing note reviewed.   Constitutional:       Appearance: She is not toxic-appearing or diaphoretic.   HENT:      Head:      Comments: Surgical dressing in place  Eyes:      General: No scleral icterus.     Conjunctiva/sclera: Conjunctivae normal.      Pupils: Pupils are equal, round, and reactive to light.   Cardiovascular:      Rate and Rhythm: Normal rate.   Pulmonary:      Effort: Pulmonary effort is normal. No respiratory distress.   Abdominal:      General: There is no distension.      Palpations: Abdomen is soft.   Musculoskeletal:         General: No deformity or signs of injury.      Cervical back: Neck supple. No rigidity.   Skin:     General: Skin is warm and dry.   Neurological:      Mental Status: She is alert.   Psychiatric:      Comments: Unable to assess         NEUROLOGICAL EXAMINATION:     MENTAL STATUS        Alert, tracks examiner       CRANIAL NERVES     CN III, IV, VI   Pupils are equal, round, and reactive to light.       R facial droop  Expressive aphasia       MOTOR EXAM        Withdraws to noxious stimuli on right  Spontaneous movements on left, follows commands       Significant Labs: All pertinent lab results from the past 24 hours have been reviewed.    Significant Studies: I have reviewed all pertinent imaging results/findings within the past 24 hours.

## 2022-02-01 NOTE — ASSESSMENT & PLAN NOTE
Pt is 31F with temporal epilepsy who is now s/p left craniotomy for anterior temporal lobectomy.    2/1 Patient POD s/p anterior temporal lobectomy. Post op MRI with acute infarct adjacent and deep to surgical bed, patient with new right hemiparesis and aphasia. Continue AEDs, steroids, continue to monitor exam and close montoring in ICU today    Plan:  Admitted to [facility], Q1h neurochecks in post acute period  -- All diagnostics reviewed   -- CTH post op satisfactory   -- MRI brain 2/1 with expected changes as well as acute infarct deep structures adjacent to surgical bed.  -- HV in place, abx while in  -- Continue dex 4q6, GI PPx while on steroids  -- Continue home AED keppra and topiramate  -- Aggressive PTOT SLP post op  -- SLP today, ADAT  -- Okay for SQH 24h post op  Please do not hesitate to contact neurosurgery on call for any questions or cocnerns

## 2022-02-01 NOTE — ASSESSMENT & PLAN NOTE
Pt is 31F with temporal epilepsy who is now s/p left craniotomy for anterior temporal lobectomy.    2/1 Patient POD s/p anterior temporal lobectomy. Post op MRI with acute infarct adjacent and deep to surgical bed, patient with new right hemiparesis and aphasia. Continue AEDs, steroids, continue to monitor exam and close montoring in ICU today    Plan:  Admitted to [facility], Q1h neurochecks in post acute period  -- All diagnostics reviewed   -- CTH post op satisfactory   -- MRI brain 2/1 with expected changes as well as acute infarct deep structures adjacent to surgical bed.  -- HV in place, abx while in  -- Continue dex 4q6, GI PPx while on steroids  -- Continue home AED keppra and topiramate  -- Aggressive PTOT SLP post op  -- SLP today, ADAT  -- Okay for SQH  -- Bowel regimen  Please do not hesitate to contact neurosurgery on call for any questions or cocnerns

## 2022-02-01 NOTE — NURSING TRANSFER
Nursing Transfer Note      2/1/2022     Reason patient is being transferred: Room Change    Transfer From: [facility] 9067 > [facility] 90    Transfer via bed    Transfer with cardiac monitoring, Oral airway in place    Transported by KARLIE Alonso/KARLIE Sánchez    Medicines sent: Yes    Any special needs or follow-up needed: None    Chart send with patient: Yes    Notified: Mother at bedside transferred with patient    Patient reassessed at: 0800 02/01/2022    Upon arrival to floor: cardiac monitor applied, patient oriented to room, call bell in reach and bed in lowest position, wheels locked, Keegan RN at bedside to assume care of patient

## 2022-02-01 NOTE — ASSESSMENT & PLAN NOTE
S/p L craniotomy and L temporal lobectomy for refractory epilepsy    -epilepsy following  -NSGY following  -cont home topamax and keppra   - post op CTH with expected post op changes, Pneumocephalus noted anteriorly bilaterally  -MRI in AM  - SBP <140  -dex 4 q6h + H2B  -SG HV drain in place-> management per NSGY  -cefazolin drain ppx  -q1h neuro checks  -EuNa, EuGly  -PT/OT/SLP as appropriate  -hold AC/AP in acute post op setting

## 2022-02-01 NOTE — NURSING TRANSFER
Nursing Transfer Note      1/31/2022         Transfer To: 9067    Transfer via bed    Transfer with n/a    Transported by rn, pct    Medicines sent: O2 6L via simple facemask    Any special needs or follow-up needed: STAT CT, multiple attempts to reach CT made in PACU, no response from either CT area    Chart send with patient: Yes    Notified: family via text service    Patient reassessed at: 1/31/2022 @ 1652

## 2022-02-01 NOTE — ASSESSMENT & PLAN NOTE
- Noted on post-op MRI, patient with right hemiparesis, aphasia on admission to NICU  - Vascular Neurology consulted, further recommendations pending

## 2022-02-01 NOTE — SUBJECTIVE & OBJECTIVE
Interval History:  New acute BG infarct noted on post-op CT. Stroke Consult. Slight improvement of exam throughout the day.     Review of Systems   Unable to perform ROS: Acuity of condition     Objective:     Vitals:  Temp: 98 °F (36.7 °C)  Pulse: 71  Rhythm: normal sinus rhythm  BP: 126/64  MAP (mmHg): 87  Resp: 18  SpO2: 100 %  O2 Device (Oxygen Therapy): room air    Temp  Min: 96.6 °F (35.9 °C)  Max: 98 °F (36.7 °C)  Pulse  Min: 59  Max: 78  BP  Min: 111/59  Max: 129/65  MAP (mmHg)  Min: 78  Max: 91  Resp  Min: 15  Max: 23  SpO2  Min: 97 %  Max: 100 %    01/31 0701 - 02/01 0700  In: 4474.6 [I.V.:4180.4]  Out: 2740 [Urine:2630; Drains:110]           Physical Exam  Constitutional: Well-nourished, well-developed. No obvious distress.  Eyes: Clear conjunctiva. Anicteric. No discharge. Lids without lesions.  HEENT: MMM. Nose, external ears atraumatic. Surgical dressing c/d/i.  Cardio: RRR. Pulses intact. Capillary refill time < 2 seconds.  Respiratory: Clear to auscultation. Regular effort.  GI: Bowel sounds present. Soft, non-distended, non-tender.    Neurologic:  [id number]  R-sided facial droop  Expressive aphasia  PERRL, EOMI  Follows commands and MEGAN and AG on left side  WD on right side      Medications:  Continuoussodium chloride 0.9%, Last Rate: Stopped (02/01/22 0701)    ScheduledceFAZolin (ANCEF) IVPB, 2 g, Q8H  dexamethasone, 4 mg, Q6H  famotidine, 20 mg, BID  levetiracetam IV, 1,000 mg, Q12H  mupirocin, , BID  topiramate, 200 mg, BID    PRNacetaminophen, 650 mg, Q4H PRN  calcium gluconate IVPB, 1 g, PRN  calcium gluconate IVPB, 2 g, PRN  calcium gluconate IVPB, 3 g, PRN  dextrose 50%, 12.5 g, PRN  dextrose 50%, 25 g, PRN  glucagon (human recombinant), 1 mg, PRN  glucose, 16 g, PRN  glucose, 24 g, PRN  hydrALAZINE, 20 mg, Q6H PRN  HYDROcodone-acetaminophen, 1 tablet, Q4H PRN  insulin aspart U-100, 0-5 Units, QID (AC + HS) PRN  labetaloL, 10 mg, Q4H PRN  magnesium sulfate IVPB, 2 g, PRN  magnesium sulfate  IVPB, 4 g, PRN  ondansetron, 8 mg, Q8H PRN  ondansetron, 4 mg, Q12H PRN  potassium chloride in water, 40 mEq, PRN   And  potassium chloride in water, 60 mEq, PRN   And  potassium chloride in water, 80 mEq, PRN  potassium, sodium phosphates, 2 packet, PRN  potassium, sodium phosphates, 2 packet, PRN  potassium, sodium phosphates, 2 packet, PRN      Today I personally reviewed pertinent medications, lines/drains/airways, imaging, cardiology results, laboratory results, microbiology results, notably:    Diet  Diet Adult Regular (IDDSI Level 7) [facility]; Consistent Carbohydrate  Diet Adult Regular (IDDSI Level 7) [facility]; Consistent Carbohydrate

## 2022-02-01 NOTE — HPI
32 yo F with insignificant PMH presents to [facility] s/p L craniotomy with L temporal lobectomy for intractable left temporal lobe epilepsy. The patient has undergone extensive workup, including sEEG electrode placement at [facility] here in town 10-11/2020 that demonstrated all seizures with a mesial temporal onset. She follows with Chelo Stapleton and Pedro. Pt's mother, Karina, reports that Carolyn experiences about 3 events/month presently. She endorses 2 semiologies: in the first, Carolyn's eyes roll back, she then experiences all-over shaking. She also sometimes has staring with drooling episodes. Seizure onset was in 2009. They initially started while she was asleep, but now happen at all times of day, moreso while awake. Empty sella configuration noted on previous MRI from [facility], regarding IIH symptoms: she endorses occasional headaches, but usually only after seizures. There is no headache associated with exertional activities otherwise. She had an allergic reaction to CT contrast and also to tegretol. Pt takes topiramate 200mg bid and keppra 1g bid. Pt admitted to [facility] for monitoring and management of s/p L craniotomy with L temporal lobectomy for intractable left temporal lobe epilepsy.

## 2022-02-01 NOTE — SUBJECTIVE & OBJECTIVE
Past Medical History:   Diagnosis Date    Seizures      Past Surgical History:   Procedure Laterality Date    BRAIN SURGERY      sEEG left frontotemporal [facility]     SECTION  2010    TUBAL LIGATION      [place] 4 years ago      No current facility-administered medications on file prior to encounter.     Current Outpatient Medications on File Prior to Encounter   Medication Sig Dispense Refill    levETIRAcetam (KEPPRA) 1000 MG tablet Take 1,000 mg by mouth 2 (two) times daily.      topiramate (TOPAMAX) 200 MG Tab Take 200 mg by mouth 2 (two) times daily.      ibuprofen (ADVIL,MOTRIN) 800 MG tablet Take 800 mg by mouth 3 (three) times daily as needed.      midazolam (NAYZILAM) 5 mg/spray (0.1 mL) Spry 1 spray by Nasal route every 10 (ten) minutes as needed (cluster seizures). Please administer 1 spray after seizures lasting more than 3 minutes or more that 3 seizures in a 24 hour period, may administer a second dose after 10 min if there is no response to the first dose. Use for treatment of no more than 1 episode (1-2 sprays) every three days, no more than 5 episodes (1-2 sprays) in a month 4 each 3      Allergies: Carbamazepine, Iodinated contrast media, and Escitalopram    Family History   Problem Relation Age of Onset    Diabetes Mellitus Mother     Diabetes Mellitus Brother      Social History     Tobacco Use    Smoking status: Never Smoker    Smokeless tobacco: Never Used   Substance Use Topics    Alcohol use: No     Comment: one drink in a few months time     Drug use: Never     Review of Systems   Unable to perform ROS: Acuity of condition     Objective:     Vitals:    Temp: 96.6 °F (35.9 °C)  Pulse: 70  Rhythm: normal sinus rhythm  BP: 123/65  MAP (mmHg): 87  Resp: 16  SpO2: 100 %  O2 Device (Oxygen Therapy): room air    Temp  Min: 96.6 °F (35.9 °C)  Max: 98 °F (36.7 °C)  Pulse  Min: 59  Max: 78  BP  Min: 111/59  Max: 124/64  MAP (mmHg)  Min: 78  Max: 87  Resp  Min: 16  Max:  23  SpO2  Min: 99 %  Max: 100 %    01/31 0701 - 02/01 0700  In: 4176 [I.V.:4026]  Out: 1630 [Urine:1630]           Physical Exam  Constitutional:       General: She is not in acute distress.     Appearance: She is obese.      Comments: somnolent   HENT:      Head:      Comments: post op dressing in place     Right Ear: External ear normal.      Left Ear: External ear normal.      Nose: Nose normal. No rhinorrhea.      Mouth/Throat:      Mouth: Mucous membranes are moist.      Pharynx: Oropharynx is clear.      Comments: Oral airway in place  Eyes:      General:         Right eye: No discharge.         Left eye: No discharge.      Conjunctiva/sclera: Conjunctivae normal.   Cardiovascular:      Rate and Rhythm: Normal rate and regular rhythm.      Pulses: Normal pulses.   Pulmonary:      Effort: Pulmonary effort is normal. No respiratory distress.      Breath sounds: No stridor. No wheezing.      Comments: Oral airway in place  Abdominal:      General: There is no distension.      Palpations: Abdomen is soft. There is no mass.      Tenderness: There is no abdominal tenderness. There is no guarding.   Musculoskeletal:         General: No swelling, deformity or signs of injury.      Cervical back: Neck supple. No rigidity.   Skin:     General: Skin is warm and dry.      Findings: No rash.   Neurological:      Comments: --No sedation, oral airway in place after OR  --GCS [id number]  --Somnolent, opens eyes to loud voice and sternal rub  --Did alert to telling her her mom was in the room, she turned her head toward her mom to look for her  --PERRL 2mm  --L orbit appears fixed looking straight ahead, R horizontal EOMs intact, unable to elicit vertical EOMs  --Follows commands on LUE and LLE, moves spont  --Initially no movement to pain on RUE or RLE-> upon return from MRI does WD RUE and RLE  --corneal deferred  --not gagging on oral airway     Psychiatric:      Comments: Unable to assess d/t loc       Unable to test  orientation, language, memory, judgment, insight, fund of knowledge, hearing, shoulder shrug, tongue protrusion, coordination, gait due to level of consciousness.    Today I personally reviewed pertinent medications, lines/drains/airways, imaging, cardiology results, laboratory results, microbiology results, notably:    CTH, MRI

## 2022-02-01 NOTE — OP NOTE
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 female with intractable epilepsy who presents for left temporal lobectomy, based on the recommendations of the interdisciplinary conference. She underwent sEEG monitoring at an outside facility.      We discussed the specific risks of the surgery. Risks include, but are not limited to, bleeding, pain, infection, scarring, need for further/repeat procedure, death, paralysis, stroke (including venous infarct)/damage to major blood vessels, leak of cerebrospinal fluid, damage to cranial nerves, visual field defects, memory changes, verbal changes/word-finding difficulty,  and hardware-related complications. We may fail to improve seizure frequency/severity. We discussed that my partner Dr. Tracy would also be involved in the surgical process. Informed consent was obtained.      Technical Procedures Used:   1. Left craniotomy for anterior temporal lobectomy and resection of encephalocele   2. Use of intraoperative microscope and microsurgical technique   3. Use of neuronavigation      Description of the Procedure:   The patient was brought back to the operating room, and general endotracheal anesthesia  was induced. An arterial line was placed, and instructions were given to keep SBP <140 throughout the case. She was carefully positioned supine with a large bump under her left shoulder to allow the left mastoid tip to be the highest point in the field. Her head was affixed to the table with a Lopez 3-point head clamp. She was registered to the neuronavigation system with appropriate accuracy.      A modified reverse-question-farhan incision was designed from the root of the zygoma to several centimeters above the keyhole, posterior to the hairline. We made this slightly larger than usual to incorporate the previous seeg incisions and avoid the previous mini-craniotomy incision. A time out was performed. The incision was injected with 20 cc of 1% lidocaine with epinephrine. The patient received 2 g ceftriaxone, 8 of dexamethasone, and a gram of Keppra. Incision was made with a 15 blade, and dissection was carried down with Bovie cautery. Bipolar at 20 was used for hemostasis where indicated. Care was taken to preserve a cuff of temporalis fascia for re-approximation of the temporalis. A myocutaneous flap was elevated. The root of the zygoma was again identified.      We designed bur holes just above the root of the zygoma and also on the zygoamtic process of the frontal bone. One of the previous dog bones was removed. The bur holes were connected with the craniotome and the M8 where needed over the sphenoid ridge. The bone flap was elevated with the Penfield #3. The sphenoid ridge was drilled down with the M8 and liberally waxed. We also performed a subtemporal decompression. Tack-up stitches were placed.      Once the bony work was completed, we opened the dura in a c-shape, flapping it down over the temporalis. Care was taken to measure 3.5 cm from the temporal tip. A cortisectomy was performed. The neuronavigation wand was used to confirm the location of the temporal horn, towards which we oriented our  trajectory. We worked medially until reaching the temporal horn. We placed a micro-cottonoid in the horn and then worked anteriorly, toward the temporal tip. We resected the lateral neocortex. We identified the encephalocele, resected the brain, and packed the defect with surgicel.      Next, we identified the choroid plexus before bringing in the microscope. We placed the Sow retractor, taking care not to rise superior to the choroid plexus into the temporal stem. With subpial technique, we resected the amygdala and hippocampus, together with the parahippocampal gyrus. We worked carefully to perform adequate subpial dissection. Care was taken to minimize use of cautery around the tentorium.      We identified the tentorial edge, the third nerve, and the PCA as the tail of the hippocampus was curving toward the peduncle.      Once we were satisfied that we were sufficiently posterior, we worked to obtain meticulous hemostasis. The cavity was lined with Surgicel. The cavity was dry prior to its being filled with liquid thrombin.      We performed closure of the dura with 4.0 Nurolon stitches. We placed Floseal and dry gel foam over the craniotomy defect. The bone was reaffixed with titanium plates and screws.      We placed a subgaleal hemovac drain. We reapproximated the temporalis and its fascia with 2.0 Vicryl stitches. The galea was closed with 2.0 inverted Vicryl stitches. The skin was closed with staples.      The patient was carefully removed from pins, and a sterile dressing of bacitracin ointment, Telfa, and head wrap was applied.      The patient was then awakened by the anesthesia service and taken to ICU-level recovery in satisfactory condition.      Two staff neurosurgeons participated in order to minimize operative time. This case warrants a 22 modifier due to habitus and also given the fact that this was a re-do operation after previous mini-craniotomy at the previous treating institution.       Complications: No     Estimated Blood Loss (EBL): 150cc           Specimens:   Specimen (24h ago, onward)             Start     Ordered    01/31/22 1902  Specimen to Pathology, Surgery Neurosurgery  Once        Comments: Pre-op Diagnosis: Localization-related (focal) (partial) symptomatic epilepsy and epileptic syndromes with complex partial seizures, intractable, with status epilepticus [G40.211]    Procedure(s):  LEFT CRANIOTOMY FOR TEMPORAL LOBECTOMY WITH BRAINLAB     Number of specimens:     Name of specimens:     1. Left Paleocortex - Permanent   References:    Click here for ordering Quick Tip   Question Answer Comment   Procedure Type: Neurosurgery    Specimen Class: Routine/Screening    Release to patient Immediate        01/31/22 1902                 Implants:   Implant Name Type Inv. Item Serial No.  Lot No. LRB No. Used Action   PLATE CRANIAL UN3 BOX LG 2X2 - [id number]  PLATE CRANIAL UN3 BOX LG 2X2  [employer].  Left 1 Implanted   SCREW UN3 AXS SD 1.5X4MM - [id number]  SCREW UN3 AXS SD 1.5X4MM  [employer].  Left 8 Implanted   PLATE BONE BUR HOLE COVER 10MM - [id number]  PLATE BONE BUR HOLE COVER 10MM  [employer].  Left 1 Implanted   COVER UN3 BURRHOLE 14MM TAB - [id number]  COVER UN3 BURRHOLE 14MM TAB  [employer].  Left 1 Implanted   SCREW UN3 AXS SD 1.5X3MM - [id number]  SCREW UN3 AXS SD 1.5X3MM  [employer].  Left 3 Implanted   SCREW UN3 AXS SD 1.5X3MM - [id number]  SCREW UN3 AXS SD 1.5X3MM  [employer].  Left 1 Wasted              Condition: Stable    Disposition: ICU - extubated and stable.    Attestation: I was present and scrubbed for the entire procedure.

## 2022-02-01 NOTE — BRIEF OP NOTE
Tyrel Oneal - Surgery ([facility])  Brief Operative Note    SUMMARY     Surgery Date: 1/31/2022     Surgeon(s) and Role:     * Mayra Iraheta MD - Primary     * Anderson Ac MD - Resident - Assisting     * Jose Tracy MD - Co-Surgeon        Pre-op Diagnosis:  Localization-related (focal) (partial) symptomatic epilepsy and epileptic syndromes with complex partial seizures, intractable, with status epilepticus [G40.211]    Post-op Diagnosis:  Post-Op Diagnosis Codes:     * Localization-related (focal) (partial) symptomatic epilepsy and epileptic syndromes with complex partial seizures, intractable, with status epilepticus [G40.211]    Procedure(s) (LRB):  LEFT CRANIOTOMY FOR TEMPORAL LOBECTOMY WITH BRAINLAB (Left)    Anesthesia: General    Operative Findings: left temporal craniotomy for anterior temporal lobectomy     Estimated Blood Loss: * No values recorded between 1/31/2022  2:32 PM and 1/31/2022  7:30 PM *    Estimated Blood Loss has been documented.         Specimens:   Specimen (24h ago, onward)             Start     Ordered    01/31/22 1902  Specimen to Pathology, Surgery Neurosurgery  Once        Comments: Pre-op Diagnosis: Localization-related (focal) (partial) symptomatic epilepsy and epileptic syndromes with complex partial seizures, intractable, with status epilepticus [G40.211]    Procedure(s):  LEFT CRANIOTOMY FOR TEMPORAL LOBECTOMY WITH BRAINLAB     Number of specimens:     Name of specimens:     1. Left Paleocortex - Permanent   References:    Click here for ordering Quick Tip   Question Answer Comment   Procedure Type: Neurosurgery    Specimen Class: Routine/Screening    Release to patient Immediate        01/31/22 1902                [id number]

## 2022-02-01 NOTE — PLAN OF CARE
Recommend that pt. Be npo with strict aspiration precautions  Problem: SLP Goal  Goal: SLP Goal  Description: Goals due 2/8  1.  Pt. Will participate in ongoing assessment of swallow  2.  Pt. Will participate in speech language evaluation  Outcome: Ongoing, Progressing

## 2022-02-01 NOTE — ASSESSMENT & PLAN NOTE
-1/31 event: d/t to somnolence, not moving R side, and L fixed gaze, another 1g of keppra (2g total) was given as well as 4mg dex IV, MRI STAT was performed showing acute infarction involving the left lentiform nucleus. Unfortunately not a candidate for tPA d/t surgery, likely iatrogenic in nature. Upon return from MRI, pt WD R side, still drowsy but not requiring sternal rub. GCS 10, still not gagging on oral airway left in place.  -consider liberalizing BP goal to <160 in light of CVA  -NS @75, avoid hypotension  -q1h neuro checks  -ABG pending

## 2022-02-01 NOTE — SUBJECTIVE & OBJECTIVE
Interval History: 2/1 Patient POD s/p anterior temporal lobectomy. Post op MRI with acute infarct adjacent and deep to surgical bed, patient with new right hemiparesis and aphasia. Continue AEDs, steroids, continue to monitor exam and close montoring in ICU today    Medications:  Continuous Infusions:   sodium chloride 0.9% 75 mL/hr at 02/01/22 0601     Scheduled Meds:   ceFAZolin (ANCEF) IVPB  2 g Intravenous Q8H    dexamethasone  4 mg Intravenous Q6H    famotidine  20 mg Oral BID    levetiracetam IV  1,000 mg Intravenous Q12H    mupirocin   Nasal BID    topiramate  200 mg Oral BID     PRN Meds:acetaminophen, calcium gluconate IVPB, calcium gluconate IVPB, calcium gluconate IVPB, dextrose 50%, dextrose 50%, glucagon (human recombinant), glucose, glucose, hydrALAZINE, HYDROcodone-acetaminophen, insulin aspart U-100, labetaloL, magnesium sulfate IVPB, magnesium sulfate IVPB, ondansetron, ondansetron, potassium chloride in water **AND** potassium chloride in water **AND** potassium chloride in water, potassium, sodium phosphates, potassium, sodium phosphates, potassium, sodium phosphates     Review of Systems  Objective:     Weight: 119.2 kg (262 lb 12.6 oz)  Body mass index is 43.73 kg/m².  Vital Signs (Most Recent):  Temp: 98 °F (36.7 °C) (02/01/22 0301)  Pulse: 78 (02/01/22 0601)  Resp: 17 (02/01/22 0601)  BP: 129/65 (02/01/22 0601)  SpO2: 100 % (02/01/22 0601) Vital Signs (24h Range):  Temp:  [96.6 °F (35.9 °C)-98 °F (36.7 °C)] 98 °F (36.7 °C)  Pulse:  [59-78] 78  Resp:  [15-23] 17  SpO2:  [97 %-100 %] 100 %  BP: (111-129)/(59-70) 129/65  Arterial Line BP: (105-142)/(48-73) 142/71                          Closed/Suction Drain 01/31/22 1842 Left Other (Comment) Accordion 10 Fr. (Active)   Site Description Unable to view 02/01/22 0401   Dressing Type Gauze 02/01/22 0401   Dressing Status Clean;Dry;Intact 02/01/22 0401   Dressing Intervention Integrity maintained 02/01/22 0401   Drainage Bloody 02/01/22 0401    Status To bulb suction 02/01/22 0401   Output (mL) 110 mL 02/01/22 0601       Physical Exam    Neurosurgery Physical Exam  Awake, expressive aphasia, disoriented  PERRL, right facial droop  Follows commands briskly left side full strength  Localizing RUE 3/5, WD RLE    Significant Labs:  Recent Labs   Lab 01/31/22 2025 01/31/22 2234 02/01/22  0345   *  --  123*     --  138   K 3.9  --  3.8   *  --  117*   CO2 15*  --  15*   BUN 9  --  7   CREATININE 0.6  --  0.7   CALCIUM 7.5*  --  7.9*   MG  --  2.0 2.1     Recent Labs   Lab 01/31/22 1759 01/31/22 2025 02/01/22 0345   WBC  --  11.60 13.02*   HGB  --  11.7* 11.7*   HCT 31* 35.8* 36.5*   PLT  --  261 242     No results for input(s): LABPT, INR, APTT in the last 48 hours.  Microbiology Results (last 7 days)     ** No results found for the last 168 hours. **        All pertinent labs from the last 24 hours have been reviewed.    Significant Diagnostics:  I have reviewed all pertinent imaging results/findings within the past 24 hours.  I have reviewed and interpreted all pertinent imaging results/findings within the past 24 hours.

## 2022-02-01 NOTE — TRANSFER OF CARE
"Anesthesia Transfer of Care Note    Patient: Carolyn Mccullough    Procedure(s) Performed: Procedure(s) (LRB):  LEFT CRANIOTOMY FOR TEMPORAL LOBECTOMY WITH BRAINLAB (Left)    Patient location: PACU    Anesthesia Type: general    Transport from OR: Transported from OR on 6-10 L/min O2 by face mask with adequate spontaneous ventilation    Post pain: adequate analgesia    Post assessment: no apparent anesthetic complications and tolerated procedure well    Post vital signs: stable    Level of consciousness: sedated    Nausea/Vomiting: no nausea/vomiting    Complications: none    Transfer of care protocol was followed      Last vitals:   Visit Vitals  /62   Pulse 73   Temp 36.4 °C (97.5 °F) (Temporal)   Resp 16   Ht 5' 5" (1.651 m)   Wt 118.4 kg (261 lb)   LMP 01/24/2022 (Exact Date)   SpO2 100%   Breastfeeding No   BMI 43.43 kg/m²     "

## 2022-02-01 NOTE — SIGNIFICANT EVENT
STAT CTA ordered by Vascular Neuro. Patient has contrast allergy listed in chart. Per family, patient had previous reaction of hives to Gene LE when given contrast. Because of urgent nature of CTA, contrast prep to be bypassed. Risks of contrast discussed with family including but not limited to hives, itching, SOB, anaphylaxis, airway compromise and death. Family member at bedside aware of risks and wants to proceed with contrast administration for CTA. 1x 50 mg hydrocortisone and 1 x benadryl ordered prior to scan.

## 2022-02-01 NOTE — ASSESSMENT & PLAN NOTE
Patient is a 31 year old female with PMH of epilepsy. She was admitted to [facility] for s/p L craniotomy with L temporal lobectomy for intractable epilepsy. Following procedure, patient became very drowsy and had developed RSW with a fixed L gaze. Keppra and dexamethasone given by primary team. MRI was then obtained which showed a L BG infarct. Stroke team was consulted for further recommendations. Patient anarthric on exam. Comprehension appears to be grossly intact. R side hemiplegia noted. Etiology unclear at this time.     Recommend getting CTA H/N for vessel imaging.     Antithrombotics for secondary stroke prevention: Antiplatelets: Aspirin: 81 mg daily    Statins for secondary stroke prevention and hyperlipidemia, if present:   Statins: Atorvastatin- 40 mg daily    Aggressive risk factor modification: Obesity     Rehab efforts: The patient has been evaluated by a stroke team provider and the therapy needs have been fully considered based off the presenting complaints and exam findings. The following therapy evaluations are needed: PT evaluate and treat, OT evaluate and treat, SLP evaluate and treat, PM&R evaluate for appropriate placement    Diagnostics ordered/pending: CTA Head to assess vasculature , CTA Neck/Arch to assess vasculature    VTE prophylaxis: Heparin 5000 units SQ every 8 hours  Mechanical prophylaxis: Place SCDs    BP parameters: Infarct: No intervention, SBP <220

## 2022-02-01 NOTE — ASSESSMENT & PLAN NOTE
1/31 event: d/t to somnolence, not moving R side, and L fixed gaze, another 1g of keppra (2g total) was given as well as 4mg dex IV, MRI STAT was performed showing acute infarction involving the left lentiform nucleus. Unfortunately not a candidate for tPA d/t surgery, likely iatrogenic in nature. Upon return from MRI, pt WD R side, still drowsy but not requiring sternal rub. GCS 10, still not gagging on oral airway left in place.  -consider liberalizing BP goal to <160 in light of CVA  -NS @75, avoid hypotension  -q1h neuro checks  -ABG ,O2 sat stable on room air

## 2022-02-01 NOTE — ASSESSMENT & PLAN NOTE
S/p L craniotomy and L temporal lobectomy for refractory epilepsy with new L BG infarct noted on post-op MRI    -epilepsy, NSGY following  -vascular neuro consulted today  -cont home topamax and keppra   -post op CTH with expected post op changes, nneumocephalus noted anteriorly bilaterally  -post op MRI with new L BG infarct   -SBP <140, prn hydralazine and labetalol  -dex 4 q6h + H2B  -SG HV drain in place with cefazolin ppx, management per NSGY  -q1h neuro, vital checks  -EuNa, EuGly  -PT/OT/SLP as appropriate  -hold AC/AP in acute post op setting

## 2022-02-01 NOTE — PROGRESS NOTES
[facility] - Neuro Critical Care  Neurocritical Care  Progress Note    Admit Date: 1/31/2022  Service Date: 02/01/2022  Length of Stay: 1    Subjective:     Chief Complaint: Localization-related (focal) (partial) symptomatic epilepsy and epileptic syndromes with complex partial seizures, intractable, without status epilepticus    History of Present Illness: 32 yo F with insignificant PMH presents to [facility] s/p L craniotomy with L temporal lobectomy for intractable left temporal lobe epilepsy. The patient has undergone extensive workup, including sEEG electrode placement at [facility] here in town 10-11/2020 that demonstrated all seizures with a mesial temporal onset. She follows with Chelo Stapleton and Pedro. Pt's mother, Karina, reports that Carolyn experiences about 3 events/month presently. She endorses 2 semiologies: in the first, Carolyn's eyes roll back, she then experiences all-over shaking. She also sometimes has staring with drooling episodes. Seizure onset was in 2009. They initially started while she was asleep, but now happen at all times of day, moreso while awake. Empty sella configuration noted on previous MRI from [facility], regarding IIH symptoms: she endorses occasional headaches, but usually only after seizures. There is no headache associated with exertional activities otherwise. She had an allergic reaction to CT contrast and also to tegretol. Pt takes topiramate 200mg bid and keppra 1g bid. Pt admitted to [facility] for monitoring and management of s/p L craniotomy with L temporal lobectomy for intractable left temporal lobe epilepsy.      Hospital Course: 02/01/2022: New acute BG infarct noted on post-op CT. Stroke Consult. Slight improvement of exam throughout the day.       Interval History:  New acute BG infarct noted on post-op CT. Stroke Consult. Slight improvement of exam throughout the day.     Review of Systems   Unable to perform ROS: Acuity of condition     Objective:      Vitals:  Temp: 98 °F (36.7 °C)  Pulse: 71  Rhythm: normal sinus rhythm  BP: 126/64  MAP (mmHg): 87  Resp: 18  SpO2: 100 %  O2 Device (Oxygen Therapy): room air    Temp  Min: 96.6 °F (35.9 °C)  Max: 98 °F (36.7 °C)  Pulse  Min: 59  Max: 78  BP  Min: 111/59  Max: 129/65  MAP (mmHg)  Min: 78  Max: 91  Resp  Min: 15  Max: 23  SpO2  Min: 97 %  Max: 100 %    01/31 0701 - 02/01 0700  In: 4474.6 [I.V.:4180.4]  Out: 2740 [Urine:2630; Drains:110]           Physical Exam  Constitutional: Well-nourished, well-developed. No obvious distress.  Eyes: Clear conjunctiva. Anicteric. No discharge. Lids without lesions.  HEENT: MMM. Nose, external ears atraumatic. Surgical dressing c/d/i.  Cardio: RRR. Pulses intact. Capillary refill time < 2 seconds.  Respiratory: Clear to auscultation. Regular effort.  GI: Bowel sounds present. Soft, non-distended, non-tender.    Neurologic:  E3V1M6  R-sided facial droop  Expressive aphasia  PERRL, EOMI  Follows commands and MEGAN and AG on left side  WD on right side      Medications:  Continuoussodium chloride 0.9%, Last Rate: Stopped (02/01/22 0701)    ScheduledceFAZolin (ANCEF) IVPB, 2 g, Q8H  dexamethasone, 4 mg, Q6H  famotidine, 20 mg, BID  levetiracetam IV, 1,000 mg, Q12H  mupirocin, , BID  topiramate, 200 mg, BID    PRNacetaminophen, 650 mg, Q4H PRN  calcium gluconate IVPB, 1 g, PRN  calcium gluconate IVPB, 2 g, PRN  calcium gluconate IVPB, 3 g, PRN  dextrose 50%, 12.5 g, PRN  dextrose 50%, 25 g, PRN  glucagon (human recombinant), 1 mg, PRN  glucose, 16 g, PRN  glucose, 24 g, PRN  hydrALAZINE, 20 mg, Q6H PRN  HYDROcodone-acetaminophen, 1 tablet, Q4H PRN  insulin aspart U-100, 0-5 Units, QID (AC + HS) PRN  labetaloL, 10 mg, Q4H PRN  magnesium sulfate IVPB, 2 g, PRN  magnesium sulfate IVPB, 4 g, PRN  ondansetron, 8 mg, Q8H PRN  ondansetron, 4 mg, Q12H PRN  potassium chloride in water, 40 mEq, PRN   And  potassium chloride in water, 60 mEq, PRN   And  potassium chloride in water, 80 mEq,  PRN  potassium, sodium phosphates, 2 packet, PRN  potassium, sodium phosphates, 2 packet, PRN  potassium, sodium phosphates, 2 packet, PRN      Today I personally reviewed pertinent medications, lines/drains/airways, imaging, cardiology results, laboratory results, microbiology results, notably:    Diet  Diet Adult Regular (IDDSI Level 7) [facility]; Consistent Carbohydrate  Diet Adult Regular (IDDSI Level 7) [facility]; Consistent Carbohydrate        Assessment/Plan:     Neuro  * Localization-related (focal) (partial) symptomatic epilepsy and epileptic syndromes with complex partial seizures, intractable, without status epilepticus  S/p L craniotomy and L temporal lobectomy for refractory epilepsy with new L BG infarct noted on post-op MRI    -epilepsy, NSGY following  -vascular neuro consulted today  -cont home topamax and keppra   -post op CTH with expected post op changes, nneumocephalus noted anteriorly bilaterally  -post op MRI with new L BG infarct   -SBP <140, prn hydralazine and labetalol  -dex 4 q6h + H2B  -SG HV drain in place with cefazolin ppx, management per NSGY  -q1h neuro, vital checks  -EuNa, EuGly  -PT/OT/SLP as appropriate  -hold AC/AP in acute post op setting      Derm  DRESS syndrome  2016    Cardiac/Vascular  Essential hypertension  SBP <140 post op  No longer requiring cardene  labetalol, hydralazine prn    Endocrine  History of prediabetes  A1C 5.6  SSI and accu checks  Consistent carb diet    Morbid obesity  Nutrition consult    Other  Acute stroke of basal ganglia  1/31 event: d/t to somnolence, not moving R side, and L fixed gaze, another 1g of keppra (2g total) was given as well as 4mg dex IV, MRI STAT was performed showing acute infarction involving the left lentiform nucleus. Unfortunately not a candidate for tPA d/t surgery, likely iatrogenic in nature. Upon return from MRI, pt WD R side, still drowsy but not requiring sternal rub. GCS 10, still not gagging on oral airway  left in place.  -consider liberalizing BP goal to <160 in light of CVA  -NS @75, avoid hypotension  -q1h neuro checks  -ABG ,O2 sat stable on room air          The patient is being Prophylaxed for:  Venous Thromboembolism with: Mechanical  Stress Ulcer with: H2B  Ventilator Pneumonia with: none    Activity Orders          Turn patient every 2 hours starting at 02/01 1000    Diet Adult Regular (IDDSI Level 7) [facility]; Consistent Carbohydrate: Regular starting at 01/31 2125    Bed rest starting at 01/31 1946        No Order     Level III    Ingrid Arnold PANicholeC  Neurocritical Care  [facility] - Neuro Critical Care

## 2022-02-01 NOTE — PLAN OF CARE
[facility] Care Plan    POC reviewed with Carolyn Mccullough and mother at 0300. Questions and concerns addressed. Pt brought up from OR at 2010. Neuro concerns of responsiveness and R sided deficit reported to XENIA Lopez-- CT and STAT MRI completed. Blood pressure monitored overnight, systolic goal <140 maintained. 2 amps bicarb ordered per PA and administered, ABG redraw pending. Bridges removed. Hemovac total of 110cc output overnight. Midline consulted due to poor access. Normal Saline running at 75mL/hr. Will continue to monitor. See below and flowsheets for full assessment and VS info.       Neuro:  [place] Coma Scale  Best Eye Response: 3-->(E3) to speech  Best Motor Response: 6-->(M6) obeys commands  Best Verbal Response: 1-->(V1) none  [place] Coma Scale Score: 10        24hr Temp:  [96.6 °F (35.9 °C)-98 °F (36.7 °C)]     CV:   Rhythm: normal sinus rhythm  BP goals:   SBP < 140  MAP > 65    Resp:   O2 Device (Oxygen Therapy): room air       Plan: N/A    GI/:     Diet/Nutrition Received: NPO  Last Bowel Movement: 01/30/22  Voiding Characteristics: urethral catheter (bladder)    Intake/Output Summary (Last 24 hours) at 2/1/2022 0537  Last data filed at 2/1/2022 0501  Gross per 24 hour   Intake 4399.51 ml   Output 2630 ml   Net 1769.51 ml          Labs/Accuchecks:  Recent Labs   Lab 02/01/22  0345   WBC 13.02*   RBC 4.27   HGB 11.7*   HCT 36.5*         Recent Labs   Lab 02/01/22  0345      K 3.8   CO2 15*   *   BUN 7   CREATININE 0.7   ALKPHOS 63   ALT 9*   AST 23   BILITOT 0.1    No results for input(s): PROTIME, INR, APTT, HEPANTIXA in the last 168 hours. No results for input(s): CPK, CPKMB, TROPONINI, MB in the last 168 hours.    Electrolytes: Electrolytes replaced  Accuchecks: ACHS    Gtts:   sodium chloride 0.9% 75 mL/hr at 02/01/22 0501       LDA/Wounds:  Lines/Drains/Airways       Drain                   Closed/Suction Drain 01/31/22 1842 Left Other (Comment) Accordion 10 Fr. <1 day               Arterial Line              Arterial Line 22 1308 Left Radial <1 day              Peripheral Intravenous Line                   Peripheral IV - Single Lumen 20 G Left Antecubital -- days                  Wounds: No  Wound care consulted: No     Admit Date: 2022    Localization-related (focal) (partial) symptomatic epilepsy and epileptic syndromes with complex partial seizures, intractable, without status epilepticus    Past Medical History:   Diagnosis Date    Seizures        Past Surgical History:   Procedure Laterality Date    BRAIN SURGERY      sEEG left [facility]     SECTION  2010    TUBAL LIGATION      Robbin 4 years ago       Individualization:   1. Pt likes dim lighting   2. Pt likes a lot of blankets     Restraints:           Problem: Adult Inpatient Plan of Care  Goal: Plan of Care Review  Outcome: Ongoing, Progressing  Goal: Patient-Specific Goal (Individualized)  Outcome: Ongoing, Progressing     Problem: Infection  Goal: Absence of Infection Signs and Symptoms  Outcome: Ongoing, Progressing

## 2022-02-01 NOTE — PT/OT/SLP EVAL
"Speech Language Pathology Evaluation  Bedside Swallow    Patient Name:  Carolyn Mccullough   MRN:  [id number]  Admitting Diagnosis: Localization-related (focal) (partial) symptomatic epilepsy and epileptic syndromes with complex partial seizures, intractable, without status epilepticus    Recommendations:                 General Recommendations:  Dysphagia therapy and Speech language evaluation  Diet recommendations:  NPO, NPO   Aspiration Precautions: Strict aspiration precautions   General Precautions: Standard, aspiration,fall,NPO  Communication strategies:  none    History:     Past Medical History:   Diagnosis Date    Seizures        Past Surgical History:   Procedure Laterality Date    BRAIN SURGERY      sEEG left [facility]     SECTION  2010    TUBAL LIGATION      Robbin 4 years ago       Social History: Patient lives with mother.      Prior diet: regular with thin        Subjective     "When will her speech come back?"per mother  Patient goals: ronaldo    Pain/Comfort:  · Pain Rating 1: 0/10  · Pain Rating Post-Intervention 1: 0/10    Respiratory Status: Room air    Objective:     Oral Musculature Evaluation  · Oral Musculature: gross asymmetry present,right weakness  · Dentition: present and adequate  · Secretion Management: oral holding  · Mucosal Quality: good  · Mandibular Strength and Mobility: impaired  · Oral Labial Strength and Mobility: impaired pursing,impaired retraction,impaired seal  · Lingual Strength and Mobility: impaired strength  · Velar Elevation: impaired  · Buccal Strength and Mobility: impaired  · Volitional Cough: not elicited  · Volitional Swallow: not elicited  · Voice Prior to PO Intake: low intensity    Bedside Swallow Eval:   Consistencies Assessed:  · Thin liquids 1/4 teaspoon water     Oral Phase:   · Decreased closure around utensil    Pharyngeal Phase:   · coughing/choking    Compensatory Strategies  · None    Treatment: edcucation provided re role of " slp, rec for npo and aspiration precautions to mother and nursing.    Assessment:     Carolyn Mccullough is a 31 y.o. female with an SLP diagnosis of Aphasia, Dysphagia and Dysarthria.    Goals:   Multidisciplinary Problems     SLP Goals        Problem: SLP Goal    Goal Priority Disciplines Outcome   SLP Goal     SLP Ongoing, Progressing   Description: Goals due 2/8  1.  Pt. Will participate in ongoing assessment of swallow  2.  Pt. Will participate in speech language evaluation                   Plan:     · Patient to be seen:  4 x/week   · Plan of Care expires:  03/01/22  · Plan of Care reviewed with:  patient,mother   · SLP Follow-Up:  Yes       Discharge recommendations:  rehabilitation facility   Barriers to Discharge:  Level of Skilled Assistance Needed 24 hour care    Time Tracking:     SLP Treatment Date:   02/01/22  Speech Start Time:  1023  Speech Stop Time:  1040     Speech Total Time (min):  17 min    Billable Minutes: Eval Swallow and Oral Function 8 and Self Care/Home Management Training 9    02/01/2022

## 2022-02-01 NOTE — CONSULTS
[facility] - Neuro Critical Care  Vascular Neurology  Comprehensive Stroke Center  Consult Note    Inpatient consult to Vascular (Stroke) Neurology  Consult performed by: Shaq Simpson PA-C  Consult ordered by: Ingrid Arnold PA-C        Assessment/Plan:     Patient is a 31 y.o. year old female with:    * Localization-related (focal) (partial) symptomatic epilepsy and epileptic syndromes with complex partial seizures, intractable, without status epilepticus  S/p craniotomy and lobectomy (Left temporal)   Currently on Keppra 1g BID and Topamax 200mg BID     Thrombotic stroke involving left middle cerebral artery  Patient is a 31 year old female with PMH of epilepsy. She was admitted to [facility] for s/p L craniotomy with L temporal lobectomy for intractable epilepsy. Following procedure, patient became very drowsy and had developed RSW with a fixed L gaze. Keppra and dexamethasone given by primary team. MRI was then obtained which showed a L BG infarct. Stroke team was consulted for further recommendations. Patient anarthric on exam. Comprehension appears to be grossly intact. R side hemiplegia noted. Etiology unclear at this time.     Recommend getting CTA H/N for vessel imaging. Consider addition of SSRI, would check with epilepsy team prior to initiation.     Antithrombotics for secondary stroke prevention: Antiplatelets: Aspirin: 81 mg daily    Statins for secondary stroke prevention and hyperlipidemia, if present:   Statins: Atorvastatin- 40 mg daily    Aggressive risk factor modification: Obesity     Rehab efforts: The patient has been evaluated by a stroke team provider and the therapy needs have been fully considered based off the presenting complaints and exam findings. The following therapy evaluations are needed: PT evaluate and treat, OT evaluate and treat, SLP evaluate and treat, PM&R evaluate for appropriate placement    Diagnostics ordered/pending: CTA Head to assess vasculature , CTA Neck/Arch to assess  vasculature    VTE prophylaxis: Heparin 5000 units SQ every 8 hours  Mechanical prophylaxis: Place SCDs    BP parameters: Infarct: No intervention, SBP <220        Essential hypertension  Stroke RF   SBP < 220 for acute infarct without intervention   Avoid hypotension     Morbid obesity  Stroke RF    on diet and exercise when appropriate      STROKE DOCUMENTATION          NIH Scale:  1a. Level of Consciousness: 0-->Alert, keenly responsive  1b. LOC Questions: 2-->Answers neither question correctly  1c. LOC Commands: 0-->Performs both tasks correctly  2. Best Gaze: 0-->Normal  3. Visual: 0-->No visual loss  4. Facial Palsy: 1-->Minor paralysis (flattened nasolabial fold, asymmetry on smiling)  5a. Motor Arm, Left: 0-->No drift, limb holds 90 (or 45) degrees for full 10 secs  5b. Motor Arm, Right: 4-->No movement  6a. Motor Leg, Left: 0-->No drift, leg holds 30 degree position for full 5 secs  6b. Motor Leg, Right: 4-->No movement  7. Limb Ataxia: 0-->Absent  8. Sensory: 0-->Normal, no sensory loss  9. Best Language: 0-->No aphasia, normal  10. Dysarthria: 2-->Severe dysarthria, patients speech is so slurred as to be unintelligible in the absence of or out of proportion to any dysphasia, or is mute/anarthric  11. Extinction and Inattention (formerly Neglect): 0-->No abnormality  Total (NIH Stroke Scale): 13    Modified [place] Score: 0  [place] Coma Scale:    ABCD2 Score:    [id number] Score:   HAS -BLED Score:   ICH Score:   Hunt & Solomon Classification:       Thrombolysis Candidate? No, Intracranial or spinal surgery (<3  months)    Delays to Thrombolysis?  Not Applicable    Interventional Revascularization Candidate?   Is the patient eligible for mechanical endovascular reperfusion (NIKHIL)?  No; at this time symptoms not suggestive of large vessel occlusion    Delays to Thrombectomy? Not Applicable    Hemorrhagic change of an Ischemic Stroke: Does this patient have an ischemic stroke with hemorrhagic changes?  No     Subjective:     History of Present Illness:  Ms. Mccullough is a 31 year old female with PMH of epilepsy. She was admitted to [facility] for s/p L craniotomy with L temporal lobectomy for intractable epilepsy. Following procedure, patient became very drowsy and had developed RSW with a fixed L gaze. Keppra and dexamethasone given by primary team. MRI was then obtained which showed a L BG infarct. Stroke team was consulted for further recommendations. Patient anarthric on exam. Comprehension appears to be grossly intact. R side hemiplegia noted.           Past Medical History:   Diagnosis Date    Seizures      Past Surgical History:   Procedure Laterality Date    BRAIN SURGERY      sEEG left [facility]     SECTION  2010    SURGICAL REMOVAL OF LOBE OF BRAIN Left 2022    Procedure: LEFT CRANIOTOMY FOR TEMPORAL LOBECTOMY WITH BRAINLAB;  Surgeon: Mayra Iraheta MD;  Location: [facility] OR [address] [place] [address];  Service: Neurosurgery;  Laterality: Left;  TORONTO III, ASA III, BLOOD TYPE &CROSS 2UNITS, HEADREST LUCIANO, SUPINE POSITION, BRAINLAB, REGULAR BED    TUBAL LIGATION      Robbin 4 years ago     Family History   Problem Relation Age of Onset    Diabetes Mellitus Mother     Diabetes Mellitus Brother      Social History     Tobacco Use    Smoking status: Never Smoker    Smokeless tobacco: Never Used   Substance Use Topics    Alcohol use: No     Comment: one drink in a few months time     Drug use: Never     Review of patient's allergies indicates:   Allergen Reactions    Carbamazepine Rash    Iodinated contrast media Dermatitis    Escitalopram Rash       Medications: I have reviewed the current medication administration record.    Medications Prior to Admission   Medication Sig Dispense Refill Last Dose    levETIRAcetam (KEPPRA) 1000 MG tablet Take 1,000 mg by mouth 2 (two) times daily.   2022 at 0500    topiramate (TOPAMAX) 200 MG Tab Take 200 mg by mouth 2 (two) times daily.    1/31/2022 at 0500    acetaminophen (TYLENOL) 500 MG tablet Take 500 mg by mouth daily as needed for Pain.   More than a month at Unknown time    aspirin/salicylamide/caffeine (BC HEADACHE POWDER ORAL) Take by mouth as needed. Headache   More than a month at Unknown time    ibuprofen (ADVIL,MOTRIN) 800 MG tablet Take 800 mg by mouth 3 (three) times daily as needed.   More than a month at Unknown time    midazolam (NAYZILAM) 5 mg/spray (0.1 mL) Spry 1 spray by Nasal route every 10 (ten) minutes as needed (cluster seizures). Please administer 1 spray after seizures lasting more than 3 minutes or more that 3 seizures in a 24 hour period, may administer a second dose after 10 min if there is no response to the first dose. Use for treatment of no more than 1 episode (1-2 sprays) every three days, no more than 5 episodes (1-2 sprays) in a month 4 each 3        Review of Systems   Unable to perform ROS: Patient nonverbal   Constitutional: Negative for fever.   HENT: Negative for drooling, hearing loss and rhinorrhea.    Respiratory: Negative for cough.    Gastrointestinal: Negative for diarrhea and vomiting.   Neurological: Positive for facial asymmetry, speech difficulty and weakness.   Psychiatric/Behavioral: Negative for agitation and behavioral problems. The patient is not nervous/anxious.      Objective:     Vital Signs (Most Recent):  Temp: 98 °F (36.7 °C) (02/01/22 0301)  Pulse: 71 (02/01/22 1020)  Resp: 18 (02/01/22 1020)  BP: 126/64 (02/01/22 0701)  SpO2: 100 % (02/01/22 1020)    Vital Signs Range (Last 24H):  Temp:  [96.6 °F (35.9 °C)-98 °F (36.7 °C)]   Pulse:  [59-78]   Resp:  [15-23]   BP: (111-129)/(59-70)   SpO2:  [97 %-100 %]   Arterial Line BP: (105-142)/(48-73)     Physical Exam  Vitals and nursing note reviewed.   Constitutional:       Appearance: She is obese. She is not diaphoretic.   HENT:      Head:      Comments: S/p craniotomy      Right Ear: External ear normal.      Left Ear: External ear  normal.      Nose: No rhinorrhea.   Eyes:      General: No scleral icterus.        Right eye: No discharge.         Left eye: No discharge.      Extraocular Movements: Extraocular movements intact.   Cardiovascular:      Rate and Rhythm: Normal rate.   Pulmonary:      Effort: Pulmonary effort is normal. No respiratory distress.   Abdominal:      General: There is no distension.   Musculoskeletal:         General: No deformity or signs of injury.   Skin:     General: Skin is warm and dry.   Neurological:      Mental Status: She is alert.      Motor: Weakness present.      Comments: Anarthric   RSW            Neurological Exam:   LOC: alert  Attention Span: Good   Language: Mute  Articulation: Mute/Anarthric  Orientation: Untestable due to severe dysarthria   Visual Fields:   EOM (CN III, IV, VI): Full/intact  Facial Movement (CN VII): R facial droop   Motor: 0/5 in RUE and RLE         Laboratory:  CMP:   Recent Labs   Lab 02/01/22  0345   CALCIUM 7.9*   ALBUMIN 3.1*   PROT 6.8      K 3.8   CO2 15*   *   BUN 7   CREATININE 0.7   ALKPHOS 63   ALT 9*   AST 23   BILITOT 0.1     CBC:   Recent Labs   Lab 02/01/22  0345   WBC 13.02*   RBC 4.27   HGB 11.7*   HCT 36.5*      MCV 86   MCH 27.4   MCHC 32.1     Lipid Panel: No results for input(s): CHOL, LDLCALC, HDL, TRIG in the last 168 hours.  Coagulation: No results for input(s): PT, INR, APTT in the last 168 hours.  Hgb A1C:   Recent Labs   Lab 01/27/22  1241   HGBA1C 5.7*     TSH: No results for input(s): TSH in the last 168 hours.    Diagnostic Results:      Brain imaging:  MRI Brain without contrast 1/31/22     1. Acute infarction involving the left lentiform nucleus.  2. Postoperative changes of left craniectomy with anterior temporal lobectomy with expected pneumocephalus and susceptibility artifacts in the operative bed.  3. Sinus disease.    CTH 1/31/22   Status post recent left temporal lobectomy with postoperative changes    Vessel  Imaging:  Recommending CTA H/N (pending)     Cardiac Evaluation:   EKG 2/1/22   Sinus rhythm with marked sinus arrythmia   Otherwise normal ECG   Vent. Rate : 066 BPM     Atrial Rate : 066 BPM      P-R Int : 144 ms          QRS Dur : 074 ms       QT Int : 438 ms       P-R-T Axes : 049 063 036 degrees      QTc Int : 459 ms           Shaq Simpson PA-C  [facility]  Department of Vascular Neurology   [facility] - Neuro Critical Care

## 2022-02-01 NOTE — HPI
Ms. 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 Patient was referred to [facility] for surgical evaluation by Brenda Garcia completed prior to referral with onset in the left mesial temporal structures however broad zone of abnormal activity extending across left temporal lobe structures. Neuropsychology testing completed which lateralized dominant hemisphere to the left and noted signifciant baseline deficits. Repeat MRI brain at [facility] was notable for possible small encephalocele. Patient was presented at multidisciplinary Epilepsy surgery conference, and after discussion with family patient elected to proceed with left anterior temporal lobectomy, completed 1/31/22. Post-operatively, patient noted to have minimal movement to RUE/RLE, sent for MRI brain which showed acute infarct involving the left lentiform nucleus. Neurology Epilepsy following for assistance in AED management.     Dr. Vasquez Clinic Visit  HPI 1/10/2022:      Age of first seizure: 2011 (19yo)  Seizure Risk Factors: no family history of seizures, no CNS infections, No childhood seizures, no head strike with LOC, term repeat C section with no issue and no prolonged hospitalization   Time of Last Seizure: 12/18/2021   # of lifetime Seizures: 700?  Frequency of Seizures: at most 5 in one day, at the worst, daily, multiple a day. Currently, 2-4 per month.  Seizure Triggers: used to be in sleep, now can be any time of day   Injuries/Hospitalization for seizures? Right hand sprain, right hand burn, ED with hurt hand and cluster seizures, tip/side tongue bite, side of cheek bite    Pregnancy?  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  Tubal ligation 2016  Folic Acid? no  Bone Health: no dexa      Auras: not always with a warning,  may have a sensation that a seizure is going to happen consisting of having sensation of seeing actions before they occur; sometimes will be standing and just fall out with no warning       Seizure Events:   1. Oral automatisms (grimacing, teeth grinding, hypersalivation), staring, eyes rolling, which may progress into generalized convulsion with tongue bite; when from sleep associated with an ictal cry       Current AED/SEs:  1. Topiramate 200 mg twice daily SE none    2. Levetiracetam 1000 mg twice daily SE none       Previous AED/SEs or reason for DC.   1. Valproic acid  2. Brivaracetam 50 mg twice daily -> hallucinations  3. Vimpat   4. Carbamazepine     06/19/2020 Neuropsychological evaluation: Carolyn presented with diffuse cognitive impairment including deficits in problem solving, reasoning, and comprehension.  These skills were below a 2018 evaluation and her preillness functioning.  Current testing resulted in her difficulties with activities of daily living suggests impaired intellectual abilities.  Carolyn also exhibits deficits in nonlateralizing cognitive skills including working memory and processing speed.  In contrast, Carolyn showed deficits in word finding, comprehension of language, and use of grammar.  These difficulty suggests greater left hemisphere involvement, particularly Broca's area.  Learning and memory information has improved from the 2018 assessment and her verbal and visual abilities were similarly developed.  For instance, she exhibited an intact ability to learn and retain rote, simplified information but these abilities declined when information increased in complexity.  This pattern suggests that working memory deficits limit her ability to learn lengthier information.  Visual motor skills were relatively strong although impairments were noted in visual spatial judgment and fine motor coordination and speed.  Finally this evaluation did not find emotional or behavioral concerns  which suggest improvements over time.     sEEG  tuzksf62/29/2020-removed 11/01/2020, discharged 11/05/2020:  Leads placed in 1. Left amygdala (AMG), left hippocampus (HIP), left superior temporal gyrus (STEMP), left superior temporal gyrus2 (STEMP2), left inferior temporal (INTEMP), left posterior temporal (PTEMP) left posterior temporal (PTEMP2).  Interictal epileptiform activity: 1. There is near continuous delta slowing with superimposed fast activity and embedded epileptiform spikes noted over hippocampal area (HIP1,2). 2. There are long runs of sharp waves intermixed with delta slowing noted over the amygdala (AMG 1,2).  Frequent spike waves maximal over superior temporal leads (STEMP1) electrode contacts 2-5.  This activity becomes more rhythmic and periodic in that latter half of the recording.4.  There are intermittent short runs of spike and slow wave noted over inferior temporal area (INTEMP 2-3).  New discharges appeared over PTEMP2 1-3, which were frequent in appearance and often seen in bursts or runs of activity.  Interpretation:  These findings are suggestive of left-sided epileptogenic cerebral dysfunction noted broadly within the left hippocampus and posterior temporal regions as evidence by frequent interictal activity over these cortical regions as well as seizures that localized to these areas.  The attenuation of interictal activity over the HIP 1-2 leads several seconds before clinical and more obvious electrographic onset, could indicate early involvement of that region in the seizure onset.  However at the DC shift noted over the PTEMP1 leads could also indicate an area of epileptogenic onset.    Based on the entirety of this 4 day intracranial EEG monitoring in the multitude of seizures captured, it is hypothesized that the ictal onset zone lays in close proximity to HIP1-2 and PTEMP 1-4.  This patient was discussed with an epilepsy surgical conference, and the decision was made to offer the  patient a left anterior temporal lobectomy with amygdalohippocampectomy.     Leads removed on 11/01/2020 after patient pulled out 4 of 7 needs during an episode of postictal confusion.  She did develop fevers, and a workup was performed including CXR, blood in urine cultures, and a CT Head w/wo contrast.  These were overall unrevealing.  She was started on empiric vancomycin and cefepime by the PICU for her fevers, which subsequently resolved.  Wound Care was consulted for a left back fold wound and wound care was initiated.  Her incision sites from her leads remain c/d/i, with no evidence of leak.  She remained afebrile for 24 hours off antibiotics, and was deemed stable for discharge home.  On 11/04/2020, new and worsening bilateral lower extremity and patchy lower upper extremity redness and swelling without a discrete rash concerning for possible cellulitis.  Dermatology and PICU were consulted, and they were concerned for possible DRESS syndrome, although she has been on her current AEDs for some time.  Given the complexity of her care and need for further allergy and immunology evaluation, the decision was made to transfer her to an adult hospital for further management of her medical issues.  At this point, there are no further neurosurgical needs in she requires medical management of her bilateral lower extremity edema and erythema.  She was transferred to the hospitalist service at [facility].     MRI brain w/ 10/22/2020 and- [facility]/brainlab there is mild parietal predominant cerebral atrophy, moderate diffuse cerebellar atrophy and partially empty sella with pituitary flattened along the floor of the sella.  No additional significant MR abnormality or abnormal enhancement of the intracranial contents is evident.  The ventricles are normal in size and configuration.  No acute intracranial hemorrhage, extra-axial collection, midline shift or herniation is evident.  The paranasal sinuses and mastoid air cells are  clear.  The orbits are intact.  There is mild calvarial thickening.  Impression:  Cerebral and cerebellar atrophy with calvarial thickening compatible with chronic sequelae of epilepsy and antiepileptic medications.  No acute intracranial abnormality.     Functional brain MRI:  Functional imaging demonstrated left hemispheric dominance for language     [facility]:  03/17/2020-03/18/2020; 8-9 hz PDR, persistent low-voltage delta and theta slowing was appreciated broadly over the left temporal region.  At times, this slowing appears rhythmic in nature (TIRDA).  Epileptiform spikes and sharp waves were also appreciated over the left temporal region maximally seen in the T3/T5 leads.  Rarely, volume conduction of these discharges were noted to involve the right parasagittal region.  These discharges were noted to wax and wane in frequency, most notably seen in drowsiness and sleep.  No seizures captured.  Interictal SPECT scan performed 03/17/2020.  Impression:  This is an abnormal 21 hour prolonged EEG with accompanying video monitoring in wakefulness, drowsiness, and sleep due to the following features: 1. Persistent slowing over the left temporal region, frequently noted to be rhythmic in nature (TIRDA), 2. Epileptiform discharges noted maximally over the left mid-temporal region, seen most frequently in drowsiness and sleep.  Interpretation:  These findings are consistent with a focal area of potentially epileptogenic cerebral dysfunction noted broadly over the left mid-temporal region.  There were no seizures noted during this day in the EEG recording.  The patient did have a seizure on 03/16/2020.  Interictal SPECT scan radionucleotide injection was performed during this day of the EEG recording.  Compared to the previous day's study, the slowing noted over the left hemisphere has noted to become more pronounced and persistent.     PET scan 08/14/2019 ([place]):  Unremarkable metabolic brain  study in     [facility] 06/23/2020:  Left hemispheric language and memory lateralization (anesthetic agent used: propofol) -> the results of the intracarotid diprivan (modified [facility]) test are difficult to interpret considering prolonged generalized sedating propofol (diprivan) effects as compared to previous experience with amobarbital.  Interpretable portions of the test showed left hemispheric language and memory lateralization.     MRI brain 06/17/2019:  Persistent atrophy of bilateral parietal occipital lobes, unchanged; increased frontotemporal and cerebellar atrophy compared to previous studies; no acute intracranial abnormality     [facility] 07/16/2018-07/20/2018:  Two left temporal focal onset seizures with alteration of awareness, abundant focal left mid temporal interictal epileptiform activity, less frequent independent right temporal interictal epileptiform activity     EEG 06/09/2016 - normal     MRI brain 06/09/2016 w w/o small left frontal vascular finding consistent with benign venous developmental abnormality.  Benign empty sella.      Other Allergies:  Carbamazepine -rash, escitalopram -rash     AED compliance, adherence: no missed doses

## 2022-02-01 NOTE — PLAN OF CARE
[facility] - Neuro Critical Care  Initial Discharge Assessment       Primary Care Provider: Fadumo Laughlin MD    Admission Diagnosis: Localization-related (focal) (partial) symptomatic epilepsy and epileptic syndromes with complex partial seizures, intractable, with status epilepticus [G40.211]  Epilepsy [G40.909]    Admission Date: 1/31/2022  Expected Discharge Date: 2/9/2022    Discharge Barriers Identified: None    Payor: MEDICAID / Plan: [facility] COMMUNITY PLAN [facility] [employer] ([place] MEDICAID) / Product Type: Managed Medicaid /     Extended Emergency Contact Information  Primary Emergency Contact: Karina Mccullough  Address: [address]           [address]           [place], [place] [address] [place] [address]  Mobile Phone: [phone]  Relation: Mother    Discharge Plan A: Home with family  Discharge Plan B: Rehab      [id number] - [place], [place] - [address] AT [facility] OF [address] [address]  [address]  [place] [address]  Phone: [phone] Fax: [phone]      Transferred from:   Home    Past Medical History:   Diagnosis Date    Seizures          CM met with patient and Karina Mccullough (mother) [phone] in room for Discharge Planning Assessment.  Patient is unable to answer questions.  Per mother, the patient lives with mother and minor child in a first floor apartment with 0 step(s) to enter.   Per mother, the patient needs supervision with ADLS and cooking and uses no dme for ambulation.  Patient will have assistance from her mother and aunt upon discharge.   Discharge Planning Booklet given to patient/family and discussed.  All questions addressed.  CM will follow for needs.      Initial Assessment (most recent)     Adult Discharge Assessment - 02/01/22 1612        Discharge Assessment    Assessment Type Discharge Planning Assessment     Confirmed/corrected address, phone number and insurance Yes     Confirmed Demographics Correct on Facesheet     Source of Information family      Communicated LADI with patient/caregiver Date not available/Unable to determine     Reason For Admission epileptic syndromes with complex partial seizures, intractable, without status epilepticus     Lives With parent(s);child(fredo), dependent     Facility Arrived From: home     Do you expect to return to your current living situation? Yes     Do you have help at home or someone to help you manage your care at home? Yes     Who are your caregiver(s) and their phone number(s)? Karina Mccullough (mother) [phone]     Prior to hospitilization cognitive status: Alert/Oriented     Current cognitive status: Unable to Assess     Walking or Climbing Stairs Difficulty none     Dressing/Bathing Difficulty dressing difficulty, assistance 1 person;bathing difficulty, assistance 1 person     Dressing/Bathing Management mother stated that patient needs supervision     Home Accessibility stairs to enter home     Number of Stairs, Main Entrance none     Home Layout Able to live on 1st floor     Equipment Currently Used at Home none     Readmission within 30 days? No     Patient currently being followed by outpatient case management? No     Do you currently have service(s) that help you manage your care at home? No     Do you take prescription medications? Yes     Do you have prescription coverage? Yes     Coverage Medicaid     Do you have any problems affording any of your prescribed medications? No     Is the patient taking medications as prescribed? yes     Who is going to help you get home at discharge? Karina Mccullough (mother) [phone]     How do you get to doctors appointments? family or friend will provide     Are you on dialysis? No     Do you take coumadin? No     Discharge Plan A Home with family     Discharge Plan B Rehab     DME Needed Upon Discharge  other (see comments)   tbd    Discharge Plan discussed with: Parent(s)     Name(s) and Number(s) Karina Mccullough (mother) [phone]     Discharge  Barriers Identified None        Relationship/Environment    Name(s) of Who Lives With Patient Karina Mccullough (mother) [phone]               Sahara Coburn RN, CCRN-K, [facility]  Neuro-Critical Care   X [phone]

## 2022-02-01 NOTE — HPI
Ms. Mccullough is a 31 year old female with PMH of epilepsy. She was admitted to [facility] for s/p L craniotomy with L temporal lobectomy for intractable epilepsy. Following procedure, patient became very drowsy and had developed RSW with a fixed L gaze. Keppra and dexamethasone given by primary team. MRI was then obtained which showed a L BG infarct. Stroke team was consulted for further recommendations. Patient anarthric on exam. Comprehension appears to be grossly intact. R side hemiplegia noted.

## 2022-02-02 PROBLEM — I63.312 THROMBOTIC STROKE INVOLVING LEFT MIDDLE CEREBRAL ARTERY: Status: RESOLVED | Noted: 2022-02-01 | Resolved: 2022-02-02

## 2022-02-02 PROBLEM — I63.9 ACUTE CEREBROVASCULAR ACCIDENT (CVA) OF BASAL GANGLIA: Status: ACTIVE | Noted: 2022-02-02

## 2022-02-02 LAB
ALBUMIN SERPL BCP-MCNC: 3 G/DL (ref 3.5–5.2)
ALP SERPL-CCNC: 60 U/L (ref 55–135)
ALT SERPL W/O P-5'-P-CCNC: 9 U/L (ref 10–44)
ANION GAP SERPL CALC-SCNC: 10 MMOL/L (ref 8–16)
AST SERPL-CCNC: 20 U/L (ref 10–40)
BASOPHILS # BLD AUTO: 0.01 K/UL (ref 0–0.2)
BASOPHILS NFR BLD: 0.1 % (ref 0–1.9)
BILIRUB SERPL-MCNC: 0.2 MG/DL (ref 0.1–1)
BUN SERPL-MCNC: 7 MG/DL (ref 6–20)
CALCIUM SERPL-MCNC: 7.9 MG/DL (ref 8.7–10.5)
CHLORIDE SERPL-SCNC: 115 MMOL/L (ref 95–110)
CO2 SERPL-SCNC: 14 MMOL/L (ref 23–29)
CREAT SERPL-MCNC: 0.6 MG/DL (ref 0.5–1.4)
DIFFERENTIAL METHOD: ABNORMAL
EOSINOPHIL # BLD AUTO: 0 K/UL (ref 0–0.5)
EOSINOPHIL NFR BLD: 0 % (ref 0–8)
ERYTHROCYTE [DISTWIDTH] IN BLOOD BY AUTOMATED COUNT: 15.1 % (ref 11.5–14.5)
EST. GFR  (AFRICAN AMERICAN): >60 ML/MIN/1.73 M^2
EST. GFR  (NON AFRICAN AMERICAN): >60 ML/MIN/1.73 M^2
GLUCOSE SERPL-MCNC: 95 MG/DL (ref 70–110)
HCT VFR BLD AUTO: 33.8 % (ref 37–48.5)
HGB BLD-MCNC: 10.7 G/DL (ref 12–16)
IMM GRANULOCYTES # BLD AUTO: 0.05 K/UL (ref 0–0.04)
IMM GRANULOCYTES NFR BLD AUTO: 0.4 % (ref 0–0.5)
LYMPHOCYTES # BLD AUTO: 1.4 K/UL (ref 1–4.8)
LYMPHOCYTES NFR BLD: 10.9 % (ref 18–48)
MAGNESIUM SERPL-MCNC: 2.1 MG/DL (ref 1.6–2.6)
MCH RBC QN AUTO: 27.2 PG (ref 27–31)
MCHC RBC AUTO-ENTMCNC: 31.7 G/DL (ref 32–36)
MCV RBC AUTO: 86 FL (ref 82–98)
MONOCYTES # BLD AUTO: 0.8 K/UL (ref 0.3–1)
MONOCYTES NFR BLD: 6.3 % (ref 4–15)
NEUTROPHILS # BLD AUTO: 10.9 K/UL (ref 1.8–7.7)
NEUTROPHILS NFR BLD: 82.3 % (ref 38–73)
NRBC BLD-RTO: 0 /100 WBC
PHOSPHATE SERPL-MCNC: 2.2 MG/DL (ref 2.7–4.5)
PLATELET # BLD AUTO: 214 K/UL (ref 150–450)
PMV BLD AUTO: 11.1 FL (ref 9.2–12.9)
POTASSIUM SERPL-SCNC: 3.9 MMOL/L (ref 3.5–5.1)
PROT SERPL-MCNC: 6.1 G/DL (ref 6–8.4)
RBC # BLD AUTO: 3.93 M/UL (ref 4–5.4)
SODIUM SERPL-SCNC: 139 MMOL/L (ref 136–145)
WBC # BLD AUTO: 13.18 K/UL (ref 3.9–12.7)

## 2022-02-02 PROCEDURE — 85025 COMPLETE CBC W/AUTO DIFF WBC: CPT | Performed by: PHYSICIAN ASSISTANT

## 2022-02-02 PROCEDURE — 99233 SBSQ HOSP IP/OBS HIGH 50: CPT | Mod: ,,, | Performed by: PSYCHIATRY & NEUROLOGY

## 2022-02-02 PROCEDURE — 97530 THERAPEUTIC ACTIVITIES: CPT

## 2022-02-02 PROCEDURE — 99233 PR SUBSEQUENT HOSPITAL CARE,LEVL III: ICD-10-PCS | Mod: ,,,

## 2022-02-02 PROCEDURE — 25000003 PHARM REV CODE 250: Performed by: PSYCHIATRY & NEUROLOGY

## 2022-02-02 PROCEDURE — 83735 ASSAY OF MAGNESIUM: CPT | Performed by: PSYCHIATRY & NEUROLOGY

## 2022-02-02 PROCEDURE — 92523 SPEECH SOUND LANG COMPREHEN: CPT

## 2022-02-02 PROCEDURE — 51798 US URINE CAPACITY MEASURE: CPT

## 2022-02-02 PROCEDURE — 63600175 PHARM REV CODE 636 W HCPCS: Performed by: STUDENT IN AN ORGANIZED HEALTH CARE EDUCATION/TRAINING PROGRAM

## 2022-02-02 PROCEDURE — 97162 PT EVAL MOD COMPLEX 30 MIN: CPT

## 2022-02-02 PROCEDURE — 25000003 PHARM REV CODE 250: Performed by: STUDENT IN AN ORGANIZED HEALTH CARE EDUCATION/TRAINING PROGRAM

## 2022-02-02 PROCEDURE — 99233 SBSQ HOSP IP/OBS HIGH 50: CPT | Mod: ,,,

## 2022-02-02 PROCEDURE — 84100 ASSAY OF PHOSPHORUS: CPT | Performed by: PSYCHIATRY & NEUROLOGY

## 2022-02-02 PROCEDURE — 97112 NEUROMUSCULAR REEDUCATION: CPT

## 2022-02-02 PROCEDURE — 92526 ORAL FUNCTION THERAPY: CPT

## 2022-02-02 PROCEDURE — 97165 OT EVAL LOW COMPLEX 30 MIN: CPT

## 2022-02-02 PROCEDURE — 63600175 PHARM REV CODE 636 W HCPCS: Performed by: PHYSICIAN ASSISTANT

## 2022-02-02 PROCEDURE — 80053 COMPREHEN METABOLIC PANEL: CPT | Performed by: PHYSICIAN ASSISTANT

## 2022-02-02 PROCEDURE — 99233 PR SUBSEQUENT HOSPITAL CARE,LEVL III: ICD-10-PCS | Mod: ,,, | Performed by: PSYCHIATRY & NEUROLOGY

## 2022-02-02 PROCEDURE — 25000003 PHARM REV CODE 250

## 2022-02-02 PROCEDURE — 97166 OT EVAL MOD COMPLEX 45 MIN: CPT

## 2022-02-02 PROCEDURE — 63600175 PHARM REV CODE 636 W HCPCS

## 2022-02-02 PROCEDURE — 20000000 HC ICU ROOM

## 2022-02-02 PROCEDURE — 94761 N-INVAS EAR/PLS OXIMETRY MLT: CPT

## 2022-02-02 RX ORDER — ENOXAPARIN SODIUM 100 MG/ML
40 INJECTION SUBCUTANEOUS EVERY 24 HOURS
Status: DISCONTINUED | OUTPATIENT
Start: 2022-02-02 | End: 2022-02-03

## 2022-02-02 RX ORDER — MODAFINIL 100 MG/1
100 TABLET ORAL EVERY MORNING
Status: DISCONTINUED | OUTPATIENT
Start: 2022-02-02 | End: 2022-02-08 | Stop reason: HOSPADM

## 2022-02-02 RX ORDER — AMOXICILLIN 250 MG
1 CAPSULE ORAL 2 TIMES DAILY
Status: DISCONTINUED | OUTPATIENT
Start: 2022-02-02 | End: 2022-02-08 | Stop reason: HOSPADM

## 2022-02-02 RX ADMIN — Medication 2 G: at 05:02

## 2022-02-02 RX ADMIN — ENOXAPARIN SODIUM 40 MG: 100 INJECTION SUBCUTANEOUS at 05:02

## 2022-02-02 RX ADMIN — LEVETIRACETAM 1000 MG: 500 INJECTION, SOLUTION INTRAVENOUS at 08:02

## 2022-02-02 RX ADMIN — DEXAMETHASONE SODIUM PHOSPHATE 4 MG: 4 INJECTION INTRA-ARTICULAR; INTRALESIONAL; INTRAMUSCULAR; INTRAVENOUS; SOFT TISSUE at 12:02

## 2022-02-02 RX ADMIN — MODAFINIL 100 MG: 100 TABLET ORAL at 12:02

## 2022-02-02 RX ADMIN — MINERAL OIL AND WHITE PETROLATUM: 30; 940 OINTMENT OPHTHALMIC at 10:02

## 2022-02-02 RX ADMIN — POTASSIUM CHLORIDE 40 MEQ: 7.46 INJECTION, SOLUTION INTRAVENOUS at 04:02

## 2022-02-02 RX ADMIN — MUPIROCIN: 20 OINTMENT TOPICAL at 08:02

## 2022-02-02 RX ADMIN — DEXAMETHASONE SODIUM PHOSPHATE 4 MG: 4 INJECTION INTRA-ARTICULAR; INTRALESIONAL; INTRAMUSCULAR; INTRAVENOUS; SOFT TISSUE at 05:02

## 2022-02-02 RX ADMIN — DOCUSATE SODIUM 50 MG AND SENNOSIDES 8.6 MG 1 TABLET: 8.6; 5 TABLET, FILM COATED ORAL at 08:02

## 2022-02-02 RX ADMIN — MUPIROCIN: 20 OINTMENT TOPICAL at 09:02

## 2022-02-02 RX ADMIN — DEXAMETHASONE SODIUM PHOSPHATE 4 MG: 4 INJECTION INTRA-ARTICULAR; INTRALESIONAL; INTRAMUSCULAR; INTRAVENOUS; SOFT TISSUE at 06:02

## 2022-02-02 RX ADMIN — LEVETIRACETAM 1000 MG: 500 INJECTION, SOLUTION INTRAVENOUS at 09:02

## 2022-02-02 RX ADMIN — FAMOTIDINE 20 MG: 20 TABLET, FILM COATED ORAL at 09:02

## 2022-02-02 RX ADMIN — TOPIRAMATE 200 MG: 200 TABLET, FILM COATED ORAL at 08:02

## 2022-02-02 RX ADMIN — TOPIRAMATE 200 MG: 200 TABLET, FILM COATED ORAL at 09:02

## 2022-02-02 RX ADMIN — FAMOTIDINE 20 MG: 20 TABLET, FILM COATED ORAL at 08:02

## 2022-02-02 NOTE — ASSESSMENT & PLAN NOTE
S/p L craniotomy and L temporal lobectomy for refractory epilepsy with new L BG infarct noted on post-op MRI    -epilepsy, NSGY following  -cont home topamax and keppra   -post op CTH with expected post op changes, nneumocephalus noted anteriorly bilaterally  -post op MRI with new L BG infarct   -SBP <140, prn hydralazine and labetalol  -dex 4 q6h + H2B  -SG HV drain in place with cefazolin ppx, management per NSGY  -q1h neuro, vital checks  -EuNa, EuGly  -PT/OT/SLP as appropriate  -CTA unremarkable, vascular neurology signed off 2/2  -Lovenox

## 2022-02-02 NOTE — PLAN OF CARE
Problem: Physical Therapy Goal  Goal: Physical Therapy Goal  Description: Goals to be met by: 2022     Patient will increase functional independence with mobility by performin. Supine to sit with MInimal Assistance  2. Sit to supine with MInimal Assistance  3. Sit to stand transfer with Minimal Assistance  4. Bed to chair transfer with Minimal Assistance using LRAD  5. Gait  x 5 feet with Minimal Assistance using LRAD.   6. Sitting at edge of bed x10 minutes with Contact Guard Assistance  7. Lower extremity exercise program x15 reps per handout, with assistance as needed    Outcome: Ongoing, Progressing

## 2022-02-02 NOTE — PT/OT/SLP EVAL
Physical Therapy Co-Evaluation    Patient Name:  Carolyn Mccullough   MRN:  [id number]     OT for cotx due to pt's multiple medical comorbidities and functional deficits req'ing two skilled therapists to appropriately maximize functional potential by progressing pt's musculoskeletal strength and endurance, facilitating neuromuscular postural balance and control ,and accommodating for patient's impaired cardiopulmonary tolerance to activities.     Recommendations:     Discharge Recommendations:  rehabilitation facility   Discharge Equipment Recommendations:  (TBD)   Barriers to discharge: None    Assessment:     Carolyn Mccullough is a 31 y.o. female admitted with a medical diagnosis of Localization-related (focal) (partial) symptomatic epilepsy and epileptic syndromes with complex partial seizures, intractable, without status epilepticus.  She presents with the following impairments/functional limitations:  weakness,impaired endurance,impaired self care skills,impaired functional mobilty,gait instability,impaired balance,decreased upper extremity function,decreased lower extremity function,decreased safety awareness,decreased coordination,impaired fine motor,abnormal tone,impaired cognition Pt. cooperative and tolerated treatment well. Pt. progressing with mobility, but has significant (R) sided weakness.    Rehab Prognosis: Good; patient would benefit from acute skilled PT services to address these deficits and reach maximum level of function.    Recent Surgery: Procedure(s) (LRB):  LEFT CRANIOTOMY FOR TEMPORAL LOBECTOMY WITH BRAINLAB (Left) 2 Days Post-Op    Plan:     During this hospitalization, patient to be seen 4 x/week to address the identified rehab impairments via gait training,therapeutic activities,therapeutic exercises,neuromuscular re-education and progress toward the following goals:    · Plan of Care Expires:  03/04/22    Subjective     Chief Complaint: NAD  Patient/Family Comments/goals: pt. Agreeable to  PT  Pain/Comfort:  · Pain Rating 1: 0/10  · Pain Rating Post-Intervention 1: 0/10    Patients cultural, spiritual, Anabaptism conflicts given the current situation: no    Living Environment:  Pt. Lives with mother in 1st floor apartment  Prior to admission, patients level of function was indep.  Equipment used at home: none.  Upon discharge, patient will have assistance from mother.    Objective:     Communicated with nursing prior to session.  Patient found supine with arterial line,pulse ox (continuous),telemetry,peripheral IV,hemovac  upon PT entry to room.    General Precautions: Standard, aspiration,fall   Orthopedic Precautions:N/A   Braces: N/A  Respiratory Status: Room air    Exams:  · RLE ROM: WFL  · RLE Strength: 1/5  · LLE ROM: WFL  · LLE Strength: WFL    Functional Mobility:  · Bed Mobility:     · Rolling Left:  maximal assistance and of 2 persons  · Scooting: maximal assistance and of 2 persons  · Supine to Sit: maximal assistance and of 2 persons  · Sit to Supine: maximal assistance and of 2 persons  · Transfers:     · Sit to Stand:  maximal assistance with hand-held assist with (R) knee blocked  · Balance: poor sitting/standing    Therapeutic Activities and Exercises:   Pt. Performed sitting balance/tolerance on EOB with Mod A for >5 min. Discussed therapy needs, goals, and POC.    AM-PAC 6 CLICK MOBILITY  Total Score:11     Patient left supine with all lines intact and call button in reach.    GOALS:   Multidisciplinary Problems     Physical Therapy Goals        Problem: Physical Therapy Goal    Goal Priority Disciplines Outcome Goal Variances Interventions   Physical Therapy Goal     PT, PT/OT Ongoing, Progressing     Description: Goals to be met by: 2022     Patient will increase functional independence with mobility by performin. Supine to sit with MInimal Assistance  2. Sit to supine with MInimal Assistance  3. Sit to stand transfer with Minimal Assistance  4. Bed to chair transfer  with Minimal Assistance using LRAD  5. Gait  x 5 feet with Minimal Assistance using LRAD.   6. Sitting at edge of bed x10 minutes with Contact Guard Assistance  7. Lower extremity exercise program x15 reps per handout, with assistance as needed                     History:     Past Medical History:   Diagnosis Date    Seizures        Past Surgical History:   Procedure Laterality Date    BRAIN SURGERY      sEEG left [facility]     SECTION      SURGICAL REMOVAL OF LOBE OF BRAIN Left 2022    Procedure: LEFT CRANIOTOMY FOR TEMPORAL LOBECTOMY WITH BRAINLAB;  Surgeon: Mayra Iraheta MD;  Location: [facility] OR [address] [place] [address];  Service: Neurosurgery;  Laterality: Left;  TORONTO III, ASA III, BLOOD TYPE &CROSS 2UNITS, HEADREST LUCIANO, SUPINE POSITION, BRAINLAB, REGULAR BED    TUBAL LIGATION      Robbin 4 years ago       Time Tracking:     PT Received On: 22  PT Start Time: 919     PT Stop Time: 939  PT Total Time (min): 20 min     Billable Minutes: Evaluation 12 and Neuromuscular Re-education 8      2022

## 2022-02-02 NOTE — SUBJECTIVE & OBJECTIVE
Interval History:  CTA overnight unremarkable. Qd modafinil.     Review of Systems   Unable to perform ROS: Acuity of condition     Objective:     Vitals:  Temp: 98.2 °F (36.8 °C)  Pulse: 80  Rhythm: normal sinus rhythm  BP: 124/73  MAP (mmHg): 93  Resp: 17  SpO2: 100 %  O2 Device (Oxygen Therapy): room air    Temp  Min: 97.8 °F (36.6 °C)  Max: 98.2 °F (36.8 °C)  Pulse  Min: 69  Max: 92  BP  Min: 117/76  Max: 142/68  MAP (mmHg)  Min: 89  Max: 102  Resp  Min: 13  Max: 28  SpO2  Min: 92 %  Max: 100 %    02/01 0701 - 02/02 0700  In: 243.7 [I.V.:26.1]  Out: 1065 [Urine:1000; Drains:65]   Unmeasured Output  Stool Occurrence: 0       Physical Exam  Physical Exam  Constitutional: Well-nourished, well-developed. No obvious distress.  Eyes: Clear conjunctiva. Anicteric. No discharge. Lids without lesions.  HEENT: MMM. Nose, external ears atraumatic. Surgical dressing c/d/i.  Cardio: RRR. Pulses intact. Capillary refill time < 2 seconds.  Respiratory: Clear to auscultation. Regular effort.  GI: Bowel sounds present. Soft, non-distended, non-tender.     Neurologic:  [id number]  R-sided facial droop  Expressive aphasia improving  PERRL, EOMI  Follows commands and MEGAN and AG on left side  WD on RLE  Minimal AG on RUE    Medications:  Continuoussodium chloride 0.9%, Last Rate: Stopped (02/01/22 0625)    ScheduledceFAZolin (ANCEF) IVPB, 2 g, Q8H  dexamethasone, 4 mg, Q6H  enoxaparin, 40 mg, Daily  famotidine, 20 mg, BID  levetiracetam IV, 1,000 mg, Q12H  modafiniL, 100 mg, QAM  mupirocin, , BID  senna-docusate 8.6-50 mg, 1 tablet, BID  topiramate, 200 mg, BID  white petrolatum-mineral oiL, , BID    PRNacetaminophen, 650 mg, Q4H PRN  calcium gluconate IVPB, 1 g, PRN  calcium gluconate IVPB, 2 g, PRN  calcium gluconate IVPB, 3 g, PRN  dextrose 50%, 12.5 g, PRN  dextrose 50%, 25 g, PRN  glucagon (human recombinant), 1 mg, PRN  glucose, 16 g, PRN  glucose, 24 g, PRN  hydrALAZINE, 20 mg, Q6H PRN  HYDROcodone-acetaminophen, 1 tablet, Q4H  PRN  insulin aspart U-100, 0-5 Units, QID (AC + HS) PRN  labetaloL, 10 mg, Q4H PRN  magnesium sulfate IVPB, 2 g, PRN  magnesium sulfate IVPB, 4 g, PRN  ondansetron, 8 mg, Q8H PRN  ondansetron, 4 mg, Q12H PRN  potassium chloride in water, 40 mEq, PRN   And  potassium chloride in water, 60 mEq, PRN   And  potassium chloride in water, 80 mEq, PRN  potassium, sodium phosphates, 2 packet, PRN  potassium, sodium phosphates, 2 packet, PRN  potassium, sodium phosphates, 2 packet, PRN      Today I personally reviewed pertinent medications, lines/drains/airways, imaging, cardiology results, laboratory results, microbiology results, notably:    Diet  Diet Adult Regular (IDDSI Level 7) [facility]; Consistent Carbohydrate  Diet Adult Regular (IDDSI Level 7) [facility]; Consistent Carbohydrate

## 2022-02-02 NOTE — PROGRESS NOTES
[facility]  Neurology-Epilepsy  Progress Note    Patient Name: Carolyn Mccullough  MRN: [id number]  Admission Date: 1/31/2022  Hospital Length of Stay: 2 days  Code Status: No Order   Attending Provider: Sakshi Bird MD  Primary Care Physician: Fadumo Laughlin MD   Principal Problem:Localization-related (focal) (partial) symptomatic epilepsy and epileptic syndromes with complex partial seizures, intractable, without status epilepticus    Subjective:     Hospital Course:   1/31/21: Left anterior temporal lobectomy completed by NSGY for surgical management of refractory epilepsy. Post-operatively, patient noted to have right hemiparesis, aphasia. MRI brain showing acute infarction involving the left lentiform nucleus. Patient continued on home AED regimen - Levetiracetam 1000 mg BID, Topiramate 200 mg BID, however unable to receive TPM given current NPO status.  2/1/21: Patient continued on Levetiracetam 1000 mg BID, pending NG placement for Topiramate. If unable to obtain enteral access, recommend increasing Levetiracetam to 1500 mg BID until it is established. Vascular Neurology sonsulted,appeciate recommendations.   2/2/21: Patient tolerating PO medications crushed, on home Topiramate 200 mg BID, Levetiracetam 1000 mg BID. Some mumbled speech in response to questions, difficult to understand, at times more clear per nursing. Withdrawal to noxious stimuli on R, toes now down-going on R.       Interval History: Slight improvement in speech, some mumbled responses to questions, withdraws to noxious stimuli on R. Mother at bedside. Patient tolerating crushed medications in applesauce.     Current Facility-Administered Medications   Medication Dose Route Frequency Provider Last Rate Last Admin    0.9%  NaCl infusion   Intravenous Continuous Chichi Lopez PA-C   Stopped at 02/01/22 0625    acetaminophen tablet 650 mg  650 mg Oral Q4H PRN Anderson Ac MD        calcium gluconate 1  g in dextrose 5 % 100 mL IVPB  1 g Intravenous PRN Chichi Lopez PA-C        calcium gluconate 2 g in dextrose 5 % 100 mL IVPB  2 g Intravenous PRN Chichi Lopez PA-C        calcium gluconate 3 g in dextrose 5 % 100 mL IVPB  3 g Intravenous PRN Chichi Lopez PA-C        ceFAZolin 2 gram in dextrose 5% 100 mL IVPB (premix)  2 g Intravenous Q8H Anderson Ac MD   Stopped at 02/02/22 0629    dexamethasone injection 4 mg  4 mg Intravenous Q6H Anderson Ac MD   4 mg at 02/02/22 1238    dextrose 50% injection 12.5 g  12.5 g Intravenous PRN Chichi Lopez PA-C        dextrose 50% injection 25 g  25 g Intravenous PRN Chichi Lopez PA-C        enoxaparin injection 40 mg  40 mg Subcutaneous Daily Ingrid Arnold PA-C        famotidine tablet 20 mg  20 mg Oral BID Anderson Ac MD   20 mg at 02/02/22 0900    glucagon (human recombinant) injection 1 mg  1 mg Intramuscular PRN Chichi Lopez PA-C        glucose chewable tablet 16 g  16 g Oral PRN Chichi Lopez PA-C        glucose chewable tablet 24 g  24 g Oral PRN Chichi Lopez PA-C        hydrALAZINE injection 20 mg  20 mg Intravenous Q6H PRN Ingrid Arnold PA-C        HYDROcodone-acetaminophen 5-325 mg per tablet 1 tablet  1 tablet Oral Q4H PRN Anderson Ac MD        insulin aspart U-100 pen 0-5 Units  0-5 Units Subcutaneous QID (AC + HS) PRN Chichi Lopez PA-C        labetaloL injection 10 mg  10 mg Intravenous Q4H PRN Ingrid Arnold PA-C        levETIRAcetam injection 1,000 mg  1,000 mg Intravenous Q12H Chichi Lopez PA-C   1,000 mg at 02/02/22 0951    magnesium sulfate 2g in water 50mL IVPB (premix)  2 g Intravenous PRN Chichi Lopez PA-C        magnesium sulfate 2g in water 50mL IVPB (premix)  4 g Intravenous PRN Chichi Lopez PA-C        modafiniL tablet 100 mg  100 mg Oral QAM Ingrid Arnold PA-C   100 mg at 02/02/22 1238    mupirocin 2 % ointment   Nasal BID  Anderson Ac MD   Given at 02/02/22 0953    ondansetron disintegrating tablet 8 mg  8 mg Oral Q8H PRN Anderson Ac MD        ondansetron injection 4 mg  4 mg Intravenous Q12H PRN Anderson Ac MD        potassium chloride 10 mEq in 100 mL IVPB  40 mEq Intravenous PRN Chichi Lopez PA-C   Stopped at 02/02/22 0557    And    potassium chloride 10 mEq in 100 mL IVPB  60 mEq Intravenous PRN Chichi Lopez PA-C        And    potassium chloride 10 mEq in 100 mL IVPB  80 mEq Intravenous PRN Chichi Lopez PA-C        potassium, sodium phosphates 280-160-250 mg packet 2 packet  2 packet Oral PRN Chichi Lopez PA-C        potassium, sodium phosphates 280-160-250 mg packet 2 packet  2 packet Oral PRN Chichi Lopez PA-C        potassium, sodium phosphates 280-160-250 mg packet 2 packet  2 packet Oral PRN Chichi Lopez PA-C        senna-docusate 8.6-50 mg per tablet 1 tablet  1 tablet Oral BID Luis San MD        topiramate tablet 200 mg  200 mg Oral BID Anderson Ac MD   200 mg at 02/02/22 0946    white petrolatum-mineral oil (LUBIFRESH P.M.) ophthalmic ointment   Right Eye BID Anderson Ac MD         Continuous Infusions:   sodium chloride 0.9% Stopped (02/01/22 0625)       Review of Systems   Unable to perform ROS: Other     Objective:     Vital Signs (Most Recent):  Temp: 98.2 °F (36.8 °C) (02/02/22 0705)  Pulse: 80 (02/02/22 0905)  Resp: 17 (02/02/22 0905)  BP: 124/73 (02/02/22 0905)  SpO2: 100 % (02/02/22 0905) Vital Signs (24h Range):  Temp:  [97.8 °F (36.6 °C)-98.2 °F (36.8 °C)] 98.2 °F (36.8 °C)  Pulse:  [69-92] 80  Resp:  [13-28] 17  SpO2:  [92 %-100 %] 100 %  BP: (117-142)/(68-86) 124/73  Arterial Line BP: ()/() 132/119     Weight: 118.8 kg (262 lb)  Body mass index is 43.6 kg/m².    Physical Exam  Vitals and nursing note reviewed.   Constitutional:       Appearance: She is not toxic-appearing or diaphoretic.   HENT:       Head:      Comments: Surgical dressing in place  Eyes:      General: No scleral icterus.     Conjunctiva/sclera: Conjunctivae normal.      Pupils: Pupils are equal, round, and reactive to light.   Cardiovascular:      Rate and Rhythm: Normal rate.   Pulmonary:      Effort: Pulmonary effort is normal. No respiratory distress.   Abdominal:      General: There is no distension.      Palpations: Abdomen is soft.   Musculoskeletal:         General: No deformity or signs of injury.      Cervical back: Neck supple. No rigidity.   Skin:     General: Skin is warm and dry.   Neurological:      Mental Status: She is alert.   Psychiatric:      Comments: Unable to assess         NEUROLOGICAL EXAMINATION:     MENTAL STATUS        Alert, tracks examiner       CRANIAL NERVES     CN III, IV, VI   Pupils are equal, round, and reactive to light.    CN VII   Right facial weakness: central       Expressive aphasia, some mumbled speech in response to questions       MOTOR EXAM        Withdraws to noxious stimuli on right  Spontaneous movements on left, follows commands       Significant Labs: All pertinent lab results from the past 24 hours have been reviewed.    Significant Studies: I have reviewed all pertinent imaging results/findings within the past 24 hours.    Assessment and Plan:     * Localization-related (focal) (partial) symptomatic epilepsy and epileptic syndromes with complex partial seizures, intractable, without status epilepticus  30 yo female with intractable epilepsy admitted to NICU after left anterior temporal lobectomy for surgical management. Post-op MRI with acute infarct involving left basal ganglia    Recommendations:  - Continue outpatient AED regimen - Levetiracetam 1000 mg BID, Topiramate 200 mg BID  - Vascular Neurology following, CTA H/N completed   - Further imaging/workup per NSGY, Vascular, NCC  - Seizure precautions    Plan of care discussed with NCC team. Will continue to follow peripherally, please  call with any questions.    Acute cerebrovascular accident (CVA) of basal ganglia  - Noted on MRI post-operatively, patient with expressive aphasia, right hemiparesis  - BP management per NCC, goal <160 in setting of recent infarct  - q1h neurochecks, management per NCC/Vascular Neuro    Essential hypertension  - Vitals reviewed, BP management per NCC        VTE Risk Mitigation (From admission, onward)         Ordered     enoxaparin injection 40 mg  Daily         02/02/22 1045     Place sequential compression device  Until discontinued         01/31/22 1033     Place ERON hose  Until discontinued         01/31/22 1033                Melissa Cox PA-C  Neurology-Epilepsy  [facility]  Staff: Dr. Fritz

## 2022-02-02 NOTE — PLAN OF CARE
Continue NPO diet at this time. Pt may have pleasure feeds of thin liquids and puree sparingly throughout the day when awake/alert   2/2/2022

## 2022-02-02 NOTE — PT/OT/SLP EVAL
"Occupational Therapy   Evaluation and Co-treatment    Name: Carolyn Mccullough  MRN: [id number]  Admitting Diagnosis:  Localization-related (focal) (partial) symptomatic epilepsy and epileptic syndromes with complex partial seizures, intractable, without status epilepticus  Recent Surgery: Procedure(s) (LRB):  LEFT CRANIOTOMY FOR TEMPORAL LOBECTOMY WITH BRAINLAB (Left) 2 Days Post-Op    Recommendations:     Discharge Recommendations: rehabilitation facility  Discharge Equipment Recommendations:   (to be assessed prior to D/C)  Barriers to discharge:  None    Assessment:     Carolyn Mccullough is a 31 y.o. female with a medical diagnosis of Localization-related (focal) (partial) symptomatic epilepsy and epileptic syndromes with complex partial seizures, intractable, without status epilepticus.  She presents with cooperation towards therapists and no objection to participating in therapy. Pt has expressive aphasia, right facial droop and facial droop. Pt is able to follow one step commands but deficits in comprehending basic orientation questions as she answered "yes" to all (though incorrect). Performance deficits affecting function: weakness,impaired endurance,impaired self care skills,impaired functional mobilty,decreased coordination,impaired cognition,impaired balance,gait instability,decreased upper extremity function,decreased lower extremity function,abnormal tone,impaired fine motor,impaired coordination,decreased ROM.  Patient would benefit from continued skilled acute OT 4 x/wk to improve functional mobility, increase independence with ADLs, and address established goals. Recommending rehab once medically appropriate for discharge to increase maximal independence, reduce burden of care, and ensure safety.     Rehab Prognosis: Good; patient would benefit from acute skilled OT services to address these deficits and reach maximum level of function.       Plan:     Patient to be seen 4 x/week to address the above " listed problems via self-care/home management,therapeutic activities,therapeutic exercises,neuromuscular re-education,cognitive retraining  · Plan of Care Expires: 03/02/22  · Plan of Care Reviewed with: patient,mother    Subjective   Pts mother stated her daughter needed supervision with bathing at home PTA. I with all other ADL's.   Chief Complaint: Pt was unable to voice any complaints.  Patient/Family Comments/goals: Pts mother would like for her daughter to be D/C'd to a rehab facility.    Occupational Profile:  Living Environment: Pt lives with her parents in an apartment with one FABIAN. Bedroom, bathroom on one floor  Previous level of function: As per mothers report, pt was I with ADL's but needed A with bathing. Able to ambulate without AD.  Roles and Routines: Pt enjoyed beading as a hobby PTA.  Equipment Used at Home: Pt does not have DME but has comfort level toilet seat.   Assistance upon Discharge: Pt will require an increased level of assist upon D/C.    Pain/Comfort:  · Pain Rating 1:  (Pt was unable to rate pain due to aphasia.Pt did not respond positivly when asked if in pain.)  · Pain Rating Post-Intervention 1: 0/10    Patients cultural, spiritual, Bahai conflicts given the current situation: no    Objective:     Communicated with: nurse prior to session.  Patient found HOB elevated with arterial line,peripheral IV,hemovac upon OT entry to room.    General Precautions: Standard, aspiration,NPO,fall   Orthopedic Precautions:N/A   Braces: N/A  Respiratory Status: Room air    Occupational Performance:    Bed Mobility:    · Patient completed Rolling/Turning to Left with maximal assistance  · Patient completed Scooting/Bridging with maximal assistance   · Patient completed Supine to Sit with maximal assistance x 2 for legs and trunk.  · Patient completed Sit to Supine with maximal assistance x 2 for legs and trunk.    Functional Mobility/Transfers:  · Patient completed Sit <> Stand Transfer with  maximal assistance  with  stability provided at R knee and foot.    · Functional Mobility: Pt was unable to perform functional mobility at this eval.    Activities of Daily Living:  · Grooming: contact guard assistance to wash face sitting EOB with L hand.    Cognitive/Visual Perceptual:  Cognitive/Psychosocial Skills:     -       Oriented to: Person   -       Follows Commands/attention:Follows one-step commands  -       Communication: expressive aphasia  -       Memory: unable to assess secondary to aphasia.  -       Safety awareness/insight to disability: unable to assess secondary to aphasia.   -       Mood/Affect/Coping skills/emotional control: Cooperative  Visual/Perceptual:      -Pt  able to track therapists finger in all planes with both eyes.      Physical Exam:  Balance: -       Pts Mod A for balance and requires stability on R side to maintain midline.  Skin integrity: Visible skin intact  Edema:  None noted  Sensation:    -       Intact for light touch LUE.  -       Impaired  on RUE for light touch.   Motor Planning: -  Pt has delayed coordination and speed.  Dominant hand: -   right  Upper Extremity Range of Motion:     -       Right Upper Extremity: No AROM, no trace muscle movements felt.  -       Left Upper Extremity: AROM functional to 90 with splint. AAROM WFL with no pain noted in any avaialble plane.  Upper Extremity Strength:    -       Right Upper Extremity: 0/5  -       Left Upper Extremity: WFL   Strength:    -       Right Upper Extremity: 0/5  -       Left Upper Extremity: WNL  Fine Motor Coordination:    -       Intact for L hand tip to tip, R hand no FM noted.  Gross motor coordination: impaired secondary to no AROM in RUE.    AMPAC 6 Click ADL:  AMPAC Total Score: 9    Treatment & Education:  Role of OT and POC  Safety  ADL retraining  Functional mobility training  Discharge planning  Importance of EOB/OOB activity  Goal to transfer sit to stand with min A with AD.  Co-treatment  performed due to patient's multiple deficits requiring two skilled therapists to appropriately and safely assess patient's strength and endurance while facilitating functional tasks in addition to accommodating for patient's activity tolerance.           Education:    Patient left HOB elevated with all lines intact, call button in reach, bed alarm on, nurse notified and mother present    GOALS:   Multidisciplinary Problems     Occupational Therapy Goals        Problem: Occupational Therapy Goal    Goal Priority Disciplines Outcome Interventions   Occupational Therapy Goal     OT, PT/OT Ongoing, Progressing    Description: Goals to be met by: 2022    Patient will increase functional independence with ADLs by performing:    UE Dressing with Minimal Assistance.  Grooming while EOB with Minimal Assistance.  Sitting at edge of bed 10 minutes with Contact Guard Assistance.  Rolling to Left with Minimal Assistance.   Supine to sit with Minimal Assistance.                     History:     Past Medical History:   Diagnosis Date    Seizures        Past Surgical History:   Procedure Laterality Date    BRAIN SURGERY      [facility]     SECTION      SURGICAL REMOVAL OF LOBE OF BRAIN Left 2022    Procedure: LEFT CRANIOTOMY FOR TEMPORAL LOBECTOMY WITH BRAINLAB;  Surgeon: Mayra Iraheta MD;  Location: [facility] OR [address] [place] [address];  Service: Neurosurgery;  Laterality: Left;  TORONTO III, ASA III, BLOOD TYPE &CROSS 2UNITS, HEADREST LUCIANO, SUPINE POSITION, BRAINLAB, REGULAR BED    TUBAL LIGATION      [place] 4 years ago       Time Tracking:     OT Date of Treatment: 22  OT Start Time: 916  OT Stop Time: 941  OT Total Time (min): 25 min    Billable Minutes:Evaluation 10  Therapeutic Activity 15    2022

## 2022-02-02 NOTE — ASSESSMENT & PLAN NOTE
1/31 event: d/t to somnolence, not moving R side, and L fixed gaze, another 1g of keppra (2g total) was given as well as 4mg dex IV, MRI STAT was performed showing acute infarction involving the left lentiform nucleus. Unfortunately not a candidate for tPA d/t surgery, likely iatrogenic in nature. Upon return from MRI, pt WD R side, still drowsy but not requiring sternal rub. GCS 10, still not gagging on oral airway left in place.  -liberalize BP goal to <160 in light of CVA  -NS @75, avoid hypotension  -q1h neuro checks  2/2 CVA unremarkable

## 2022-02-02 NOTE — PLAN OF CARE
Recommendations    1. Continue Regular diet, texture per SLP   - Diabetic restrictions prn for hyperglycemia >200 mg/dL     2. If PO intake <50%, add Boost Glucose Control BID     3. Please assist with all meals prn and encourage >50% meal intake     4. RD to monitor    Goals: Pt will meet and tolerate >65% EEN/EPN by RD f/u  Nutrition Goal Status: new    Communication of RD Recs:  (POC)    Lucila Llamas MS, RD, LDN  Ext: [phone]

## 2022-02-02 NOTE — ASSESSMENT & PLAN NOTE
32 yo female with intractable epilepsy admitted to NICU after left anterior temporal lobectomy for surgical management. Post-op MRI with acute infarct involving left basal ganglia    Recommendations:  - Continue outpatient AED regimen - Levetiracetam 1000 mg BID, Topiramate 200 mg BID  - Vascular Neurology following, CTA H/N completed   - Further imaging/workup per NSGY, Vascular, NCC  - Seizure precautions    Plan of care discussed with NCC team. Will continue to follow peripherally, please call with any questions.

## 2022-02-02 NOTE — PROGRESS NOTES
[facility] Neuro [facility]  Neurocritical Care  Progress Note    Admit Date: 1/31/2022  Service Date: 02/02/2022  Length of Stay: 2    Subjective:     Chief Complaint: Localization-related (focal) (partial) symptomatic epilepsy and epileptic syndromes with complex partial seizures, intractable, without status epilepticus    History of Present Illness: 30 yo F with insignificant PMH presents to [facility] s/p L craniotomy with L temporal lobectomy for intractable left temporal lobe epilepsy. The patient has undergone extensive workup, including sEEG electrode placement at [facility] here in town 10-11/2020 that demonstrated all seizures with a mesial temporal onset. She follows with Chelo Stapleton and Pedro. Pt's mother, Karina, reports that Carolyn experiences about 3 events/month presently. She endorses 2 semiologies: in the first, Carolyn's eyes roll back, she then experiences all-over shaking. She also sometimes has staring with drooling episodes. Seizure onset was in 2009. They initially started while she was asleep, but now happen at all times of day, moreso while awake. Empty sella configuration noted on previous MRI from [facility], regarding IIH symptoms: she endorses occasional headaches, but usually only after seizures. There is no headache associated with exertional activities otherwise. She had an allergic reaction to CT contrast and also to tegretol. Pt takes topiramate 200mg bid and keppra 1g bid. Pt admitted to [facility] for monitoring and management of s/p L craniotomy with L temporal lobectomy for intractable left temporal lobe epilepsy.      Hospital Course: 02/01/2022: New acute BG infarct noted on post-op CT. Stroke Consult. Slight improvement of exam throughout the day.   02/02/2022: CTA overnight unremarkable. Qd modafinil.       Interval History:  CTA overnight unremarkable. Qd modafinil.     Review of Systems   Unable to perform ROS: Acuity of condition     Objective:     Vitals:  Temp:  98.2 °F (36.8 °C)  Pulse: 80  Rhythm: normal sinus rhythm  BP: 124/73  MAP (mmHg): 93  Resp: 17  SpO2: 100 %  O2 Device (Oxygen Therapy): room air    Temp  Min: 97.8 °F (36.6 °C)  Max: 98.2 °F (36.8 °C)  Pulse  Min: 69  Max: 92  BP  Min: 117/76  Max: 142/68  MAP (mmHg)  Min: 89  Max: 102  Resp  Min: 13  Max: 28  SpO2  Min: 92 %  Max: 100 %    02/01 0701 - 02/02 0700  In: 243.7 [I.V.:26.1]  Out: 1065 [Urine:1000; Drains:65]   Unmeasured Output  Stool Occurrence: 0       Physical Exam  Physical Exam  Constitutional: Well-nourished, well-developed. No obvious distress.  Eyes: Clear conjunctiva. Anicteric. No discharge. Lids without lesions.  HEENT: MMM. Nose, external ears atraumatic. Surgical dressing c/d/i.  Cardio: RRR. Pulses intact. Capillary refill time < 2 seconds.  Respiratory: Clear to auscultation. Regular effort.  GI: Bowel sounds present. Soft, non-distended, non-tender.     Neurologic:  [id number]  R-sided facial droop  Expressive aphasia improving  PERRL, EOMI  Follows commands and MEGAN and AG on left side  WD on RLE  Minimal AG on RUE    Medications:  Continuoussodium chloride 0.9%, Last Rate: Stopped (02/01/22 0625)    ScheduledceFAZolin (ANCEF) IVPB, 2 g, Q8H  dexamethasone, 4 mg, Q6H  enoxaparin, 40 mg, Daily  famotidine, 20 mg, BID  levetiracetam IV, 1,000 mg, Q12H  modafiniL, 100 mg, QAM  mupirocin, , BID  senna-docusate 8.6-50 mg, 1 tablet, BID  topiramate, 200 mg, BID  white petrolatum-mineral oiL, , BID    PRNacetaminophen, 650 mg, Q4H PRN  calcium gluconate IVPB, 1 g, PRN  calcium gluconate IVPB, 2 g, PRN  calcium gluconate IVPB, 3 g, PRN  dextrose 50%, 12.5 g, PRN  dextrose 50%, 25 g, PRN  glucagon (human recombinant), 1 mg, PRN  glucose, 16 g, PRN  glucose, 24 g, PRN  hydrALAZINE, 20 mg, Q6H PRN  HYDROcodone-acetaminophen, 1 tablet, Q4H PRN  insulin aspart U-100, 0-5 Units, QID (AC + HS) PRN  labetaloL, 10 mg, Q4H PRN  magnesium sulfate IVPB, 2 g, PRN  magnesium sulfate IVPB, 4 g,  PRN  ondansetron, 8 mg, Q8H PRN  ondansetron, 4 mg, Q12H PRN  potassium chloride in water, 40 mEq, PRN   And  potassium chloride in water, 60 mEq, PRN   And  potassium chloride in water, 80 mEq, PRN  potassium, sodium phosphates, 2 packet, PRN  potassium, sodium phosphates, 2 packet, PRN  potassium, sodium phosphates, 2 packet, PRN      Today I personally reviewed pertinent medications, lines/drains/airways, imaging, cardiology results, laboratory results, microbiology results, notably:    Diet  Diet Adult Regular (IDDSI Level 7) [facility]; Consistent Carbohydrate  Diet Adult Regular (IDDSI Level 7) [facility]; Consistent Carbohydrate          Assessment/Plan:     Neuro  * Localization-related (focal) (partial) symptomatic epilepsy and epileptic syndromes with complex partial seizures, intractable, without status epilepticus  S/p L craniotomy and L temporal lobectomy for refractory epilepsy with new L BG infarct noted on post-op MRI    -epilepsy, NSGY following  -cont home topamax and keppra   -post op CTH with expected post op changes, nneumocephalus noted anteriorly bilaterally  -post op MRI with new L BG infarct   -SBP <140, prn hydralazine and labetalol  -dex 4 q6h + H2B  -SG HV drain in place with cefazolin ppx, management per NSGY  -q1h neuro, vital checks  -EuNa, EuGly  -PT/OT/SLP as appropriate  -CTA unremarkable, vascular neurology signed off 2/2  -Lovenox      Thrombotic stroke involving left middle cerebral artery-resolved as of 2/2/2022 1/31 event: d/t to somnolence, not moving R side, and L fixed gaze, another 1g of keppra (2g total) was given as well as 4mg dex IV, MRI STAT was performed showing acute infarction involving the left lentiform nucleus. Unfortunately not a candidate for tPA d/t surgery, likely iatrogenic in nature. Upon return from MRI, pt WD R side, still drowsy but not requiring sternal rub. GCS 10, still not gagging on oral airway left in place.  -liberalize BP goal to  <160 in light of CVA  -NS @75, avoid hypotension  -q1h neuro checks  2/2 CVA unremarkable     Derm  DRESS syndrome  2016    Cardiac/Vascular  Essential hypertension  SBP <160 post op  No longer requiring cardene  labetalol, hydralazine prn    Endocrine  History of prediabetes  A1C 5.6  SSI and accu checks  Consistent carb diet    Morbid obesity  Nutrition consult    Other  Acute cerebrovascular accident (CVA) of basal ganglia  1/31 event: d/t to somnolence, not moving R side, and L fixed gaze, another 1g of keppra (2g total) was given as well as 4mg dex IV, MRI STAT was performed showing acute infarction involving the left lentiform nucleus. Unfortunately not a candidate for tPA d/t surgery, likely iatrogenic in nature. Upon return from MRI, pt WD R side, still drowsy but not requiring sternal rub. GCS 10, still not gagging on oral airway left in place.  -liberalize BP goal to <160 in light of CVA  -NS @75, avoid hypotension  -q1h neuro checks  2/2 CVA unremarkable         The patient is being Prophylaxed for:  Venous Thromboembolism with: Mechanical or Chemical  Stress Ulcer with: H2B  Ventilator Pneumonia with: none    Activity Orders          Discontinue arterial line starting at 02/02 1046    Turn patient every 2 hours starting at 02/01 1000    Diet Adult Regular (IDDSI Level 7) [facility]; Consistent Carbohydrate: Regular starting at 01/31 2125        No Order     Level III    MARGARET GrubbsC  Neurocritical Care  [facility] - Neuro Critical [facility]

## 2022-02-02 NOTE — SUBJECTIVE & OBJECTIVE
Interval History: Slight improvement in speech, some mumbled responses to questions, withdraws to noxious stimuli on R. Mother at bedside. Patient tolerating crushed medications in applesauce.     Current Facility-Administered Medications   Medication Dose Route Frequency Provider Last Rate Last Admin    0.9%  NaCl infusion   Intravenous Continuous Chichi Lopez PA-C   Stopped at 02/01/22 0625    acetaminophen tablet 650 mg  650 mg Oral Q4H PRN Anderson Ac MD        calcium gluconate 1 g in dextrose 5 % 100 mL IVPB  1 g Intravenous PRN Chichi Lopez PA-C        calcium gluconate 2 g in dextrose 5 % 100 mL IVPB  2 g Intravenous PRN Chichi Lopez PA-C        calcium gluconate 3 g in dextrose 5 % 100 mL IVPB  3 g Intravenous PRN Chichi Lopez PA-C        ceFAZolin 2 gram in dextrose 5% 100 mL IVPB (premix)  2 g Intravenous Q8H Anderson Ac MD   Stopped at 02/02/22 0629    dexamethasone injection 4 mg  4 mg Intravenous Q6H Anderson Ac MD   4 mg at 02/02/22 1238    dextrose 50% injection 12.5 g  12.5 g Intravenous PRN Chichi Lopez PA-C        dextrose 50% injection 25 g  25 g Intravenous PRN Chichi Lopez PA-C        enoxaparin injection 40 mg  40 mg Subcutaneous Daily Ingrid Arnold PA-C        famotidine tablet 20 mg  20 mg Oral BID Anderson Ac MD   20 mg at 02/02/22 0900    glucagon (human recombinant) injection 1 mg  1 mg Intramuscular PRN Chichi Lopez PA-C        glucose chewable tablet 16 g  16 g Oral PRN Chichi Lopez PA-C        glucose chewable tablet 24 g  24 g Oral PRN Chichi Lopez PA-C        hydrALAZINE injection 20 mg  20 mg Intravenous Q6H PRN Ingrid Arnold PA-C        HYDROcodone-acetaminophen 5-325 mg per tablet 1 tablet  1 tablet Oral Q4H PRN Anderson Ac MD        insulin aspart U-100 pen 0-5 Units  0-5 Units Subcutaneous QID (AC + HS) PRN Chichi Lopez PA-C        labetaloL injection  10 mg  10 mg Intravenous Q4H PRN Ingrid Arnold PA-C        levETIRAcetam injection 1,000 mg  1,000 mg Intravenous Q12H Chichi Lopez PA-C   1,000 mg at 02/02/22 0951    magnesium sulfate 2g in water 50mL IVPB (premix)  2 g Intravenous PRN Chichi Lopez PA-C        magnesium sulfate 2g in water 50mL IVPB (premix)  4 g Intravenous PRN Chichi Lopez PA-C        modafiniL tablet 100 mg  100 mg Oral QAM Ingrid Arnold PA-C   100 mg at 02/02/22 1238    mupirocin 2 % ointment   Nasal BID Anderson Ac MD   Given at 02/02/22 0953    ondansetron disintegrating tablet 8 mg  8 mg Oral Q8H PRN Anderson Ac MD        ondansetron injection 4 mg  4 mg Intravenous Q12H PRN Anderson Ac MD        potassium chloride 10 mEq in 100 mL IVPB  40 mEq Intravenous PRN Chichi Lopez PA-C   Stopped at 02/02/22 0557    And    potassium chloride 10 mEq in 100 mL IVPB  60 mEq Intravenous PRN Chichi Lopez PA-C        And    potassium chloride 10 mEq in 100 mL IVPB  80 mEq Intravenous PRN Chichi Lopez PA-C        potassium, sodium phosphates 280-160-250 mg packet 2 packet  2 packet Oral PRN Chichi Lopez PA-C        potassium, sodium phosphates 280-160-250 mg packet 2 packet  2 packet Oral PRN Chichi Lopez PA-C        potassium, sodium phosphates 280-160-250 mg packet 2 packet  2 packet Oral PRN Chichi Lopez PA-C        senna-docusate 8.6-50 mg per tablet 1 tablet  1 tablet Oral BID Luis San MD        topiramate tablet 200 mg  200 mg Oral BID Anderson Ac MD   200 mg at 02/02/22 0946    white petrolatum-mineral oil (LUBIFRESH P.M.) ophthalmic ointment   Right Eye BID Anderson Ac MD         Continuous Infusions:   sodium chloride 0.9% Stopped (02/01/22 0625)       Review of Systems   Unable to perform ROS: Other     Objective:     Vital Signs (Most Recent):  Temp: 98.2 °F (36.8 °C) (02/02/22 0705)  Pulse: 80 (02/02/22  0905)  Resp: 17 (02/02/22 0905)  BP: 124/73 (02/02/22 0905)  SpO2: 100 % (02/02/22 0905) Vital Signs (24h Range):  Temp:  [97.8 °F (36.6 °C)-98.2 °F (36.8 °C)] 98.2 °F (36.8 °C)  Pulse:  [69-92] 80  Resp:  [13-28] 17  SpO2:  [92 %-100 %] 100 %  BP: (117-142)/(68-86) 124/73  Arterial Line BP: ()/() 132/119     Weight: 118.8 kg (262 lb)  Body mass index is 43.6 kg/m².    Physical Exam  Vitals and nursing note reviewed.   Constitutional:       Appearance: She is not toxic-appearing or diaphoretic.   HENT:      Head:      Comments: Surgical dressing in place  Eyes:      General: No scleral icterus.     Conjunctiva/sclera: Conjunctivae normal.      Pupils: Pupils are equal, round, and reactive to light.   Cardiovascular:      Rate and Rhythm: Normal rate.   Pulmonary:      Effort: Pulmonary effort is normal. No respiratory distress.   Abdominal:      General: There is no distension.      Palpations: Abdomen is soft.   Musculoskeletal:         General: No deformity or signs of injury.      Cervical back: Neck supple. No rigidity.   Skin:     General: Skin is warm and dry.   Neurological:      Mental Status: She is alert.   Psychiatric:      Comments: Unable to assess         NEUROLOGICAL EXAMINATION:     MENTAL STATUS        Alert, tracks examiner       CRANIAL NERVES     CN III, IV, VI   Pupils are equal, round, and reactive to light.    CN VII   Right facial weakness: central       Expressive aphasia, some mumbled speech in response to questions       MOTOR EXAM        Withdraws to noxious stimuli on right  Spontaneous movements on left, follows commands       Significant Labs: All pertinent lab results from the past 24 hours have been reviewed.    Significant Studies: I have reviewed all pertinent imaging results/findings within the past 24 hours.

## 2022-02-02 NOTE — ASSESSMENT & PLAN NOTE
- Noted on MRI post-operatively, patient with expressive aphasia, right hemiparesis  - BP management per NCC, goal <160 in setting of recent infarct  - q1h neurochecks, management per NCC/Vascular Neuro

## 2022-02-02 NOTE — PROGRESS NOTES
[facility] - Neuro Critical Care  Neurosurgery  Progress Note    Subjective:     History of Present Illness: Pt is a 31F with intractable epilepsy who presents on 1/31 for elective left anterior temporal lobectomy.      Post-Op Info:  Procedure(s) (LRB):  LEFT CRANIOTOMY FOR TEMPORAL LOBECTOMY WITH BRAINLAB (Left)   2 Days Post-Op     Interval History: POD2 naeon, exam stable with improvement in speech. Continue HV drain, Continue ICU today.    Medications:  Continuous Infusions:   sodium chloride 0.9% Stopped (02/01/22 0625)     Scheduled Meds:   ceFAZolin (ANCEF) IVPB  2 g Intravenous Q8H    dexamethasone  4 mg Intravenous Q6H    enoxaparin  40 mg Subcutaneous Daily    famotidine  20 mg Oral BID    levetiracetam IV  1,000 mg Intravenous Q12H    modafiniL  100 mg Oral QAM    mupirocin   Nasal BID    topiramate  200 mg Oral BID    white petrolatum-mineral oiL   Right Eye BID     PRN Meds:acetaminophen, calcium gluconate IVPB, calcium gluconate IVPB, calcium gluconate IVPB, dextrose 50%, dextrose 50%, glucagon (human recombinant), glucose, glucose, hydrALAZINE, HYDROcodone-acetaminophen, insulin aspart U-100, labetaloL, magnesium sulfate IVPB, magnesium sulfate IVPB, ondansetron, ondansetron, potassium chloride in water **AND** potassium chloride in water **AND** potassium chloride in water, potassium, sodium phosphates, potassium, sodium phosphates, potassium, sodium phosphates     Review of Systems  Objective:     Weight: 118.8 kg (262 lb)  Body mass index is 43.6 kg/m².  Vital Signs (Most Recent):  Temp: 98.2 °F (36.8 °C) (02/02/22 0705)  Pulse: 80 (02/02/22 0905)  Resp: 17 (02/02/22 0905)  BP: 124/73 (02/02/22 0905)  SpO2: 100 % (02/02/22 0905) Vital Signs (24h Range):  Temp:  [97.8 °F (36.6 °C)-98.2 °F (36.8 °C)] 98.2 °F (36.8 °C)  Pulse:  [69-92] 80  Resp:  [13-28] 17  SpO2:  [92 %-100 %] 100 %  BP: (117-142)/(68-86) 124/73  Arterial Line BP: ()/() 132/119                           Closed/Suction Drain 01/31/22 1842 Left Other (Comment) Accordion 10 Fr. (Active)   Site Description Unable to view 02/01/22 0401   Dressing Type Gauze 02/01/22 0401   Dressing Status Clean;Dry;Intact 02/01/22 0401   Dressing Intervention Integrity maintained 02/01/22 0401   Drainage Bloody 02/01/22 0401   Status To bulb suction 02/01/22 0401   Output (mL) 110 mL 02/01/22 0601       Physical Exam    Neurosurgery Physical Exam  Awake, expressive aphasia, disoriented  PERRL, right facial droop  Follows commands briskly left side full strength  Localizing RUE 3/5, WD RLE    Significant Labs:  Recent Labs   Lab 01/31/22 2025 01/31/22 2234 02/01/22 0345 02/02/22  0042   *  --  123* 95     --  138 139   K 3.9  --  3.8 3.9   *  --  117* 115*   CO2 15*  --  15* 14*   BUN 9  --  7 7   CREATININE 0.6  --  0.7 0.6   CALCIUM 7.5*  --  7.9* 7.9*   MG  --  2.0 2.1 2.1     Recent Labs   Lab 01/31/22 2025 02/01/22 0345 02/02/22  0042   WBC 11.60 13.02* 13.18*   HGB 11.7* 11.7* 10.7*   HCT 35.8* 36.5* 33.8*    242 214     No results for input(s): LABPT, INR, APTT in the last 48 hours.  Microbiology Results (last 7 days)     ** No results found for the last 168 hours. **        All pertinent labs from the last 24 hours have been reviewed.    Significant Diagnostics:  I have reviewed all pertinent imaging results/findings within the past 24 hours.  I have reviewed and interpreted all pertinent imaging results/findings within the past 24 hours.Interval History: 2/1 Patient POD s/p anterior temporal lobectomy. Post op MRI with acute infarct adjacent and deep to surgical bed, patient with new right hemiparesis and aphasia. Continue AEDs, steroids, continue to monitor exam and close montoring in ICU today    Medications:  Continuous Infusions:   sodium chloride 0.9% Stopped (02/01/22 0701)     Scheduled Meds:   ceFAZolin (ANCEF) IVPB  2 g Intravenous Q8H    dexamethasone  4 mg Intravenous Q6H     famotidine  20 mg Oral BID    levetiracetam IV  1,000 mg Intravenous Q12H    mupirocin   Nasal BID    topiramate  200 mg Oral BID     PRN Meds:acetaminophen, calcium gluconate IVPB, calcium gluconate IVPB, calcium gluconate IVPB, dextrose 50%, dextrose 50%, glucagon (human recombinant), glucose, glucose, hydrALAZINE, HYDROcodone-acetaminophen, insulin aspart U-100, labetaloL, magnesium sulfate IVPB, magnesium sulfate IVPB, ondansetron, ondansetron, potassium chloride in water **AND** potassium chloride in water **AND** potassium chloride in water, potassium, sodium phosphates, potassium, sodium phosphates, potassium, sodium phosphates     Review of Systems    Objective:     Weight: 119.2 kg (262 lb 12.6 oz)  Body mass index is 43.73 kg/m².  Vital Signs (Most Recent):  Temp: 98 °F (36.7 °C) (02/01/22 0301)  Pulse: 71 (02/01/22 1020)  Resp: 18 (02/01/22 1020)  BP: 126/64 (02/01/22 0701)  SpO2: 100 % (02/01/22 1020) Vital Signs (24h Range):  Temp:  [96.6 °F (35.9 °C)-98 °F (36.7 °C)] 98 °F (36.7 °C)  Pulse:  [59-78] 71  Resp:  [15-23] 18  SpO2:  [97 %-100 %] 100 %  BP: (111-129)/(59-70) 126/64  Arterial Line BP: (105-142)/(48-73) 142/72                          Closed/Suction Drain 01/31/22 1842 Left Other (Comment) Accordion 10 Fr. (Active)   Site Description Unable to view 02/01/22 0401   Dressing Type Gauze 02/01/22 0401   Dressing Status Clean;Dry;Intact 02/01/22 0401   Dressing Intervention Integrity maintained 02/01/22 0401   Drainage Bloody 02/01/22 0401   Status To bulb suction 02/01/22 0401   Output (mL) 110 mL 02/01/22 0601       Physical Exam      Neurosurgery Physical Exam    Awake, expressive aphasia, disoriented  PERRL, right facial droop  Follows commands briskly left side full strength  Localizing RUE 3/5, WD RLE    Significant Labs:  Recent Labs   Lab 01/31/22 2025 01/31/22 2234 02/01/22  0345   *  --  123*     --  138   K 3.9  --  3.8   *  --  117*   CO2 15*  --  15*   BUN 9   --  7   CREATININE 0.6  --  0.7   CALCIUM 7.5*  --  7.9*   MG  --  2.0 2.1     Recent Labs   Lab 01/31/22  1759 01/31/22 2025 02/01/22  0345   WBC  --  11.60 13.02*   HGB  --  11.7* 11.7*   HCT 31* 35.8* 36.5*   PLT  --  261 242     No results for input(s): LABPT, INR, APTT in the last 48 hours.  Microbiology Results (last 7 days)     ** No results found for the last 168 hours. **        All pertinent labs from the last 24 hours have been reviewed.    Significant Diagnostics:  I have reviewed all pertinent imaging results/findings within the past 24 hours.  I have reviewed and interpreted all pertinent imaging results/findings within the past 24 hours.    Assessment/Plan:     * Localization-related (focal) (partial) symptomatic epilepsy and epileptic syndromes with complex partial seizures, intractable, without status epilepticus  Pt is 31F with temporal epilepsy who is now s/p left craniotomy for anterior temporal lobectomy.    2/1 Patient POD s/p anterior temporal lobectomy. Post op MRI with acute infarct adjacent and deep to surgical bed, patient with new right hemiparesis and aphasia. Continue AEDs, steroids, continue to monitor exam and close montoring in ICU today    Plan:  Admitted to [facility], Q1h neurochecks in post acute period  -- All diagnostics reviewed   -- CTH post op satisfactory   -- MRI brain 2/1 with expected changes as well as acute infarct deep structures adjacent to surgical bed.  -- HV in place, abx while in  -- Continue dex 4q6, GI PPx while on steroids  -- Continue home AED keppra and topiramate  -- Aggressive PTOT SLP post op  -- SLP today, ADAT  -- Okay for SQH  -- Bowel regimen  Please do not hesitate to contact neurosurgery on call for any questions or cocnerns        Ian Vega MD  Neurosurgery  [facility]

## 2022-02-02 NOTE — PLAN OF CARE
Problem: Occupational Therapy Goal  Goal: Occupational Therapy Goal  Description: Goals to be met by: 2/16/2022    Patient will increase functional independence with ADLs by performing:    UE Dressing with Minimal Assistance.  Grooming while EOB with Minimal Assistance.  Sitting at edge of bed 10 minutes with Contact Guard Assistance.  Rolling to Left with Minimal Assistance.   Supine to sit with Minimal Assistance.    Outcome: Ongoing, Progressing   Pts goals are set.

## 2022-02-02 NOTE — CARE UPDATE
CTA H/N negative for vascular abnormality. Please refer to consult note from 2/1/22 for further details. Stroke team has no further recommendations and will sign off at this time. Thank you for your consult. Please contact our team with additional questions or concerns.

## 2022-02-02 NOTE — CONSULTS
"  [facility]  Adult Nutrition  Consult Note    SUMMARY     Recommendations    1. Continue Regular diet, texture per SLP   - Diabetic restrictions prn for hyperglycemia >200 mg/dL     2. If PO intake <50%, add Boost Glucose Control BID     3. Please assist with all meals prn and encourage >50% meal intake     4. RD to monitor    Goals: Pt will meet and tolerate >65% EEN/EPN by RD f/u  Nutrition Goal Status: new    Communication of RD Recs:  (POC)    Assessment and Plan    Nutrition Problem  Excessive energy intake    Related to (etiology):   Food- and nutrition-related knowledge deficit    Signs and Symptoms (as evidenced by):   BMI 43, pt denies prior education     Interventions (treatment strategy):  Collaboration with other providers  Nutrition education; when appropriate  General/healthy diet    Nutrition Diagnosis Status:   New     Reason for Assessment    Reason For Assessment: consult (evaluate and treat)  Diagnosis:  (epilepsy)  Relevant Medical History: Seizures  Interdisciplinary Rounds: did not attend    General Information Comments: Pt presents s/p craniotomy 1/31 r/t chronic seizures. Pt's status improved this am with improvements in speech. Unsure of appetite or diet PTA. SLP cleared pt for regular diet. Per chart, pt's UBW ~250-260#; no wt loss noted. Visual NFPE completed 2/2; pt appears obese with no s/s of malnturition.    Nutrition Discharge Planning: Adequate intake    Nutrition Risk Screen    Nutrition Risk Screen: no indicators present    Nutrition/Diet History    Spiritual, Cultural Beliefs, Orthodox Practices, Values that Affect Care: no  Food Allergies: NKFA  Factors Affecting Nutritional Intake: None identified at this time    Anthropometrics    Temp: 98.2 °F (36.8 °C)  Height Method: Estimated  Height: 5' 5" (165.1 cm)  Height (inches): 65 in  Weight Method: Bed Scale  Weight: 118.8 kg (262 lb)  Weight (lb): 262 lb  Ideal Body Weight (IBW), Female: 125 lb  % Ideal Body " Weight, Female (lb): 209.6 %  BMI (Calculated): 43.6  BMI Grade: greater than 40 - morbid obesity  Usual Body Weight (UBW), k.64 kg  % Usual Body Weight: 104.8       Lab/Procedures/Meds    Pertinent Labs Reviewed: reviewed  Pertinent Labs Comments: Phos 2.2, Alb 3.0, ALT 9  Pertinent Medications Reviewed: reviewed  Pertinent Medications Comments: famotidine, senna, IV fluids    Estimated/Assessed Needs    Weight Used For Calorie Calculations: 118.8 kg (262 lb)  Energy Calorie Requirements (kcal): 1894 kcal/day  Energy Need Method: [place] Jeor (No PAL; obese)  Protein Requirements:  g/day (0.8-1.0 g/kg)  Weight Used For Protein Calculations: 118.8 kg (262 lb)  Fluid Requirements (mL): 1 mL/kcal or per MD  Estimated Fluid Requirement Method: RDA Method  RDA Method (mL): 1894       Nutrition Prescription Ordered    Current Diet Order: Regular, CHO consistent    Evaluation of Received Nutrient/Fluid Intake    I/O: +913 mL since admit  Energy Calories Required: not meeting needs  Protein Required: not meeting needs  Comments: LBM   Tolerance: tolerating  % Intake of Estimated Energy Needs: 0 - 25 %; no PO intake yet  % Meal Intake: 0 - 25 %    Nutrition Risk    Level of Risk/Frequency of Follow-up: low     Monitor and Evaluation    Food and Nutrient Intake: energy intake,food and beverage intake  Food and Nutrient Adminstration: diet order  Knowledge/Beliefs/Attitudes: food and nutrition knowledge/skill  Physical Activity and Function: nutrition-related ADLs and IADLs  Anthropometric Measurements: weight change,weight  Biochemical Data, Medical Tests and Procedures: glucose/endocrine profile,gastrointestinal profile,electrolyte and renal panel,inflammatory profile,lipid profile  Nutrition-Focused Physical Findings: overall appearance,extremities, muscles and bones     Nutrition Follow-Up    RD Follow-up?: Yes

## 2022-02-02 NOTE — PT/OT/SLP EVAL
"Speech Language Pathology Evaluation  Cognitive Communication    Patient Name:  Carolyn Mccullough   MRN:  [id number]  Admitting Diagnosis: Localization-related (focal) (partial) symptomatic epilepsy and epileptic syndromes with complex partial seizures, intractable, without status epilepticus    Recommendations:     Recommendations:                General Recommendations:  Dysphagia therapy and Speech/language therapy  Diet recommendations:  NPO, NPO   Aspiration Precautions: Strict aspiration precautions thin and puree sparingly   General Precautions: Standard, aspiration,NPO,fall  Communication strategies:  yes/no questions only and provide increased time to answer    History:     Past Medical History:   Diagnosis Date    Seizures        Past Surgical History:   Procedure Laterality Date    BRAIN SURGERY      [facility]G left [facility]     SECTION      SURGICAL REMOVAL OF LOBE OF BRAIN Left 2022    Procedure: LEFT CRANIOTOMY FOR TEMPORAL LOBECTOMY WITH BRAINLAB;  Surgeon: Mayra Iraheta MD;  Location: [facility] OR [address] [place] [address];  Service: Neurosurgery;  Laterality: Left;  TORONTO III, ASA III, BLOOD TYPE &CROSS 2UNITS, HEADREST LUCIANO, SUPINE POSITION, BRAINLAB, REGULAR BED    TUBAL LIGATION      [place] 4 years ago       Social History: Patient lives with mother    Prior diet: regular/thin.      Subjective     Awake/alert  Mother at bedside   " Carolyn" pt elicited when asked name with 60% intelligibility     Pain/Comfort:  · Pain Rating 1: 0/10  · Pain Rating Post-Intervention 1: 0/10    Respiratory Status: Room air    Objective:   Cognitive Status:    Unable to assess 2/2 aphasia  Orientation Person      Receptive Language:   Comprehension:   Questions Complex yes/no 100% via head nod   Commands  One step 2/6 accy with repetition and cues     Pragmatics:    flat affect     Expressive Language:  Verbal:    limited verbalizations elicited.  Pt was able to count to 5 with max cues, she was " unable to name objects with max cues.   Nonverbal:   Pt able to nod head with increased time provided       Motor Speech:  Dysarthria severe. pt was ~40% intelligible in known context     Voice:   low intensity    Visual-Spatial:  TBA    Reading:   TBA     Written Expression:   TBA    Treatment: Pt repositioned upright in bed for ongoing swallow assessment. Pt tolerated thin liquids x8 via cup/straw with delayed swallow initiation and no overt clinical s/s of airway compromise. Anterior loss noted x1 with cup 2/2 decreased seal with R weakness. Pt with delayed swallow initiation and slow oral transit with puree x2. Recommend continue NPO diet at this time, pt may have thin liquids and puree for pleasure sparingly.     Assessment:   Carolyn Mccullough is a 31 y.o. female with an SLP diagnosis of Dysphagia.    Goals:   Multidisciplinary Problems     SLP Goals        Problem: SLP Goal    Goal Priority Disciplines Outcome   SLP Goal     SLP Ongoing, Progressing   Description: Goals due 2/8  1.  Pt. Will participate in ongoing assessment of swallow  2.  Pt. Will participate in ongoing speech language evaluation  3. Pt will answer yes.no questions with 70% accy and mod cues   4. Pt will follow single step commands with 50% accy and max cues  5. Pt will utilize speech strategies with mod cues to increase intelligibility  6. Pt will complete rote speech tasks with 50% accy and mod cues                    Plan:   · Patient to be seen:  4 x/week   · Plan of Care expires:  03/01/22  · Plan of Care reviewed with:  patient,mother   · SLP Follow-Up:  Yes       Discharge recommendations:  Discharge Facility/Level of Care Needs: rehabilitation facility     Time Tracking:   SLP Treatment Date:   02/02/22  Speech Start Time:  0818  Speech Stop Time:  0835     Speech Total Time (min):  17 min    Billable Minutes: Eval 10  and Treatment Swallowing Dysfunction 7    02/02/2022

## 2022-02-03 ENCOUNTER — SOCIAL WORK (OUTPATIENT)
Dept: NEUROLOGY | Facility: CLINIC | Age: 32
End: 2022-02-03
Payer: MEDICAID

## 2022-02-03 LAB
ALBUMIN SERPL BCP-MCNC: 2.9 G/DL (ref 3.5–5.2)
ALP SERPL-CCNC: 56 U/L (ref 55–135)
ALT SERPL W/O P-5'-P-CCNC: 7 U/L (ref 10–44)
ANION GAP SERPL CALC-SCNC: 6 MMOL/L (ref 8–16)
ANISOCYTOSIS BLD QL SMEAR: SLIGHT
AST SERPL-CCNC: 15 U/L (ref 10–40)
BASOPHILS # BLD AUTO: 0.02 K/UL (ref 0–0.2)
BASOPHILS NFR BLD: 0.2 % (ref 0–1.9)
BILIRUB SERPL-MCNC: 0.2 MG/DL (ref 0.1–1)
BUN SERPL-MCNC: 9 MG/DL (ref 6–20)
CALCIUM SERPL-MCNC: 8.1 MG/DL (ref 8.7–10.5)
CHLORIDE SERPL-SCNC: 114 MMOL/L (ref 95–110)
CO2 SERPL-SCNC: 16 MMOL/L (ref 23–29)
CREAT SERPL-MCNC: 0.6 MG/DL (ref 0.5–1.4)
DIFFERENTIAL METHOD: ABNORMAL
EOSINOPHIL # BLD AUTO: 0 K/UL (ref 0–0.5)
EOSINOPHIL NFR BLD: 0 % (ref 0–8)
ERYTHROCYTE [DISTWIDTH] IN BLOOD BY AUTOMATED COUNT: 15.3 % (ref 11.5–14.5)
EST. GFR  (AFRICAN AMERICAN): >60 ML/MIN/1.73 M^2
EST. GFR  (NON AFRICAN AMERICAN): >60 ML/MIN/1.73 M^2
GLUCOSE SERPL-MCNC: 120 MG/DL (ref 70–110)
HCT VFR BLD AUTO: 33.5 % (ref 37–48.5)
HGB BLD-MCNC: 10.5 G/DL (ref 12–16)
IMM GRANULOCYTES # BLD AUTO: 0.05 K/UL (ref 0–0.04)
IMM GRANULOCYTES NFR BLD AUTO: 0.5 % (ref 0–0.5)
LYMPHOCYTES # BLD AUTO: 1.9 K/UL (ref 1–4.8)
LYMPHOCYTES NFR BLD: 18.4 % (ref 18–48)
MAGNESIUM SERPL-MCNC: 1.9 MG/DL (ref 1.6–2.6)
MCH RBC QN AUTO: 26.9 PG (ref 27–31)
MCHC RBC AUTO-ENTMCNC: 31.3 G/DL (ref 32–36)
MCV RBC AUTO: 86 FL (ref 82–98)
MONOCYTES # BLD AUTO: 0.6 K/UL (ref 0.3–1)
MONOCYTES NFR BLD: 5.8 % (ref 4–15)
NEUTROPHILS # BLD AUTO: 7.6 K/UL (ref 1.8–7.7)
NEUTROPHILS NFR BLD: 75.1 % (ref 38–73)
NRBC BLD-RTO: 0 /100 WBC
PHOSPHATE SERPL-MCNC: 1.5 MG/DL (ref 2.7–4.5)
PLATELET # BLD AUTO: 202 K/UL (ref 150–450)
PLATELET BLD QL SMEAR: ABNORMAL
PMV BLD AUTO: 10.7 FL (ref 9.2–12.9)
POCT GLUCOSE: 118 MG/DL (ref 70–110)
POCT GLUCOSE: 121 MG/DL (ref 70–110)
POCT GLUCOSE: 127 MG/DL (ref 70–110)
POCT GLUCOSE: 97 MG/DL (ref 70–110)
POTASSIUM SERPL-SCNC: 4 MMOL/L (ref 3.5–5.1)
PROT SERPL-MCNC: 6.1 G/DL (ref 6–8.4)
RBC # BLD AUTO: 3.91 M/UL (ref 4–5.4)
SODIUM SERPL-SCNC: 136 MMOL/L (ref 136–145)
WBC # BLD AUTO: 10.12 K/UL (ref 3.9–12.7)

## 2022-02-03 PROCEDURE — 84100 ASSAY OF PHOSPHORUS: CPT | Performed by: PSYCHIATRY & NEUROLOGY

## 2022-02-03 PROCEDURE — 97112 NEUROMUSCULAR REEDUCATION: CPT | Mod: CQ

## 2022-02-03 PROCEDURE — 97530 THERAPEUTIC ACTIVITIES: CPT

## 2022-02-03 PROCEDURE — 97535 SELF CARE MNGMENT TRAINING: CPT

## 2022-02-03 PROCEDURE — 85025 COMPLETE CBC W/AUTO DIFF WBC: CPT | Performed by: PHYSICIAN ASSISTANT

## 2022-02-03 PROCEDURE — 25000003 PHARM REV CODE 250: Performed by: PSYCHIATRY & NEUROLOGY

## 2022-02-03 PROCEDURE — 25000003 PHARM REV CODE 250: Performed by: STUDENT IN AN ORGANIZED HEALTH CARE EDUCATION/TRAINING PROGRAM

## 2022-02-03 PROCEDURE — 99233 SBSQ HOSP IP/OBS HIGH 50: CPT | Mod: ,,,

## 2022-02-03 PROCEDURE — 25000003 PHARM REV CODE 250: Performed by: PHYSICIAN ASSISTANT

## 2022-02-03 PROCEDURE — 25000003 PHARM REV CODE 250

## 2022-02-03 PROCEDURE — 99233 PR SUBSEQUENT HOSPITAL CARE,LEVL III: ICD-10-PCS | Mod: ,,,

## 2022-02-03 PROCEDURE — 83735 ASSAY OF MAGNESIUM: CPT | Performed by: PSYCHIATRY & NEUROLOGY

## 2022-02-03 PROCEDURE — 51701 INSERT BLADDER CATHETER: CPT

## 2022-02-03 PROCEDURE — 99233 PR SUBSEQUENT HOSPITAL CARE,LEVL III: ICD-10-PCS | Mod: ,,, | Performed by: PSYCHIATRY & NEUROLOGY

## 2022-02-03 PROCEDURE — 97530 THERAPEUTIC ACTIVITIES: CPT | Mod: CQ

## 2022-02-03 PROCEDURE — 94761 N-INVAS EAR/PLS OXIMETRY MLT: CPT

## 2022-02-03 PROCEDURE — 92526 ORAL FUNCTION THERAPY: CPT

## 2022-02-03 PROCEDURE — 51798 US URINE CAPACITY MEASURE: CPT

## 2022-02-03 PROCEDURE — 99233 SBSQ HOSP IP/OBS HIGH 50: CPT | Mod: ,,, | Performed by: PSYCHIATRY & NEUROLOGY

## 2022-02-03 PROCEDURE — 63600175 PHARM REV CODE 636 W HCPCS: Performed by: STUDENT IN AN ORGANIZED HEALTH CARE EDUCATION/TRAINING PROGRAM

## 2022-02-03 PROCEDURE — 11000001 HC ACUTE MED/SURG PRIVATE ROOM

## 2022-02-03 PROCEDURE — 92507 TX SP LANG VOICE COMM INDIV: CPT

## 2022-02-03 PROCEDURE — 63600175 PHARM REV CODE 636 W HCPCS: Performed by: PHYSICIAN ASSISTANT

## 2022-02-03 PROCEDURE — 51702 INSERT TEMP BLADDER CATH: CPT

## 2022-02-03 PROCEDURE — 80053 COMPREHEN METABOLIC PANEL: CPT | Performed by: PHYSICIAN ASSISTANT

## 2022-02-03 RX ORDER — LEVETIRACETAM 100 MG/ML
1000 SOLUTION ORAL 2 TIMES DAILY
Status: DISCONTINUED | OUTPATIENT
Start: 2022-02-03 | End: 2022-02-08 | Stop reason: HOSPADM

## 2022-02-03 RX ORDER — HEPARIN SODIUM 5000 [USP'U]/ML
5000 INJECTION, SOLUTION INTRAVENOUS; SUBCUTANEOUS EVERY 8 HOURS
Status: DISCONTINUED | OUTPATIENT
Start: 2022-02-04 | End: 2022-02-08 | Stop reason: HOSPADM

## 2022-02-03 RX ADMIN — DEXAMETHASONE SODIUM PHOSPHATE 4 MG: 4 INJECTION INTRA-ARTICULAR; INTRALESIONAL; INTRAMUSCULAR; INTRAVENOUS; SOFT TISSUE at 12:02

## 2022-02-03 RX ADMIN — FAMOTIDINE 20 MG: 20 TABLET, FILM COATED ORAL at 09:02

## 2022-02-03 RX ADMIN — DEXAMETHASONE SODIUM PHOSPHATE 4 MG: 4 INJECTION INTRA-ARTICULAR; INTRALESIONAL; INTRAMUSCULAR; INTRAVENOUS; SOFT TISSUE at 05:02

## 2022-02-03 RX ADMIN — MUPIROCIN: 20 OINTMENT TOPICAL at 09:02

## 2022-02-03 RX ADMIN — MINERAL OIL AND WHITE PETROLATUM: 30; 940 OINTMENT OPHTHALMIC at 09:02

## 2022-02-03 RX ADMIN — POTASSIUM & SODIUM PHOSPHATES POWDER PACK 280-160-250 MG 2 PACKET: 280-160-250 PACK at 10:02

## 2022-02-03 RX ADMIN — LEVETIRACETAM 1000 MG: 500 SOLUTION ORAL at 09:02

## 2022-02-03 RX ADMIN — POTASSIUM & SODIUM PHOSPHATES POWDER PACK 280-160-250 MG 2 PACKET: 280-160-250 PACK at 02:02

## 2022-02-03 RX ADMIN — POTASSIUM CHLORIDE 40 MEQ: 7.46 INJECTION, SOLUTION INTRAVENOUS at 05:02

## 2022-02-03 RX ADMIN — TOPIRAMATE 200 MG: 200 TABLET, FILM COATED ORAL at 09:02

## 2022-02-03 RX ADMIN — DOCUSATE SODIUM 50 MG AND SENNOSIDES 8.6 MG 1 TABLET: 8.6; 5 TABLET, FILM COATED ORAL at 09:02

## 2022-02-03 RX ADMIN — MODAFINIL 100 MG: 100 TABLET ORAL at 07:02

## 2022-02-03 RX ADMIN — POTASSIUM & SODIUM PHOSPHATES POWDER PACK 280-160-250 MG 2 PACKET: 280-160-250 PACK at 06:02

## 2022-02-03 NOTE — SUBJECTIVE & OBJECTIVE
Interval History: POD3, NAEON, AFVSS, Exam stable this AM, HV to be removed today, Continue ICU today.    Medications:  Continuous Infusions:    Scheduled Meds:   dexamethasone  4 mg Intravenous Q6H    famotidine  20 mg Oral BID    [START ON 2/4/2022] heparin (porcine)  5,000 Units Subcutaneous Q8H    levetiracetam  1,000 mg Oral BID    modafiniL  100 mg Oral QAM    mupirocin   Nasal BID    senna-docusate 8.6-50 mg  1 tablet Oral BID    topiramate  200 mg Oral BID    white petrolatum-mineral oiL   Right Eye BID     PRN Meds:acetaminophen, calcium gluconate IVPB, calcium gluconate IVPB, calcium gluconate IVPB, dextrose 50%, dextrose 50%, glucagon (human recombinant), glucose, glucose, hydrALAZINE, HYDROcodone-acetaminophen, insulin aspart U-100, labetaloL, magnesium sulfate IVPB, magnesium sulfate IVPB, ondansetron, ondansetron, potassium chloride in water **AND** potassium chloride in water **AND** potassium chloride in water, potassium, sodium phosphates, potassium, sodium phosphates, potassium, sodium phosphates     Review of Systems  Objective:     Weight: 118.8 kg (262 lb)  Body mass index is 43.6 kg/m².  Vital Signs (Most Recent):  Temp: 98 °F (36.7 °C) (02/03/22 0800)  Pulse: 75 (02/03/22 0840)  Resp: (!) 24 (02/03/22 0840)  BP: 118/75 (02/03/22 0800)  SpO2: 100 % (02/03/22 0840) Vital Signs (24h Range):  Temp:  [97.6 °F (36.4 °C)-98.5 °F (36.9 °C)] 98 °F (36.7 °C)  Pulse:  [57-89] 75  Resp:  [14-24] 24  SpO2:  [99 %-100 %] 100 %  BP: (118-140)/(64-93) 118/75  Arterial Line BP: ()/() 97/84                          Closed/Suction Drain 01/31/22 1842 Left Other (Comment) Accordion 10 Fr. (Active)   Site Description Unable to view 02/01/22 0401   Dressing Type Gauze 02/01/22 0401   Dressing Status Clean;Dry;Intact 02/01/22 0401   Dressing Intervention Integrity maintained 02/01/22 0401   Drainage Bloody 02/01/22 0401   Status To bulb suction 02/01/22 0401   Output (mL) 110 mL 02/01/22 0601      Neurosurgery Physical Exam  Awake, expressive aphasia, disoriented  PERRL, right facial droop  Follows commands briskly left side full strength  WD RUE, WD RLE    Incision C/D/I, well-healing    Significant Labs:  Recent Labs   Lab 02/02/22  0042 02/03/22  0029   GLU 95 120*    136   K 3.9 4.0   * 114*   CO2 14* 16*   BUN 7 9   CREATININE 0.6 0.6   CALCIUM 7.9* 8.1*   MG 2.1 1.9     Recent Labs   Lab 02/02/22  0042 02/03/22  0029   WBC 13.18* 10.12   HGB 10.7* 10.5*   HCT 33.8* 33.5*    202     No results for input(s): LABPT, INR, APTT in the last 48 hours.  Microbiology Results (last 7 days)     ** No results found for the last 168 hours. **        All pertinent labs from the last 24 hours have been reviewed.    Significant Diagnostics:  NSGY resident has reviewed all significant diagnostics

## 2022-02-03 NOTE — PROGRESS NOTES
"[facility]  Neurology-Epilepsy  Progress Note    Patient Name: Carolyn Mccullough  MRN: [id number]  Admission Date: 1/31/2022  Hospital Length of Stay: 3 days  Code Status: No Order   Attending Provider: Luis San MD  Primary Care Physician: Fadumo Laughlin MD   Principal Problem:Localization-related (focal) (partial) symptomatic epilepsy and epileptic syndromes with complex partial seizures, intractable, without status epilepticus    Subjective:     Hospital Course:   1/31/21: Left anterior temporal lobectomy completed by NSGY for surgical management of refractory epilepsy. Post-operatively, patient noted to have right hemiparesis, aphasia. MRI brain showing acute infarction involving the left lentiform nucleus. Patient continued on home AED regimen - Levetiracetam 1000 mg BID, Topiramate 200 mg BID, however unable to receive TPM given current NPO status.  2/1/21: Patient continued on Levetiracetam 1000 mg BID, pending NG placement for Topiramate. If unable to obtain enteral access, recommend increasing Levetiracetam to 1500 mg BID until it is established. Vascular Neurology sonsulted,appeciate recommendations.   2/2/21: Patient tolerating PO medications crushed, on home Topiramate 200 mg BID, Levetiracetam 1000 mg BID. Some mumbled speech in response to questions, difficult to understand, at times more clear per nursing. Withdrawal to noxious stimuli on R, toes now down-going on R.       Interval History: Some improvement noted in speech today, able to state she is "alright", follows commands on left.     Current Facility-Administered Medications   Medication Dose Route Frequency Provider Last Rate Last Admin    acetaminophen tablet 650 mg  650 mg Oral Q4H PRN Anderson Ac MD        calcium gluconate 1 g in dextrose 5 % 100 mL IVPB  1 g Intravenous PRN Chichi Lopez PA-C        calcium gluconate 2 g in dextrose 5 % 100 mL IVPB  2 g Intravenous PRN Chichi Lopez PA-C   "      calcium gluconate 3 g in dextrose 5 % 100 mL IVPB  3 g Intravenous PRN Chichi Lopez PA-C        dexamethasone injection 4 mg  4 mg Intravenous Q6H Anderson Ac MD   4 mg at 02/03/22 1226    dextrose 50% injection 12.5 g  12.5 g Intravenous PRN Chichi Lopez PA-C        dextrose 50% injection 25 g  25 g Intravenous PRN Chichi Lopez PA-C        famotidine tablet 20 mg  20 mg Oral BID Anderson Ac MD   20 mg at 02/03/22 0916    glucagon (human recombinant) injection 1 mg  1 mg Intramuscular PRN Chichi Lopez PA-C        glucose chewable tablet 16 g  16 g Oral PRN Chichi Lopez PA-C        glucose chewable tablet 24 g  24 g Oral PRN Chichi Lopez PA-C        [START ON 2/4/2022] heparin (porcine) injection 5,000 Units  5,000 Units Subcutaneous Q8H Kenroy Gonzales MD        hydrALAZINE injection 20 mg  20 mg Intravenous Q6H PRN Ingrid Arnold PA-C        HYDROcodone-acetaminophen 5-325 mg per tablet 1 tablet  1 tablet Oral Q4H PRN Anderson Ac MD        insulin aspart U-100 pen 0-5 Units  0-5 Units Subcutaneous QID (AC + HS) PRN Chichi Lopez PA-C        labetaloL injection 10 mg  10 mg Intravenous Q4H PRN Ingrid Arnold PA-C        levetiracetam 500 mg/5 mL (5 mL) liquid Soln 1,000 mg  1,000 mg Oral BID Luis San MD   1,000 mg at 02/03/22 0916    magnesium sulfate 2g in water 50mL IVPB (premix)  2 g Intravenous PRN Chichi Lopez PA-C        magnesium sulfate 2g in water 50mL IVPB (premix)  4 g Intravenous PRN Chichi Lopez PA-C        modafiniL tablet 100 mg  100 mg Oral QAM Ingrid Arnold PA-C   100 mg at 02/03/22 0719    mupirocin 2 % ointment   Nasal BID Anderson Ac MD   Given at 02/03/22 0916    ondansetron disintegrating tablet 8 mg  8 mg Oral Q8H PRN Anderson Ac MD        ondansetron injection 4 mg  4 mg Intravenous Q12H PRN Anderson Ac MD        potassium chloride 10 mEq in 100 mL IVPB  40  mEq Intravenous PRN Chichi Lopez PA-C 100 mL/hr at 02/03/22 0605 Rate Verify at 02/03/22 0605    And    potassium chloride 10 mEq in 100 mL IVPB  60 mEq Intravenous PRN Chichi Lopez PA-C        And    potassium chloride 10 mEq in 100 mL IVPB  80 mEq Intravenous PRN Chichi Lopez PA-C        potassium, sodium phosphates 280-160-250 mg packet 2 packet  2 packet Oral PRN Chichi Lopez PA-C        potassium, sodium phosphates 280-160-250 mg packet 2 packet  2 packet Oral PRN Chichi Lopez PA-C        potassium, sodium phosphates 280-160-250 mg packet 2 packet  2 packet Oral PRN Chichi Lopez PA-C   2 packet at 02/03/22 1031    senna-docusate 8.6-50 mg per tablet 1 tablet  1 tablet Oral BID Luis San MD   1 tablet at 02/03/22 0916    topiramate tablet 200 mg  200 mg Oral BID Anderson Ac MD   200 mg at 02/03/22 0916    white petrolatum-mineral oil (SYSTANE NIGHTTIME) ophthalmic ointment   Right Eye BID Anderson Ac MD   Given at 02/03/22 0916     Continuous Infusions:    Review of Systems   Unable to perform ROS: Other     Objective:     Vital Signs (Most Recent):  Temp: 98 °F (36.7 °C) (02/03/22 0800)  Pulse: 71 (02/03/22 1008)  Resp: 20 (02/03/22 1008)  BP: 129/89 (02/03/22 1008)  SpO2: 100 % (02/03/22 1008) Vital Signs (24h Range):  Temp:  [97.6 °F (36.4 °C)-98.4 °F (36.9 °C)] 98 °F (36.7 °C)  Pulse:  [57-89] 71  Resp:  [14-24] 20  SpO2:  [98 %-100 %] 100 %  BP: (118-140)/(64-93) 129/89  Arterial Line BP: ()/() 97/84     Weight: 118.8 kg (262 lb)  Body mass index is 43.6 kg/m².    Physical Exam  Vitals and nursing note reviewed.   Constitutional:       Appearance: She is not toxic-appearing or diaphoretic.   HENT:      Head:      Comments: Surgical dressing in place  Eyes:      General: No scleral icterus.     Conjunctiva/sclera: Conjunctivae normal.      Pupils: Pupils are equal, round, and reactive to light.   Cardiovascular:      Rate and Rhythm: Normal  "rate.   Pulmonary:      Effort: Pulmonary effort is normal. No respiratory distress.   Abdominal:      General: There is no distension.      Palpations: Abdomen is soft.   Musculoskeletal:         General: No deformity or signs of injury.      Cervical back: Neck supple. No rigidity.   Skin:     General: Skin is warm and dry.   Neurological:      Mental Status: She is alert.   Psychiatric:      Comments: Unable to assess         NEUROLOGICAL EXAMINATION:     MENTAL STATUS        Alert, tracks examiner       CRANIAL NERVES     CN III, IV, VI   Pupils are equal, round, and reactive to light.    CN VII   Right facial weakness: central       Speech improving, able to state "i'm alright" when asked how she is doing       MOTOR EXAM        Withdraws to noxious stimuli on RLE  Spontaneous movements on left, follows commands     Sensation intact on R      Significant Labs: All pertinent lab results from the past 24 hours have been reviewed.    Significant Studies: I have reviewed all pertinent imaging results/findings within the past 24 hours.    Assessment and Plan:     * Localization-related (focal) (partial) symptomatic epilepsy and epileptic syndromes with complex partial seizures, intractable, without status epilepticus  30 yo female with intractable epilepsy admitted to NICU after left anterior temporal lobectomy for surgical management. Post-op MRI with acute infarct involving left basal ganglia    Recommendations:  - Continue outpatient AED regimen - Levetiracetam 1000 mg BID, Topiramate 200 mg BID  - Vascular Neurology following, CTA H/N completed   - Further imaging/workup per NSGY, Vascular, NCC  - Seizure precautions    Plan of care discussed with NCC team. Will continue to follow peripherally, please call with any questions.    Acute cerebrovascular accident (CVA) of basal ganglia  - Noted on MRI post-operatively, patient with expressive aphasia, right hemiparesis  - BP management per NCC, goal <160 in setting " of recent infarct  - q1h neurochecks, management per NCC/Vascular Neuro    Essential hypertension  - Vitals reviewed, BP management per NCC        VTE Risk Mitigation (From admission, onward)           Ordered     heparin (porcine) injection 5,000 Units  Every 8 hours         02/03/22 0919     Place sequential compression device  Until discontinued         01/31/22 1033     Place ERON hose  Until discontinued         01/31/22 1033                    Melissa Cox PA-C  Neurology-Epilepsy  [facility]  Staff: Dr. Fritz

## 2022-02-03 NOTE — ASSESSMENT & PLAN NOTE
1/31 event: d/t to somnolence, not moving R side, and L fixed gaze, another 1g of keppra (2g total) was given as well as 4mg dex IV, MRI STAT was performed showing acute infarction involving the left lentiform nucleus. Unfortunately not a candidate for tPA d/t surgery, likely iatrogenic in nature. Upon return from MRI, pt WD R side, still drowsy but not requiring sternal rub. GCS 10, still not gagging on oral airway left in place.  -liberalize BP goal to <160 in light of CVA  -NS @75, avoid hypotension  -q1h neuro checks  2/2 CVA unremarkable, stroke team signed off

## 2022-02-03 NOTE — NURSING TRANSFER
Nursing Transfer Note      2/3/2022     Reason patient is being transferred: step down    Transfer To: 905    Transfer via bed    Transfer with cardiac monitoring    Transported by RN    Medicines sent: Mupirocin, systane    Any special needs or follow-up needed: n/a    Chart send with patient: Yes    Notified: Mom at bedside    Patient reassessed at: 02/03/2022, 3:05 PM    Upon arrival to floor: patient oriented to room, call bell in reach and bed in lowest position

## 2022-02-03 NOTE — PLAN OF CARE
Problem: Occupational Therapy Goal  Goal: Occupational Therapy Goal  Description: Goals to be met by: 2/16/2022    Patient will increase functional independence with ADLs by performing:    UE Dressing with Minimal Assistance.  Grooming while EOB with SBA.  Feedomg with Supervision at EOB.  Sitting at edge of bed 10 minutes with Supervision  Rolling to Left with Minimal Assistance.   Supine to sit with Minimal Assistance.  Transfers with Min (A)    Outcome: Ongoing, Progressing     Goals updated.

## 2022-02-03 NOTE — PLAN OF CARE
[facility])  Discharge Reassessment    Primary Care Provider: Fadumo Laughlin MD    Expected Discharge Date: 2/7/2022      Patient stepped down to Neurosurgery today.        Reassessment (most recent)     Discharge Reassessment - 02/03/22 1525        Discharge Reassessment    Assessment Type Discharge Planning Reassessment     Did the patient's condition or plan change since previous assessment? No     Communicated LADI with patient/caregiver Date not available/Unable to determine     Discharge Plan A Rehab     Discharge Plan B Home Health     DME Needed Upon Discharge  none     Discharge Barriers Identified None     Why the patient remains in the hospital Requires continued medical care               Sahara Coburn RN, CCRN-K, [facility]  Neuro-Critical Care   [phone]

## 2022-02-03 NOTE — ASSESSMENT & PLAN NOTE
S/p L craniotomy and L temporal lobectomy for refractory epilepsy with new L BG infarct noted on post-op MRI    -epilepsy, NSGY following  -cont home topamax and keppra   -post op CTH with expected post op changes, nneumocephalus noted anteriorly bilaterally  -post op MRI with new L BG infarct   -SBP <140, prn hydralazine and labetalol  -dex 4 q6h + H2B  -SG HV drain in place with cefazolin ppx, management per NSGY  -q1h neuro, vital checks  -EuNa, EuGly  -PT/OT/SLP as appropriate  -CTA unremarkable, vascular neurology signed off 2/2  -SQh  -Improved alertness with modafinil    2/3 Step down to NSGY

## 2022-02-03 NOTE — PROGRESS NOTES
[facility] Neuro Critical [facility]  Neurosurgery  Progress Note    Subjective:     History of Present Illness: Pt is a 31F with intractable epilepsy who presents on 1/31 for elective left anterior temporal lobectomy.      Post-Op Info:  Procedure(s) (LRB):  LEFT CRANIOTOMY FOR TEMPORAL LOBECTOMY WITH BRAINLAB (Left)   3 Days Post-Op     Interval History: POD3, NAEON, AFVSS, Exam stable this AM, HV to be removed today, Continue ICU today.    Medications:  Continuous Infusions:    Scheduled Meds:   dexamethasone  4 mg Intravenous Q6H    famotidine  20 mg Oral BID    [START ON 2/4/2022] heparin (porcine)  5,000 Units Subcutaneous Q8H    levetiracetam  1,000 mg Oral BID    modafiniL  100 mg Oral QAM    mupirocin   Nasal BID    senna-docusate 8.6-50 mg  1 tablet Oral BID    topiramate  200 mg Oral BID    white petrolatum-mineral oiL   Right Eye BID     PRN Meds:acetaminophen, calcium gluconate IVPB, calcium gluconate IVPB, calcium gluconate IVPB, dextrose 50%, dextrose 50%, glucagon (human recombinant), glucose, glucose, hydrALAZINE, HYDROcodone-acetaminophen, insulin aspart U-100, labetaloL, magnesium sulfate IVPB, magnesium sulfate IVPB, ondansetron, ondansetron, potassium chloride in water **AND** potassium chloride in water **AND** potassium chloride in water, potassium, sodium phosphates, potassium, sodium phosphates, potassium, sodium phosphates     Review of Systems  Objective:     Weight: 118.8 kg (262 lb)  Body mass index is 43.6 kg/m².  Vital Signs (Most Recent):  Temp: 98 °F (36.7 °C) (02/03/22 0800)  Pulse: 75 (02/03/22 0840)  Resp: (!) 24 (02/03/22 0840)  BP: 118/75 (02/03/22 0800)  SpO2: 100 % (02/03/22 0840) Vital Signs (24h Range):  Temp:  [97.6 °F (36.4 °C)-98.5 °F (36.9 °C)] 98 °F (36.7 °C)  Pulse:  [57-89] 75  Resp:  [14-24] 24  SpO2:  [99 %-100 %] 100 %  BP: (118-140)/(64-93) 118/75  Arterial Line BP: ()/() 97/84                          Closed/Suction Drain 01/31/22 1842 Left Other  (Comment) Accordion 10 Fr. (Active)   Site Description Unable to view 02/01/22 0401   Dressing Type Gauze 02/01/22 0401   Dressing Status Clean;Dry;Intact 02/01/22 0401   Dressing Intervention Integrity maintained 02/01/22 0401   Drainage Bloody 02/01/22 0401   Status To bulb suction 02/01/22 0401   Output (mL) 110 mL 02/01/22 0601     Neurosurgery Physical Exam  Awake, expressive aphasia, disoriented  PERRL, right facial droop  Follows commands briskly left side full strength  WD RUE, WD RLE    Incision C/D/I, well-healing    Significant Labs:  Recent Labs   Lab 02/02/22  0042 02/03/22  0029   GLU 95 120*    136   K 3.9 4.0   * 114*   CO2 14* 16*   BUN 7 9   CREATININE 0.6 0.6   CALCIUM 7.9* 8.1*   MG 2.1 1.9     Recent Labs   Lab 02/02/22 0042 02/03/22  0029   WBC 13.18* 10.12   HGB 10.7* 10.5*   HCT 33.8* 33.5*    202     No results for input(s): LABPT, INR, APTT in the last 48 hours.  Microbiology Results (last 7 days)     ** No results found for the last 168 hours. **        All pertinent labs from the last 24 hours have been reviewed.    Significant Diagnostics:  NSGY resident has reviewed all significant diagnostics    Assessment/Plan:     * Localization-related (focal) (partial) symptomatic epilepsy and epileptic syndromes with complex partial seizures, intractable, without status epilepticus  Pt is 31F with temporal epilepsy who is now s/p left craniotomy for anterior temporal lobectomy 2/1:    --Admitted to [facility]   -Q1h neurochecks in post acute period  -- All diagnostics reviewed   -CTH post op satisfactory   -MRI brain 2/1 with expected changes as well as acute infarct deep structures adjacent to surgical bed.  --Continue dex 4q6, GI PPx while on steroids  --Continue home AED keppra and topiramate  --Aggressive PTOT SLP post op  --SLP today, ADAT  --Okay for SQH  --Bowel regimen  --Please contact neurosurgery on call for any questions or cocnerns    Dispo: Ongoing, continue ICU care at  this time        Kenroy Gonzales MD  Neurosurgery  [facility]

## 2022-02-03 NOTE — SUBJECTIVE & OBJECTIVE
Interval History:  Step down to NSGY. Improved alertness with modafinil. Diet started.     Review of Systems   Unable to perform ROS: Acuity of condition     Objective:     Vitals:  Temp: 98 °F (36.7 °C)  Pulse: 71  Rhythm: normal sinus rhythm  BP: 129/89  MAP (mmHg): 101  Resp: 20  SpO2: 100 %  O2 Device (Oxygen Therapy): room air    Temp  Min: 97.6 °F (36.4 °C)  Max: 98.5 °F (36.9 °C)  Pulse  Min: 57  Max: 89  BP  Min: 118/75  Max: 140/93  MAP (mmHg)  Min: 87  Max: 105  Resp  Min: 14  Max: 24  SpO2  Min: 98 %  Max: 100 %    02/02 0701 - 02/03 0700  In: 414.2 [P.O.:237; I.V.:55]  Out: 1400 [Urine:1400]   Unmeasured Output  Stool Occurrence: 0       Physical Exam  Physical Exam  Physical Exam  Constitutional: Well-nourished, well-developed. No obvious distress.  Eyes: Clear conjunctiva. Anicteric. No discharge. Lids without lesions.  HEENT: MMM. Nose, external ears atraumatic. Surgical dressing c/d/i.  Cardio: RRR. Pulses intact. Capillary refill time < 2 seconds.  Respiratory: Clear to auscultation. Regular effort.  GI: Bowel sounds present. Soft, non-distended, non-tender.     Neurologic:  [id number]  R-sided facial droop  Expressive aphasia improving  PERRL, EOMI  Follows commands and MEGAN and AG on left side  WD on RLE  Flaccid on RUE    Medications:  Continuous Scheduleddexamethasone, 4 mg, Q6H  famotidine, 20 mg, BID  [START ON 2/4/2022] heparin (porcine), 5,000 Units, Q8H  levetiracetam, 1,000 mg, BID  modafiniL, 100 mg, QAM  mupirocin, , BID  senna-docusate 8.6-50 mg, 1 tablet, BID  topiramate, 200 mg, BID  white petrolatum-mineral oiL, , BID    PRNacetaminophen, 650 mg, Q4H PRN  calcium gluconate IVPB, 1 g, PRN  calcium gluconate IVPB, 2 g, PRN  calcium gluconate IVPB, 3 g, PRN  dextrose 50%, 12.5 g, PRN  dextrose 50%, 25 g, PRN  glucagon (human recombinant), 1 mg, PRN  glucose, 16 g, PRN  glucose, 24 g, PRN  hydrALAZINE, 20 mg, Q6H PRN  HYDROcodone-acetaminophen, 1 tablet, Q4H PRN  insulin aspart U-100, 0-5  Units, QID (AC + HS) PRN  labetaloL, 10 mg, Q4H PRN  magnesium sulfate IVPB, 2 g, PRN  magnesium sulfate IVPB, 4 g, PRN  ondansetron, 8 mg, Q8H PRN  ondansetron, 4 mg, Q12H PRN  potassium chloride in water, 40 mEq, PRN   And  potassium chloride in water, 60 mEq, PRN   And  potassium chloride in water, 80 mEq, PRN  potassium, sodium phosphates, 2 packet, PRN  potassium, sodium phosphates, 2 packet, PRN  potassium, sodium phosphates, 2 packet, PRN      Today I personally reviewed pertinent medications, lines/drains/airways, imaging, cardiology results, laboratory results, microbiology results, notably:    Diet  Diet Dysphagia Mechanical Soft (IDDSI Level 5)  Diet Dysphagia Mechanical Soft (IDDSI Level 5)

## 2022-02-03 NOTE — ASSESSMENT & PLAN NOTE
30 yo female with intractable epilepsy admitted to NICU after left anterior temporal lobectomy for surgical management. Post-op MRI with acute infarct involving left basal ganglia    Recommendations:  - Continue outpatient AED regimen - Levetiracetam 1000 mg BID, Topiramate 200 mg BID  - Vascular Neurology following, CTA H/N completed   - Further imaging/workup per NSGY, Vascular, NCC  - Seizure precautions    Plan of care discussed with NCC team. Will continue to follow peripherally, please call with any questions.

## 2022-02-03 NOTE — ASSESSMENT & PLAN NOTE
Pt is 31F with temporal epilepsy who is now s/p left craniotomy for anterior temporal lobectomy 2/1:    --Admitted to [facility]   -Q1h neurochecks in post acute period  -- All diagnostics reviewed   -CTH post op satisfactory   -MRI brain 2/1 with expected changes as well as acute infarct deep structures adjacent to surgical bed.  --Continue dex 4q6, GI PPx while on steroids  --Continue home AED keppra and topiramate  --Aggressive PTOT SLP post op  --SLP today, ADAT  --Okay for SQH  --Bowel regimen  --Please contact neurosurgery on call for any questions or cocnerns    Dispo: Ongoing, continue ICU care at this time

## 2022-02-03 NOTE — PLAN OF CARE
02/03/22 1642   Post-Acute Status   Post-Acute Authorization Placement   Post-Acute Placement Status Referrals Sent  (rehab)     SW observed Pt has therapy recs for rehab. Pt stepped down. Sent referrals via [facility]port to check options for this Pt so they can get a preference.     Lashaun Blas, RON  Neurocritical Care   [facility]  [id number]

## 2022-02-03 NOTE — PT/OT/SLP PROGRESS
Physical Therapy Treatment    Patient Name:  Carolyn Mccullough    MRN:  [id number]    Recommendations:     Discharge Recommendations:  rehabilitation facility   Discharge Equipment Recommendations:  (TBD)   Barriers to discharge: impaired functional mobility requiring increased assistance    Assessment:     Carolyn Mccullough is a 31 y.o. female admitted with a medical diagnosis of Localization-related (focal) (partial) symptomatic epilepsy and epileptic syndromes with complex partial seizures, intractable, without status epilepticus.  She presents with the following impairments/functional limitations:  weakness,impaired endurance,impaired self care skills,impaired functional mobilty,gait instability,impaired balance,decreased upper extremity function,decreased lower extremity function,decreased safety awareness,decreased coordination,impaired fine motor,abnormal tone,impaired cognition.  Pt tolerates session well with focus bed mobility, transfers, and EOB balance/endurance. Pt with improved alertness this day with pt attentive and focused to therapist cues. Pts RUE/RLE remain flaccid with no contractions noted with facilitory attempts. Pt stands x 2 trials with HHA and Mod A with fair balance noted once therapist assists with R hemiparetic deficits. Pt will continue to benefit from therapy services to address impairments listed above.     Rehab Prognosis: Good; patient would benefit from acute skilled PT services to address these deficits and reach maximum level of function.    Recent Surgery: Procedure(s) (LRB):  LEFT CRANIOTOMY FOR TEMPORAL LOBECTOMY WITH BRAINLAB (Left) 3 Days Post-Op    Plan:     During this hospitalization, patient to be seen 4 x/week to address the identified rehab impairments via gait training,therapeutic activities,therapeutic exercises,neuromuscular re-education and progress toward the following goals:    · Plan of Care Expires:  03/04/22    Subjective     Chief Complaint: no  c/o  Pain/Comfort:  · Pain Rating 1: 0/10  · Pain Rating Post-Intervention 1: 0/10      Objective:     Communicated with RN prior to session.  Patient found HOB elevated with telemetry,peripheral IV,hemovac,pulse ox (continuous) upon PTA entry to room.     General Precautions: Standard, aspiration,fall   Orthopedic Precautions:N/A   Braces: N/A   Respiratory Status: Room air     Functional Mobility:  · Bed Mobility:     · Rolling Left:  moderate assistance  · Supine to Sit: maximal assistance  · Sit to Supine: maximal assistance  · Transfers:     · Sit to Stand:  moderate assistance with hand-held assist; R knee blocking and assist to R hip to complete extension  · Gait: not attempted d/t R hemiparesis      AM-PAC 6 CLICK MOBILITY  Turning over in bed (including adjusting bedclothes, sheets and blankets)?: 2  Sitting down on and standing up from a chair with arms (e.g., wheelchair, bedside commode, etc.): 2  Moving from lying on back to sitting on the side of the bed?: 2  Moving to and from a bed to a chair (including a wheelchair)?: 2  Need to walk in hospital room?: 2  Climbing 3-5 steps with a railing?: 1  Basic Mobility Total Score: 11       Therapeutic Activities and Exercises:  Pt assisted with functional mobility as noted above.   Sit<>stand x 2 trials with no AD and LHHA. Mod A. R knee blocking d/t hemiparesis.   Pt sits at EOB ~20 minutes with no AD and Min to Mod A. Lateral instability and poor use of LUE to stabilize balance.   RUE weight bearing to facilitate joint approximation and sensory input.   RUE self assisted ROM x 10 reps for overhead raises and punchouts.       Patient left HOB elevated with all lines intact and call button in reach.    GOALS:   Multidisciplinary Problems     Physical Therapy Goals        Problem: Physical Therapy Goal    Goal Priority Disciplines Outcome Goal Variances Interventions   Physical Therapy Goal     PT, PT/OT Ongoing, Progressing     Description: Goals to be met by:  2022     Patient will increase functional independence with mobility by performin. Supine to sit with MInimal Assistance  2. Sit to supine with MInimal Assistance  3. Sit to stand transfer with Minimal Assistance  4. Bed to chair transfer with Minimal Assistance using LRAD  5. Gait  x 5 feet with Minimal Assistance using LRAD.   6. Sitting at edge of bed x10 minutes with Contact Guard Assistance  7. Lower extremity exercise program x15 reps per handout, with assistance as needed                     Time Tracking:     PT Received On: 22  PT Start Time: 833     PT Stop Time: 906  PT Total Time (min): 33 min     Billable Minutes: Therapeutic Activity 18 and Neuromuscular Re-education 15    Treatment Type: Treatment  PT/PTA: PTA     PTA Visit Number: 1     2022

## 2022-02-03 NOTE — SUBJECTIVE & OBJECTIVE
"Interval History: Some improvement noted in speech today, able to state she is "alright", follows commands on left.     Current Facility-Administered Medications   Medication Dose Route Frequency Provider Last Rate Last Admin    acetaminophen tablet 650 mg  650 mg Oral Q4H PRN Anderson Ac MD        calcium gluconate 1 g in dextrose 5 % 100 mL IVPB  1 g Intravenous PRN Chichi Lopez PA-C        calcium gluconate 2 g in dextrose 5 % 100 mL IVPB  2 g Intravenous PRN Chichi Lopez PA-C        calcium gluconate 3 g in dextrose 5 % 100 mL IVPB  3 g Intravenous PRN Chichi Lopez PA-C        dexamethasone injection 4 mg  4 mg Intravenous Q6H Anderson Ac MD   4 mg at 02/03/22 1226    dextrose 50% injection 12.5 g  12.5 g Intravenous PRN Chichi Lopez PA-C        dextrose 50% injection 25 g  25 g Intravenous PRN Chichi Lopez PA-C        famotidine tablet 20 mg  20 mg Oral BID Anderson Ac MD   20 mg at 02/03/22 0916    glucagon (human recombinant) injection 1 mg  1 mg Intramuscular PRN Chichi Lopez PA-C        glucose chewable tablet 16 g  16 g Oral PRN Chichi Lopez PA-C        glucose chewable tablet 24 g  24 g Oral PRN Chichi Lopez PA-C        [START ON 2/4/2022] heparin (porcine) injection 5,000 Units  5,000 Units Subcutaneous Q8H Kenroy Gonzales MD        hydrALAZINE injection 20 mg  20 mg Intravenous Q6H PRN Ingrid Arnold PA-C        HYDROcodone-acetaminophen 5-325 mg per tablet 1 tablet  1 tablet Oral Q4H PRN Anderson Ac MD        insulin aspart U-100 pen 0-5 Units  0-5 Units Subcutaneous QID (AC + HS) PRN Chichi Lopez PA-C        labetaloL injection 10 mg  10 mg Intravenous Q4H PRN Ingrid Arnold PA-C        levetiracetam 500 mg/5 mL (5 mL) liquid Soln 1,000 mg  1,000 mg Oral BID Luis San MD   1,000 mg at 02/03/22 0916    magnesium sulfate 2g in water 50mL IVPB (premix)  2 g Intravenous PRNANCY GRIMES" SAI Lopez        magnesium sulfate 2g in water 50mL IVPB (premix)  4 g Intravenous PRN Chichi Lopez PA-C        modafiniL tablet 100 mg  100 mg Oral QAM Ingrid Arnold PA-C   100 mg at 02/03/22 0719    mupirocin 2 % ointment   Nasal BID Anderson Ac MD   Given at 02/03/22 0916    ondansetron disintegrating tablet 8 mg  8 mg Oral Q8H PRN Anderson Ac MD        ondansetron injection 4 mg  4 mg Intravenous Q12H PRN Anderson Ac MD        potassium chloride 10 mEq in 100 mL IVPB  40 mEq Intravenous PRN Chichi Lopez PA-C 100 mL/hr at 02/03/22 0605 Rate Verify at 02/03/22 0605    And    potassium chloride 10 mEq in 100 mL IVPB  60 mEq Intravenous PRN Chichi Lopez PA-C        And    potassium chloride 10 mEq in 100 mL IVPB  80 mEq Intravenous PRN Chichi Lopez PA-C        potassium, sodium phosphates 280-160-250 mg packet 2 packet  2 packet Oral PRN Chichi Lopez PA-C        potassium, sodium phosphates 280-160-250 mg packet 2 packet  2 packet Oral PRN Chichi Lopez PA-C        potassium, sodium phosphates 280-160-250 mg packet 2 packet  2 packet Oral PRN Chichi Lopez PA-C   2 packet at 02/03/22 1031    senna-docusate 8.6-50 mg per tablet 1 tablet  1 tablet Oral BID Luis San MD   1 tablet at 02/03/22 0916    topiramate tablet 200 mg  200 mg Oral BID Anderson Ac MD   200 mg at 02/03/22 0916    white petrolatum-mineral oil (SYSTANE NIGHTTIME) ophthalmic ointment   Right Eye BID Anderson Ac MD   Given at 02/03/22 0916     Continuous Infusions:    Review of Systems   Unable to perform ROS: Other     Objective:     Vital Signs (Most Recent):  Temp: 98 °F (36.7 °C) (02/03/22 0800)  Pulse: 71 (02/03/22 1008)  Resp: 20 (02/03/22 1008)  BP: 129/89 (02/03/22 1008)  SpO2: 100 % (02/03/22 1008) Vital Signs (24h Range):  Temp:  [97.6 °F (36.4 °C)-98.4 °F (36.9 °C)] 98 °F (36.7 °C)  Pulse:  [57-89] 71  Resp:  [14-24] 20  SpO2:   "[98 %-100 %] 100 %  BP: (118-140)/(64-93) 129/89  Arterial Line BP: ()/() 97/84     Weight: 118.8 kg (262 lb)  Body mass index is 43.6 kg/m².    Physical Exam  Vitals and nursing note reviewed.   Constitutional:       Appearance: She is not toxic-appearing or diaphoretic.   HENT:      Head:      Comments: Surgical dressing in place  Eyes:      General: No scleral icterus.     Conjunctiva/sclera: Conjunctivae normal.      Pupils: Pupils are equal, round, and reactive to light.   Cardiovascular:      Rate and Rhythm: Normal rate.   Pulmonary:      Effort: Pulmonary effort is normal. No respiratory distress.   Abdominal:      General: There is no distension.      Palpations: Abdomen is soft.   Musculoskeletal:         General: No deformity or signs of injury.      Cervical back: Neck supple. No rigidity.   Skin:     General: Skin is warm and dry.   Neurological:      Mental Status: She is alert.   Psychiatric:      Comments: Unable to assess         NEUROLOGICAL EXAMINATION:     MENTAL STATUS        Alert, tracks examiner       CRANIAL NERVES     CN III, IV, VI   Pupils are equal, round, and reactive to light.    CN VII   Right facial weakness: central       Speech improving, able to state "i'm alright" when asked how she is doing       MOTOR EXAM        Withdraws to noxious stimuli on RLE  Spontaneous movements on left, follows commands       Significant Labs: All pertinent lab results from the past 24 hours have been reviewed.    Significant Studies: I have reviewed all pertinent imaging results/findings within the past 24 hours.  "

## 2022-02-03 NOTE — PT/OT/SLP PROGRESS
Occupational Therapy   Treatment    Name: Carolyn Mccullough  MRN: [id number]  Admitting Diagnosis:  Localization-related (focal) (partial) symptomatic epilepsy and epileptic syndromes with complex partial seizures, intractable, without status epilepticus  3 Days Post-Op    Recommendations:     Discharge Recommendations: rehabilitation facility  Discharge Equipment Recommendations:   (TBD on rehab)  Barriers to discharge:   (increased (A) required)    Assessment:     Carolyn Mccullough is a 31 y.o. female with a medical diagnosis of Localization-related (focal) (partial) symptomatic epilepsy and epileptic syndromes with complex partial seizures, intractable, without status epilepticus.  She presents with good participation and motivation. Pt continues to require (A) with all activities & is at risk for falls. Goals remain appropriate. Pt with greatly improved ability to verbalize & with postural control on this date. Performance deficits affecting function are weakness,impaired endurance,impaired self care skills,impaired functional mobilty,impaired balance,decreased coordination,decreased safety awareness,decreased lower extremity function,decreased upper extremity function,decreased ROM,impaired coordination,impaired fine motor,abnormal tone.     Rehab Prognosis:  Good; patient would benefit from acute skilled OT services to address these deficits and reach maximum level of function.       Plan:     Patient to be seen 4 x/week to address the above listed problems via self-care/home management,therapeutic activities,therapeutic exercises,neuromuscular re-education,cognitive retraining  · Plan of Care Expires: 03/02/22  · Plan of Care Reviewed with: patient    Subjective   Pt reported that she was happy to be sitting on the edge of her bed.  Pain/Comfort:  · Pain Rating 1: 0/10  · Pain Rating Post-Intervention 1: 0/10    Objective:     Communicated with: RN prior to session.  Patient found supine with blood pressure  cuff,telemetry,pulse ox (continuous),peripheral IV (no family present) upon OT entry to room.    General Precautions: Standard, fall,aphasia,aspiration   Orthopedic Precautions:N/A   Braces: N/A     Occupational Performance:     Bed Mobility:    · Patient completed Scooting/Bridging with minimum assistance scooting forward on EOB; dependent drawsheet transfer up HOB while supine  · Patient completed Supine to Sit with moderate assistance  · Patient completed Sit to Supine with moderate assistance     Activities of Daily Living:  · Feeding:  minimum assistance with eating while seated EOB with (A) for use of RUE in holding of yogurt cup while using LUE for utensil & for (A) with postural control  · Grooming: contact guard assistance while seated EOB      AMPAC 6 Click ADL: 12    Treatment & Education:  Pt required SBA-Min (A) for postural control while seated EOB.  Provided verbal & physical cues to facilitate postural control. Provided RUE weight bearing while seated EOB. Provided PROM to RUE in all planes available x 10 reps each while supine. Pt had no further questions & when asked whether there were any concerns pt reported none.    Patient left supine with all lines intact, call button in reach, bed alarm on and RN notifiedEducation:      GOALS:   Multidisciplinary Problems     Occupational Therapy Goals        Problem: Occupational Therapy Goal    Goal Priority Disciplines Outcome Interventions   Occupational Therapy Goal     OT, PT/OT Ongoing, Progressing    Description: Goals to be met by: 2/16/2022    Patient will increase functional independence with ADLs by performing:    UE Dressing with Minimal Assistance.  Grooming while EOB with SBA.  [place]edomg with Supervision at EOB.  Sitting at edge of bed 10 minutes with Supervision  Rolling to Left with Minimal Assistance.   Supine to sit with Minimal Assistance.  Transfers with Min (A)                     Time Tracking:     OT Date of Treatment: 02/03/22  OT Start  Time: 1032  OT Stop Time: 1110  OT Total Time (min): 38 min    Billable Minutes:Self Care/Home Management 25  Therapeutic Activity 13    OT/MAXWELL: OT          2/3/2022

## 2022-02-03 NOTE — PT/OT/SLP PROGRESS
"Speech Language Pathology Treatment    Patient Name:  Carolyn Mccullough   MRN:  [id number]  Admitting Diagnosis: Localization-related (focal) (partial) symptomatic epilepsy and epileptic syndromes with complex partial seizures, intractable, without status epilepticus    Recommendations:                 General Recommendations:  Dysphagia therapy, Speech/language therapy and Cognitive-linguistic therapy  Diet recommendations:  Mechanical soft, Liquid Diet Level: Thin   Aspiration Precautions: 1 bite/sip at a time, Alternating bites/sips, Avoid talking while eating, Check for pocketing/oral residue, Eliminate distractions, HOB to 90 degrees, Meds crushed in puree, Small bites/sips and Strict aspiration precautions   General Precautions: Standard, aspiration,fall,aphasia  Communication strategies:  provide increased time to answer    Subjective     "I want to chew."     Pain/Comfort:  · Pain Rating 1: 0/10  · Pain Rating Post-Intervention 1: 0/10    Respiratory Status: Room air    Objective:     Has the patient been evaluated by SLP for swallowing?   Yes  Keep patient NPO? No     Pt seen bedside, alert and cooperative.  Drooling noted from R upon waking though adequate management of secretions noted t/o session.  Continued R weakness noted with decreased labial retraction and tongue deviation to R.  Pt tolerated thin liquids via spoon, cup, and straw with mild delay in initiation of swallow though no overt s/s aspiration.  Delay noted to decrease over course of trials.  Puree tolerated with mildly increased a-p transit though good oral clearance and no overt s/s aspiration.  Mastication of solid was prolonged though again adequate oral clearance.  No overt s/s aspiration.  Pt answering y/n questions t/o trials with good differentiation.  Simple commands followed with 100% accy.  Auto phrases completed with 100% accy.  ADL objects named with 40% accy IND and 80% accy with min A.  Dysarthria evident with decreased breath " support and difficulty coordinating breathing/voicing.  Pt noted to occasional mouth words.  Occasional rushes of speech.  Education provided re: role of SLP, diet recs, swallow precs, s/s aspiration, aphasia, dysarthria, and POC.  Pt indicated understanding.          Assessment:     Carolyn Mccullough is a 31 y.o. female with an SLP diagnosis of Aphasia, Dysphagia and Dysarthria.    Goals:   Multidisciplinary Problems     SLP Goals        Problem: SLP Goal    Goal Priority Disciplines Outcome   SLP Goal     SLP Ongoing, Progressing   Description: Goals due 2/8  1.  Pt. Will participate in ongoing assessment of swallow  2.  Pt. Will participate in ongoing speech language evaluation  3. Pt will answer yes.no questions with 70% accy and mod cues   4. Pt will follow single step commands with 50% accy and max cues  5. Pt will utilize speech strategies with mod cues to increase intelligibility  6. Pt will complete rote speech tasks with 50% accy and mod cues                    Plan:     · Patient to be seen:  4 x/week   · Plan of Care expires:  03/01/22  · Plan of Care reviewed with:  patient   · SLP Follow-Up:  Yes       Discharge recommendations:  rehabilitation facility   Barriers to Discharge:  Level of Skilled Assistance Needed       Time Tracking:     SLP Treatment Date:   02/03/22  Speech Start Time:  0759  Speech Stop Time:  0817     Speech Total Time (min):  18 min    Billable Minutes: Speech Therapy Individual 9 and Treatment Swallowing Dysfunction 9    02/03/2022

## 2022-02-03 NOTE — PLAN OF CARE
[facility] Care Plan    POC reviewed with Carolyn Mccullough and family at 0300. Pt communication skills progressing. Cont to gain strength on R side. Boost and pills crushed in pudding tolerated well. Afebrile overnight. Planning to stepdown and continue aggressive rehab. Will continue to monitor. See below and flowsheets for full assessment and VS info.       Neuro:  Yanet Coma Scale  Best Eye Response: 4-->(E4) spontaneous  Best Motor Response: 6-->(M6) obeys commands  Best Verbal Response: 1-->(V1) none  Singer Coma Scale Score: 11  Assessment Qualifiers: patient not sedated/intubated  Pupil PERRLA: yes     24hr Temp:  [97.6 °F (36.4 °C)-98.5 °F (36.9 °C)]     CV:   Rhythm: normal sinus rhythm  BP goals:   SBP < 140  MAP > 65    Resp:   O2 Device (Oxygen Therapy): room air       Plan: N/A    GI/:     Diet/Nutrition Received: supplemental drink  Last Bowel Movement: 01/30/22  Voiding Characteristics: unable to void    Intake/Output Summary (Last 24 hours) at 2/3/2022 0609  Last data filed at 2/3/2022 0505  Gross per 24 hour   Intake 372 ml   Output 1400 ml   Net -1028 ml     Unmeasured Output  Stool Occurrence: 0    Labs/Accuchecks:  Recent Labs   Lab 02/03/22  0029   WBC 10.12   RBC 3.91*   HGB 10.5*   HCT 33.5*         Recent Labs   Lab 02/03/22  0029      K 4.0   CO2 16*   *   BUN 9   CREATININE 0.6   ALKPHOS 56   ALT 7*   AST 15   BILITOT 0.2    No results for input(s): PROTIME, INR, APTT, HEPANTIXA in the last 168 hours. No results for input(s): CPK, CPKMB, TROPONINI, MB in the last 168 hours.    Electrolytes: Electrolytes replaced  Accuchecks: ACHS    Gtts:   sodium chloride 0.9% Stopped (02/01/22 0625)       LDA/Wounds:  Lines/Drains/Airways       Drain                   Closed/Suction Drain 01/31/22 1842 Left Other (Comment) Accordion 10 Fr. 2 days              Peripheral Intravenous Line                   Peripheral IV - Single Lumen 02/01/22 0757 18 G;Other (Comments) Right Upper Arm 1  day                  Wounds: Yes  Wound care consulted: No

## 2022-02-03 NOTE — PROGRESS NOTES
Epilepsy  met with patient in her room in the ICU.      Patient resting comfortably in her bed and currently alone in her room.      Patient alert and greeted SW when he walked in.  Patient presented with calm mood and congruent demeanor.   Patient also warmly smiled and spoke with .  Patient does present with symptoms of aphasia following her recent surgery.      Epilepsy SW introduced himself and reported he works with the epileptologists and neurologists at [facility].   SW reported he provided support if needed to patient's and family.      SW asked if patient was feeling ok or if there was any discomfort or pain.  Patient nodded no.        SW reported he would continue to follow up to visit with patient and patient's mother.        Joel Rogers [facility]    Clinical  -ALS, Epilepsy, Headache  [facility].  [email or web address]  Phone# [phone]     [phone]  Fax # [phone]

## 2022-02-03 NOTE — PROGRESS NOTES
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 The patient has undergone extensive workup, including sEEG electrode placement at [facility] here in town 10-11/2020 that demonstrated all seizures with a mesial temporal onset. She follows with Chelo Stapleton and Pedro. Pt's mother, Karina, reports that Carolyn experiences about 3 events/month presently. She endorses 2 semiologies: in the first, Carolyn's eyes roll back, she then experiences all-over shaking. She also sometimes has staring with drooling episodes. Seizure onset was in 2009. They initially started while she was asleep, but now happen at all times of day, moreso while awake. Empty sella configuration noted on previous MRI from [facility], regarding IIH symptoms: she endorses occasional headaches, but usually only after seizures. There is no headache associated with exertional activities otherwise. She had an allergic reaction to CT contrast and also to tegretol. Pt takes topiramate 200mg bid and keppra 1g bid. Pt admitted to [facility] for monitoring and management of s/p L craniotomy with L temporal lobectomy for intractable left temporal lobe epilepsy.      Hospital Course: 02/01/2022: New acute BG infarct noted on post-op CT. Stroke Consult. Slight improvement of exam throughout the day.   02/02/2022: CTA overnight unremarkable. Qd modafinil.   02/03/2022: Step down to NSGY. Improved alertness with modafinil. Diet started.       Interval History:  Step down to NSGY. Improved alertness with modafinil. Diet  started.     Review of Systems   Unable to perform ROS: Acuity of condition     Objective:     Vitals:  Temp: 98 °F (36.7 °C)  Pulse: 71  Rhythm: normal sinus rhythm  BP: 129/89  MAP (mmHg): 101  Resp: 20  SpO2: 100 %  O2 Device (Oxygen Therapy): room air    Temp  Min: 97.6 °F (36.4 °C)  Max: 98.5 °F (36.9 °C)  Pulse  Min: 57  Max: 89  BP  Min: 118/75  Max: 140/93  MAP (mmHg)  Min: 87  Max: 105  Resp  Min: 14  Max: 24  SpO2  Min: 98 %  Max: 100 %    02/02 0701 - 02/03 0700  In: 414.2 [P.O.:237; I.V.:55]  Out: 1400 [Urine:1400]   Unmeasured Output  Stool Occurrence: 0       Physical Exam  Physical Exam  Physical Exam  Constitutional: Well-nourished, well-developed. No obvious distress.  Eyes: Clear conjunctiva. Anicteric. No discharge. Lids without lesions.  HEENT: MMM. Nose, external ears atraumatic. Surgical dressing c/d/i.  Cardio: RRR. Pulses intact. Capillary refill time < 2 seconds.  Respiratory: Clear to auscultation. Regular effort.  GI: Bowel sounds present. Soft, non-distended, non-tender.     Neurologic:  E4V1M6  R-sided facial droop  Expressive aphasia improving  PERRL, EOMI  Follows commands and MEGAN and AG on left side  WD on RLE  Flaccid on RUE    Medications:  Continuous Scheduleddexamethasone, 4 mg, Q6H  famotidine, 20 mg, BID  [START ON 2/4/2022] heparin (porcine), 5,000 Units, Q8H  levetiracetam, 1,000 mg, BID  modafiniL, 100 mg, QAM  mupirocin, , BID  senna-docusate 8.6-50 mg, 1 tablet, BID  topiramate, 200 mg, BID  white petrolatum-mineral oiL, , BID    PRNacetaminophen, 650 mg, Q4H PRN  calcium gluconate IVPB, 1 g, PRN  calcium gluconate IVPB, 2 g, PRN  calcium gluconate IVPB, 3 g, PRN  dextrose 50%, 12.5 g, PRN  dextrose 50%, 25 g, PRN  glucagon (human recombinant), 1 mg, PRN  glucose, 16 g, PRN  glucose, 24 g, PRN  hydrALAZINE, 20 mg, Q6H PRN  HYDROcodone-acetaminophen, 1 tablet, Q4H PRN  insulin aspart U-100, 0-5 Units, QID (AC + HS) PRN  labetaloL, 10 mg, Q4H PRN  magnesium sulfate IVPB, 2  g, PRN  magnesium sulfate IVPB, 4 g, PRN  ondansetron, 8 mg, Q8H PRN  ondansetron, 4 mg, Q12H PRN  potassium chloride in water, 40 mEq, PRN   And  potassium chloride in water, 60 mEq, PRN   And  potassium chloride in water, 80 mEq, PRN  potassium, sodium phosphates, 2 packet, PRN  potassium, sodium phosphates, 2 packet, PRN  potassium, sodium phosphates, 2 packet, PRN      Today I personally reviewed pertinent medications, lines/drains/airways, imaging, cardiology results, laboratory results, microbiology results, notably:    Diet  Diet Dysphagia Mechanical Soft (IDDSI Level 5)  Diet Dysphagia Mechanical Soft (IDDSI Level 5)        Assessment/Plan:     Neuro  * Localization-related (focal) (partial) symptomatic epilepsy and epileptic syndromes with complex partial seizures, intractable, without status epilepticus  S/p L craniotomy and L temporal lobectomy for refractory epilepsy with new L BG infarct noted on post-op MRI    -epilepsy, NSGY following  -cont home topamax and keppra   -post op CTH with expected post op changes, nneumocephalus noted anteriorly bilaterally  -post op MRI with new L BG infarct   -SBP <140, prn hydralazine and labetalol  -dex 4 q6h + H2B  -SG HV drain in place with cefazolin ppx, management per NSGY  -q1h neuro, vital checks  -EuNa, EuGly  -PT/OT/SLP as appropriate  -CTA unremarkable, vascular neurology signed off 2/2  -SQh  -Improved alertness with modafinil    2/3 Step down to NSGY      Derm  DRESS syndrome  2016    Cardiac/Vascular  Essential hypertension  SBP <160 post op  No longer requiring cardene  labetalol, hydralazine prn    Endocrine  History of prediabetes  A1C 5.6  SSI and accu checks  Consistent carb diet    Morbid obesity  Nutrition consult    Other  Acute cerebrovascular accident (CVA) of basal ganglia  1/31 event: d/t to somnolence, not moving R side, and L fixed gaze, another 1g of keppra (2g total) was given as well as 4mg dex IV, MRI STAT was performed showing acute  infarction involving the left lentiform nucleus. Unfortunately not a candidate for tPA d/t surgery, likely iatrogenic in nature. Upon return from MRI, pt WD R side, still drowsy but not requiring sternal rub. GCS 10, still not gagging on oral airway left in place.  -liberalize BP goal to <160 in light of CVA  -NS @75, avoid hypotension  -q1h neuro checks  2/2 CVA unremarkable, stroke team signed off          The patient is being Prophylaxed for:  Venous Thromboembolism with: Mechanical or Chemical  Stress Ulcer with: Not Applicable   Ventilator Pneumonia with: not applicable    Activity Orders          Diet Dysphagia Mechanical Soft (IDDSI Level 5): Dysphagia 2 (Mechanical Soft Ground) starting at 02/03 0849    Turn patient every 2 hours starting at 02/01 1000        No Order     Level III    Ingrid Arnold PA-C  Neurocritical Care  [facility] - Neuro Critical Care

## 2022-02-04 LAB
ALBUMIN SERPL BCP-MCNC: 3.2 G/DL (ref 3.5–5.2)
ALP SERPL-CCNC: 55 U/L (ref 55–135)
ALT SERPL W/O P-5'-P-CCNC: 10 U/L (ref 10–44)
ANION GAP SERPL CALC-SCNC: 9 MMOL/L (ref 8–16)
AST SERPL-CCNC: 20 U/L (ref 10–40)
BASOPHILS # BLD AUTO: 0.01 K/UL (ref 0–0.2)
BASOPHILS NFR BLD: 0.1 % (ref 0–1.9)
BILIRUB SERPL-MCNC: 0.1 MG/DL (ref 0.1–1)
BLD PROD TYP BPU: NORMAL
BLD PROD TYP BPU: NORMAL
BLOOD UNIT EXPIRATION DATE: NORMAL
BLOOD UNIT EXPIRATION DATE: NORMAL
BLOOD UNIT TYPE CODE: 5100
BLOOD UNIT TYPE CODE: 5100
BLOOD UNIT TYPE: NORMAL
BLOOD UNIT TYPE: NORMAL
BUN SERPL-MCNC: 10 MG/DL (ref 6–20)
CALCIUM SERPL-MCNC: 8.6 MG/DL (ref 8.7–10.5)
CHLORIDE SERPL-SCNC: 115 MMOL/L (ref 95–110)
CO2 SERPL-SCNC: 14 MMOL/L (ref 23–29)
CODING SYSTEM: NORMAL
CODING SYSTEM: NORMAL
CREAT SERPL-MCNC: 0.6 MG/DL (ref 0.5–1.4)
DIFFERENTIAL METHOD: ABNORMAL
DISPENSE STATUS: NORMAL
DISPENSE STATUS: NORMAL
EOSINOPHIL # BLD AUTO: 0 K/UL (ref 0–0.5)
EOSINOPHIL NFR BLD: 0 % (ref 0–8)
ERYTHROCYTE [DISTWIDTH] IN BLOOD BY AUTOMATED COUNT: 15.6 % (ref 11.5–14.5)
EST. GFR  (AFRICAN AMERICAN): >60 ML/MIN/1.73 M^2
EST. GFR  (NON AFRICAN AMERICAN): >60 ML/MIN/1.73 M^2
GLUCOSE SERPL-MCNC: 122 MG/DL (ref 70–110)
HCT VFR BLD AUTO: 37.3 % (ref 37–48.5)
HGB BLD-MCNC: 11.5 G/DL (ref 12–16)
IMM GRANULOCYTES # BLD AUTO: 0.02 K/UL (ref 0–0.04)
IMM GRANULOCYTES NFR BLD AUTO: 0.2 % (ref 0–0.5)
LYMPHOCYTES # BLD AUTO: 1.4 K/UL (ref 1–4.8)
LYMPHOCYTES NFR BLD: 13.9 % (ref 18–48)
MAGNESIUM SERPL-MCNC: 1.8 MG/DL (ref 1.6–2.6)
MCH RBC QN AUTO: 27.3 PG (ref 27–31)
MCHC RBC AUTO-ENTMCNC: 30.8 G/DL (ref 32–36)
MCV RBC AUTO: 89 FL (ref 82–98)
MONOCYTES # BLD AUTO: 0.5 K/UL (ref 0.3–1)
MONOCYTES NFR BLD: 5 % (ref 4–15)
NEUTROPHILS # BLD AUTO: 7.9 K/UL (ref 1.8–7.7)
NEUTROPHILS NFR BLD: 80.8 % (ref 38–73)
NRBC BLD-RTO: 0 /100 WBC
PHOSPHATE SERPL-MCNC: 3.2 MG/DL (ref 2.7–4.5)
PLATELET # BLD AUTO: 192 K/UL (ref 150–450)
PMV BLD AUTO: 11.1 FL (ref 9.2–12.9)
POCT GLUCOSE: 102 MG/DL (ref 70–110)
POCT GLUCOSE: 106 MG/DL (ref 70–110)
POCT GLUCOSE: 119 MG/DL (ref 70–110)
POTASSIUM SERPL-SCNC: 4.7 MMOL/L (ref 3.5–5.1)
PROT SERPL-MCNC: 7.3 G/DL (ref 6–8.4)
RBC # BLD AUTO: 4.21 M/UL (ref 4–5.4)
SODIUM SERPL-SCNC: 138 MMOL/L (ref 136–145)
TRANS ERYTHROCYTES VOL PATIENT: NORMAL ML
TRANS ERYTHROCYTES VOL PATIENT: NORMAL ML
WBC # BLD AUTO: 9.83 K/UL (ref 3.9–12.7)

## 2022-02-04 PROCEDURE — 97530 THERAPEUTIC ACTIVITIES: CPT | Mod: CQ

## 2022-02-04 PROCEDURE — 63600175 PHARM REV CODE 636 W HCPCS

## 2022-02-04 PROCEDURE — 97535 SELF CARE MNGMENT TRAINING: CPT

## 2022-02-04 PROCEDURE — 99233 PR SUBSEQUENT HOSPITAL CARE,LEVL III: ICD-10-PCS | Mod: ,,, | Performed by: PSYCHIATRY & NEUROLOGY

## 2022-02-04 PROCEDURE — 99024 POSTOP FOLLOW-UP VISIT: CPT | Mod: ,,, | Performed by: PHYSICIAN ASSISTANT

## 2022-02-04 PROCEDURE — 85025 COMPLETE CBC W/AUTO DIFF WBC: CPT

## 2022-02-04 PROCEDURE — 83735 ASSAY OF MAGNESIUM: CPT

## 2022-02-04 PROCEDURE — 25000003 PHARM REV CODE 250: Performed by: PHYSICIAN ASSISTANT

## 2022-02-04 PROCEDURE — 97110 THERAPEUTIC EXERCISES: CPT

## 2022-02-04 PROCEDURE — 25000003 PHARM REV CODE 250

## 2022-02-04 PROCEDURE — 11000001 HC ACUTE MED/SURG PRIVATE ROOM

## 2022-02-04 PROCEDURE — 51702 INSERT TEMP BLADDER CATH: CPT

## 2022-02-04 PROCEDURE — 80053 COMPREHEN METABOLIC PANEL: CPT

## 2022-02-04 PROCEDURE — 63600175 PHARM REV CODE 636 W HCPCS: Performed by: STUDENT IN AN ORGANIZED HEALTH CARE EDUCATION/TRAINING PROGRAM

## 2022-02-04 PROCEDURE — 25000003 PHARM REV CODE 250: Performed by: STUDENT IN AN ORGANIZED HEALTH CARE EDUCATION/TRAINING PROGRAM

## 2022-02-04 PROCEDURE — 36415 COLL VENOUS BLD VENIPUNCTURE: CPT

## 2022-02-04 PROCEDURE — 99233 SBSQ HOSP IP/OBS HIGH 50: CPT | Mod: ,,, | Performed by: PSYCHIATRY & NEUROLOGY

## 2022-02-04 PROCEDURE — 92526 ORAL FUNCTION THERAPY: CPT

## 2022-02-04 PROCEDURE — 84100 ASSAY OF PHOSPHORUS: CPT

## 2022-02-04 PROCEDURE — 92507 TX SP LANG VOICE COMM INDIV: CPT

## 2022-02-04 PROCEDURE — 99024 PR POST-OP FOLLOW-UP VISIT: ICD-10-PCS | Mod: ,,, | Performed by: PHYSICIAN ASSISTANT

## 2022-02-04 PROCEDURE — 97112 NEUROMUSCULAR REEDUCATION: CPT | Mod: CQ

## 2022-02-04 RX ORDER — LACTULOSE 10 G/15ML
15 SOLUTION ORAL ONCE
Status: COMPLETED | OUTPATIENT
Start: 2022-02-04 | End: 2022-02-04

## 2022-02-04 RX ORDER — BACITRACIN ZINC 500 [USP'U]/G
OINTMENT TOPICAL 2 TIMES DAILY
Status: DISCONTINUED | OUTPATIENT
Start: 2022-02-04 | End: 2022-02-08 | Stop reason: HOSPADM

## 2022-02-04 RX ORDER — TAMSULOSIN HYDROCHLORIDE 0.4 MG/1
0.4 CAPSULE ORAL DAILY
Status: DISCONTINUED | OUTPATIENT
Start: 2022-02-04 | End: 2022-02-08 | Stop reason: HOSPADM

## 2022-02-04 RX ORDER — POLYETHYLENE GLYCOL 3350 17 G/17G
17 POWDER, FOR SOLUTION ORAL DAILY
Status: DISCONTINUED | OUTPATIENT
Start: 2022-02-04 | End: 2022-02-08 | Stop reason: HOSPADM

## 2022-02-04 RX ADMIN — MUPIROCIN: 20 OINTMENT TOPICAL at 09:02

## 2022-02-04 RX ADMIN — MINERAL OIL AND WHITE PETROLATUM: 30; 940 OINTMENT OPHTHALMIC at 09:02

## 2022-02-04 RX ADMIN — HEPARIN SODIUM 5000 UNITS: 5000 INJECTION INTRAVENOUS; SUBCUTANEOUS at 03:02

## 2022-02-04 RX ADMIN — FAMOTIDINE 20 MG: 20 TABLET, FILM COATED ORAL at 09:02

## 2022-02-04 RX ADMIN — LACTULOSE 15 G: 20 SOLUTION ORAL at 03:02

## 2022-02-04 RX ADMIN — POLYETHYLENE GLYCOL 3350 17 G: 17 POWDER, FOR SOLUTION ORAL at 03:02

## 2022-02-04 RX ADMIN — DOCUSATE SODIUM 50 MG AND SENNOSIDES 8.6 MG 1 TABLET: 8.6; 5 TABLET, FILM COATED ORAL at 09:02

## 2022-02-04 RX ADMIN — TOPIRAMATE 200 MG: 200 TABLET, FILM COATED ORAL at 09:02

## 2022-02-04 RX ADMIN — LEVETIRACETAM 1000 MG: 500 SOLUTION ORAL at 09:02

## 2022-02-04 RX ADMIN — MODAFINIL 100 MG: 100 TABLET ORAL at 06:02

## 2022-02-04 RX ADMIN — TAMSULOSIN HYDROCHLORIDE 0.4 MG: 0.4 CAPSULE ORAL at 09:02

## 2022-02-04 RX ADMIN — BACITRACIN 1 EACH: 500 OINTMENT TOPICAL at 09:02

## 2022-02-04 RX ADMIN — HEPARIN SODIUM 5000 UNITS: 5000 INJECTION INTRAVENOUS; SUBCUTANEOUS at 09:02

## 2022-02-04 RX ADMIN — DEXAMETHASONE SODIUM PHOSPHATE 4 MG: 4 INJECTION INTRA-ARTICULAR; INTRALESIONAL; INTRAMUSCULAR; INTRAVENOUS; SOFT TISSUE at 05:02

## 2022-02-04 RX ADMIN — DEXAMETHASONE SODIUM PHOSPHATE 4 MG: 4 INJECTION INTRA-ARTICULAR; INTRALESIONAL; INTRAMUSCULAR; INTRAVENOUS; SOFT TISSUE at 06:02

## 2022-02-04 RX ADMIN — HEPARIN SODIUM 5000 UNITS: 5000 INJECTION INTRAVENOUS; SUBCUTANEOUS at 05:02

## 2022-02-04 RX ADMIN — DEXAMETHASONE SODIUM PHOSPHATE 4 MG: 4 INJECTION INTRA-ARTICULAR; INTRALESIONAL; INTRAMUSCULAR; INTRAVENOUS; SOFT TISSUE at 12:02

## 2022-02-04 NOTE — PLAN OF CARE
POC reviewed and updated with the patient/caregiver. Questions regarding POC were encouraged and addressed with the patient/caregiver.  VSS, see flow-sheets. Patient is AO X 3 at this time, disoriented to Time. Fall/safety precautions maintained, no signs of injury noted during shift. Patient was repositioned for comfort, bed locked in low position with side rails X 3, bed alarm set, with call light within reach. Patient instructed to call staff for mobility, verbalized understanding. No acute signs of distress noted at this time.      Patient worked with PT/OT/& Speech today. Pt doing well in communicating/speaking & fed herself at each meal today. No c/o pain throughout shift. Patient has not had a BM since admission; 1 time dose of lactulose given today and daily Miralax started today. [facility]    Problem: Adult Inpatient Plan of Care  Goal: Plan of Care Review  Outcome: Ongoing, Progressing  Goal: Absence of Hospital-Acquired Illness or Injury  Outcome: Ongoing, Progressing  Goal: Optimal Comfort and Wellbeing  Outcome: Ongoing, Progressing     Problem: Infection  Goal: Absence of Infection Signs and Symptoms  Outcome: Ongoing, Progressing     Problem: Altered Behavior (Delirium)  Goal: Improved Behavioral Control  Outcome: Ongoing, Progressing

## 2022-02-04 NOTE — ASSESSMENT & PLAN NOTE
32 yo female with intractable epilepsy admitted to NICU after left anterior temporal lobectomy for surgical management. Post-op MRI with acute infarct involving left basal ganglia    Recommendations:  - Continue outpatient AED regimen - Levetiracetam 1000 mg BID, Topiramate 200 mg BID. Please continue this on discharge with no changes.  - Continue close outpatient follow up with Dr. Vasquez/Dr. Stapleton for further management of epilepsy  - Post-operative follow up per NSGY, Vascular Neurolgoy    Plan of care discussed with NSGY team. Will sign off, please call with any questions.

## 2022-02-04 NOTE — ANESTHESIA POSTPROCEDURE EVALUATION
Anesthesia Post Evaluation    Patient: Carolyn Mccullough    Procedure(s) Performed: Procedure(s) (LRB):  LEFT CRANIOTOMY FOR TEMPORAL LOBECTOMY WITH BRAINLAB (Left)    Final Anesthesia Type: general      Patient location during evaluation: ICU  Patient participation: No - Unable to Participate, Sedation  Level of consciousness: awake  Post-procedure vital signs: reviewed and stable  Pain management: adequate  Airway patency: patent    PONV status at discharge: No PONV  Anesthetic complications: no      Cardiovascular status: blood pressure returned to baseline  Respiratory status: unassisted  Hydration status: euvolemic  Follow-up not needed.          Vitals Value Taken Time   /77 02/03/22 1929   Temp 36.7 °C (98.1 °F) 02/03/22 1929   Pulse 85 02/03/22 1929   Resp 18 02/03/22 1929   SpO2 97 % 02/03/22 1929         Event Time   Out of Recovery 20:00:00         Pain/Francisco Score: No data recorded

## 2022-02-04 NOTE — OP NOTE
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  Left frontal temporal craniotomy for resection of left temporal encephalocele 2. Left temporal craniotomy for anterior temporal lobectomy with partial amygdalohippocampectomy 3. Use of neuro navigation 4. Microsurgical techniques     Surgeon: Jose Tracy MD    Co-Surgeon:: Mayra Iraheta MD    Two staff neurosurgery needed for this complex operation for due to the combined pathology of encephalocele and mesial temporal issues    Indication for Procedure:  This is a 31-year-old female with intractable epilepsy was found have possible 2 focus of pathology with a more anterior temporal cephalocele and firing with the mesial temporal structures as brought out by stereo EEG recording from outside facility.    Operative Note:  Patient was anesthetized intubated by anesthesia.  Patient placed in Lopez head pin.  Patient placed in supine position head turned to the right.  Patient head was made parallel to the floor.  Navigation system was registered using facial registration.  The left area was shaved prepped and draped sterile fashion.  We used navigation to identify the encephalocele air as well as the mesial temporal structures.  She had challenging anatomy because she has small temporal tips.  We had set to to Omi trees 1 for the cephalocele 1 for the temporal tip.  Local anesthetic was injected and  then a curvilinear incision was carried out.  The temporalis was taken down and retracted anteriorly and inferiorly.  We able to expose the keyhole and the floor of the temporal fossa and the root of the zygoma.  We made a bur hole at the keyhole in the form temporal fossa and then turning frontal temporal craniotomy.  The dura was tacked up in open and flapped toward the sphenoid ridge which had been taken down and drilled down to smooth out entry.  We then were able to identify the frontal temporal lobe and the sylvian fissure.  We measured back on 3.5 cm from the anterior temporal tip.  We identified the middle temporal gyrus we made a small corticectomy in the middle temporal gyrus 3.5 cm posterior to the tip.  We slowly went through the middle temporal gyrus and worked toward the temporal tip.  Even with navigation we never got quite to the temporal tip but was able to get down to the floor of the of the temporal fossa.  At this stage we had enough anatomic landmarks to then bring the middle temporal gyrus cut all we anterior to the temporal tip and all way down the floor and then performed a anterior temporal resection heading toward the encephalocele area.  We able to identify findings cephalocele air resected the brain around the encephalocele area leaving a small amount brain plugged I had the whole.  We then laid down some Surgicel to plug the hole then turned our attention to the mesial temporal structures.  At this point time were able to identify the anterior temporal tip and choroid plexus.  We then brought in a self-retaining retractors placed did lifting up the choroid plexus getting as to look more directly at the hippocampus and the fimbria.  We then continued to work using microsurgical magnification with the microscope and using microsurgical technique to start to remove the parahippocampal gyrus as well as the amygdala and the hippocampus.  While we were working on the the hippocampus the  self-retaining retractor did give way and went deep to the lateral ventricle we retracted it back into position upon identifying that we did not see any active bleeding along the tract we proceeded after we securing that position that retractor and moving it to more of the ergonomic position.  Ultimately we removed the retracted use use cotton patties at the choroid plexus.  We then was able to remove the hippocampus and was able to keep that as a sub peel resection as we were able to look into the cistern.  We then did a sub peel resection of the uncus as well now able to see over the tentorial edge identified the 3rd nerve and the PCA and PCOM complex.  The arachnoid was intact we did not violate the arachnoid plane.  Once were happy with this we obtained hemostasis confirmed with navigation that we were at the bend of the peduncle and chasing the hippocampus.  This case we did not feel we needed to taste epicanthus significantly around the peduncle.  We obtained hemostasis and then reapproximated the dura then we fixated the bone flap using titanium plates and screws reapproximated the temporalis and closed rest of the wound in layers.  Patient was extubated brought to the neuro ICU.    EBL:  100 cc  Specimen Sent:  Mesial temporal structures

## 2022-02-04 NOTE — PT/OT/SLP PROGRESS
Speech Language Pathology Treatment    Patient Name:  Carolyn Mccullough   MRN:  [id number]  Admitting Diagnosis: Localization-related (focal) (partial) symptomatic epilepsy and epileptic syndromes with complex partial seizures, intractable, without status epilepticus    Recommendations:                 General Recommendations:  Dysphagia therapy and Speech/language therapy  Diet recommendations:  Mechanical soft, Liquid Diet Level: Thin   Aspiration Precautions: Standard aspiration precautions   General Precautions: Standard, aphasia,aspiration,fall  Communication strategies:  provide increased time to answer    Subjective     Awake/alert    Pain/Comfort:  · Pain Rating 1: 0/10  · Pain Rating Post-Intervention 1: 0/10    Respiratory Status: Room air    Objective:     Has the patient been evaluated by SLP for swallowing?   Yes  Keep patient NPO? No     Pt upright in bed eating breakfast upon entry. She tolerated self fed bites of mechanical soft diet/thin liquids x7 with no overt clinical s/s of airway compromise and timely oral clearance. Pt with increased verbalizations noted from previous session. Pt able to sustain phonation throughout majority of conversation. She was able to converse about favorite TV show and other likes. Continue mechanical soft diet/thin liquids at this time. SLP will follow up for ongoing assessment.     Assessment:     Carolyn Mccullough is a 31 y.o. female with an SLP diagnosis of Dysphagia and Dysarthria.      Goals:   Multidisciplinary Problems     SLP Goals        Problem: SLP Goal    Goal Priority Disciplines Outcome   SLP Goal     SLP Ongoing, Progressing   Description: Goals due 2/8  1.  Pt. Will participate in ongoing assessment of swallow  2.  Pt. Will participate in ongoing speech language evaluation  3. Pt will answer yes.no questions with 70% accy and mod cues   4. Pt will follow single step commands with 50% accy and max cues  5. Pt will utilize speech strategies with mod cues to  increase intelligibility  6. Pt will complete rote speech tasks with 50% accy and mod cues                    Plan:     · Patient to be seen:  4 x/week   · Plan of Care expires:  03/01/22  · Plan of Care reviewed with:  patient,mother   · SLP Follow-Up:  Yes       Discharge recommendations:  rehabilitation facility     Time Tracking:     SLP Treatment Date:   02/04/22  Speech Start Time:  0823  Speech Stop Time:  0839     Speech Total Time (min):  16 min    Billable Minutes: Speech Therapy Individual 8 and Treatment Swallowing Dysfunction 8    02/04/2022

## 2022-02-04 NOTE — ASSESSMENT & PLAN NOTE
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  --Continue dex 4q6, GI PPx while on steroids  --Continue home AEDs keppra and topiramate  --Aggressive PT/OT/SLP post op  --DVT ppx: TEDs/SCDs/SQH  --Started on modafinil in ICU to assist with increased alertness  --HTN: PRN labetalol, hydralazine. SBP <160.   --Bowel regimen: Daily Miralax. Lactulose tday. CTM for bowel movement.  --Urinary: Smith replaced 2/3. Flomax started. Plan for smith removal Sunday.    Dispo: PT/OT recs for rehab. Pending placement.

## 2022-02-04 NOTE — PT/OT/SLP PROGRESS
Physical Therapy Treatment    Patient Name:  Carolyn Mccullough   MRN:  [id number]    Recommendations:     Discharge Recommendations:  rehabilitation facility   Discharge Equipment Recommendations:  (TBD)   Barriers to discharge: impaired functional mobility requiring increased assistance    Assessment:     Carolyn Mccullough is a 31 y.o. female admitted with a medical diagnosis of Localization-related (focal) (partial) symptomatic epilepsy and epileptic syndromes with complex partial seizures, intractable, without status epilepticus.  She presents with the following impairments/functional limitations:  weakness,impaired endurance,impaired sensation,impaired self care skills,impaired functional mobilty,gait instability,impaired balance,decreased coordination,decreased upper extremity function,decreased lower extremity function,impaired cognition,decreased safety awareness,abnormal tone,decreased ROM,impaired coordination,impaired fine motor. Pt tolerated session well with focus on bed mobility, transfers, and EOB balance/endurance. Pt progressing fairly with continued improving function with standing from EOB. Pt remains most limited by R sided hemiparesis. Pt able to maintain static sitting balance with CGA and requires Min to Mod A for dynamic reaching with LOBs to pts R side. Pt will continue to benefit from therapy services to address impairments listed above.     Rehab Prognosis: Good; patient would benefit from acute skilled PT services to address these deficits and reach maximum level of function.    Recent Surgery: Procedure(s) (LRB):  LEFT CRANIOTOMY FOR TEMPORAL LOBECTOMY WITH BRAINLAB (Left) 4 Days Post-Op    Plan:     During this hospitalization, patient to be seen 4 x/week to address the identified rehab impairments via gait training,therapeutic activities,neuromuscular re-education,therapeutic exercises and progress toward the following goals:    · Plan of Care Expires:  03/04/22    Subjective     Chief  Complaint: no c/o  Pain/Comfort:  · Pain Rating 1: 0/10  · Pain Rating Post-Intervention 1: 0/10      Objective:     Communicated with RN prior to session.  Patient found HOB elevated with blood pressure cuff,telemetry,pulse ox (continuous),peripheral IV upon PTA entry to room.     General Precautions: Standard, aphasia,fall,aspiration   Orthopedic Precautions:N/A   Braces: N/A  Respiratory Status: Room air     Functional Mobility:  · Bed Mobility:     · Rolling Left:  moderate assistance  · Supine to Sit: moderate assistance  · Sit to Supine: moderate assistance  · Transfers:     · Sit to Stand:  moderate assistance with hand-held assist; blocking and assistance for R hemiparesis      AM-PAC 6 CLICK MOBILITY  Turning over in bed (including adjusting bedclothes, sheets and blankets)?: 2  Sitting down on and standing up from a chair with arms (e.g., wheelchair, bedside commode, etc.): 2  Moving from lying on back to sitting on the side of the bed?: 2  Moving to and from a bed to a chair (including a wheelchair)?: 2  Need to walk in hospital room?: 1  Climbing 3-5 steps with a railing?: 1  Basic Mobility Total Score: 10       Therapeutic Activities and Exercises:  Pt assisted with functional mobility as noted above.   Pt performs sit<>stand x 4 trials with HHA and Mod A for R sided hemiparetic management. R knee block and assist to maintain R hip extension.   Facilitated RUE weight bearing for joint approximation and sensory input.   Weight shifting in standing to facilitate loading into RLE, sensory input/joint approximation.   Pt and daughter educated on PROM therex for BUE/BLE. Informed mother that focus should be on maintaining ROM not strengthening at this time.     Patient left HOB elevated with all lines intact, call button in reach and bed alarm on.    GOALS:   Multidisciplinary Problems     Physical Therapy Goals        Problem: Physical Therapy Goal    Goal Priority Disciplines Outcome Goal Variances  Interventions   Physical Therapy Goal     PT, PT/OT Ongoing, Progressing     Description: Goals to be met by: 2022     Patient will increase functional independence with mobility by performin. Supine to sit with MInimal Assistance  2. Sit to supine with MInimal Assistance  3. Sit to stand transfer with Minimal Assistance  4. Bed to chair transfer with Minimal Assistance using LRAD  5. Gait  x 5 feet with Minimal Assistance using LRAD.   6. Sitting at edge of bed x10 minutes with Contact Guard Assistance  7. Lower extremity exercise program x15 reps per handout, with assistance as needed                     Time Tracking:     PT Received On: 22  PT Start Time: 852     PT Stop Time: 930  PT Total Time (min): 38 min     Billable Minutes: Therapeutic Activity 23 and Neuromuscular Re-education 15    Treatment Type: Treatment  PT/PTA: PTA     PTA Visit Number: 2022

## 2022-02-04 NOTE — SUBJECTIVE & OBJECTIVE
"Interval History:  NAEON. AFVSS. Patient stepped down to floor care. Smith replaced yesterday due to retention. Flomax started. Plan for smith removal Sunday. Denies headaches, seizures, or new weakness.     Medications:  Continuous Infusions:  Scheduled Meds:   dexamethasone  4 mg Intravenous Q6H    famotidine  20 mg Oral BID    heparin (porcine)  5,000 Units Subcutaneous Q8H    lactulose  15 g Oral Once    levetiracetam  1,000 mg Oral BID    modafiniL  100 mg Oral QAM    mupirocin   Nasal BID    polyethylene glycol  17 g Oral Daily    senna-docusate 8.6-50 mg  1 tablet Oral BID    tamsulosin  0.4 mg Oral Daily    topiramate  200 mg Oral BID    white petrolatum-mineral oiL   Right Eye BID     PRN Meds:acetaminophen, dextrose 10%, dextrose 10%, glucagon (human recombinant), glucose, glucose, HYDROcodone-acetaminophen, insulin aspart U-100, ondansetron, ondansetron     Review of Systems  Objective:     Weight: 118.8 kg (262 lb)  Body mass index is 43.6 kg/m².  Vital Signs (Most Recent):  Temp: 98.1 °F (36.7 °C) (02/04/22 0752)  Pulse: (!) 55 (02/04/22 0752)  Resp: 16 (02/04/22 0752)  BP: 112/69 (02/04/22 0752)  SpO2: 100 % (02/04/22 0752) Vital Signs (24h Range):  Temp:  [97.8 °F (36.6 °C)-98.7 °F (37.1 °C)] 98.1 °F (36.7 °C)  Pulse:  [55-85] 55  Resp:  [16-18] 16  SpO2:  [97 %-100 %] 100 %  BP: (108-124)/(65-79) 112/69                          Urethral Catheter 02/03/22 1709 Silicone (Active)   $ Smith Insertion Complete 02/03/22 1800   Site Assessment Clean;Intact 02/04/22 0800   Collection Container Urimeter 02/04/22 0800   Securement Method secured to top of thigh w/ adhesive device 02/04/22 0800   Catheter Care Performed yes 02/04/22 0800   Reason for Continuing Urinary Catheterization Urinary retention 02/04/22 0800   CAUTI Prevention Bundle StatLock in place w 1" slack;Intact seal between catheter & drainage tubing;Drainage bag/urimeter off the floor;No dependent loops or kinks;Sheeting clip in " use;Drainage bag/urimeter not overfilled (<2/3 full);Drainage bag/urimeter below bladder 02/04/22 0800   Output (mL) 800 mL 02/04/22 0500       Neurosurgery Physical Exam  General: well developed, well nourished, no distress.   Head: normocephalic,   Neck: No tracheal deviation.   Neurologic: Alert  Mental Status: Awake, Alert, Oriented x 2 (person, place - not oriented to year)  Language: expressive aphasia  Speech: dysarthric  Cranial nerves: right facial droop  Eyes:  EOMI.   Pulmonary: normal respirations, no signs of respiratory distress  Abdomen: soft, non-distended, not tender to palpation    Sensory: intact to light touch throughout  Motor Strength:No abnormal movements seen. Left side full strength. Plegic right side.    Strength  Deltoids Triceps Biceps Wrist Extension Wrist Flexion Hand    Upper: R 0/5 0/5 0/5 0/5 0/5 0/5    L 5/5 5/5 5/5 5/5 5/5 5/5     Strength  Iliopsoas Quadriceps Knee  Flexion Tibialis  anterior Gastro- cnemius EHL   Lower: R 0/5 0/5 0/5 0/5 0/5 0/5    L 5/5 5/5 5/5 5/5 5/5 5/5     Vascular: No LE edema.   Skin: Skin is warm, dry and intact.      Incision: c/d/i with skin edges well approximated with staples. No surrounding erythema or edema. No drainage from incision. No palpable hematoma or underlying fluid collection.        Significant Labs:  Recent Labs   Lab 02/03/22  0029 02/04/22  0348   * 122*    138   K 4.0 4.7   * 115*   CO2 16* 14*   BUN 9 10   CREATININE 0.6 0.6   CALCIUM 8.1* 8.6*   MG 1.9 1.8     Recent Labs   Lab 02/03/22  0029 02/04/22  0348   WBC 10.12 9.83   HGB 10.5* 11.5*   HCT 33.5* 37.3    192     No results for input(s): LABPT, INR, APTT in the last 48 hours.  Microbiology Results (last 7 days)     ** No results found for the last 168 hours. **        All pertinent labs from the last 24 hours have been reviewed.    Significant Diagnostics:  I have reviewed all pertinent imaging results/findings within the past 24 hours.

## 2022-02-04 NOTE — ASSESSMENT & PLAN NOTE
- Noted on MRI post-operatively, patient with expressive aphasia (some improvement noted), right hemiparesis  - BP management per NSGY, goal <160 in setting of recent infarct  - Outpatient follow up per NSGY/[facility]

## 2022-02-04 NOTE — PLAN OF CARE
Problem: SLP Goal  Goal: SLP Goal  Description: Goals due 2/8  1.  Pt. Will participate in ongoing assessment of swallow  2.  Pt. Will participate in ongoing speech language evaluation  3. Pt will answer yes.no questions with 70% accy and mod cues   4. Pt will follow single step commands with 50% accy and max cues  5. Pt will utilize speech strategies with mod cues to increase intelligibility  6. Pt will complete rote speech tasks with 50% accy and mod cues   Outcome: Ongoing, Progressing   Continue POC at this time, all goals remain appropriate.   2/4/2022

## 2022-02-04 NOTE — PT/OT/SLP PROGRESS
Occupational Therapy   Treatment    Name: Carolyn Mccullough  MRN: [id number]  Admitting Diagnosis:  Localization-related (focal) (partial) symptomatic epilepsy and epileptic syndromes with complex partial seizures, intractable, without status epilepticus  4 Days Post-Op    Recommendations:     Discharge Recommendations: rehabilitation facility  Discharge Equipment Recommendations:   (TBD on rehab)  Barriers to discharge:   (increased (A) required)    Assessment:     Carolyn Mccullough is a 31 y.o. female with a medical diagnosis of Localization-related (focal) (partial) symptomatic epilepsy and epileptic syndromes with complex partial seizures, intractable, without status epilepticus.  She presents with good participation and motivation. Pt continues to require (A) with all activities & is at risk for falls. Pt with continued improvements in transfers & balance in standing & sitting unsupported. Goals remain appropriate. Performance deficits affecting function are weakness,impaired endurance,impaired self care skills,impaired functional mobilty,impaired balance,decreased safety awareness,decreased lower extremity function,decreased upper extremity function,decreased coordination,abnormal tone,decreased ROM,impaired coordination,impaired fine motor,impaired sensation.     Rehab Prognosis:  Fair; patient would benefit from acute skilled OT services to address these deficits and reach maximum level of function.       Plan:     Patient to be seen 4 x/week to address the above listed problems via self-care/home management,therapeutic activities,therapeutic exercises,neuromuscular re-education,cognitive retraining,sensory integration  · Plan of Care Expires: 03/02/22  · Plan of Care Reviewed with: patient,mother    Subjective   Pt agreed that she was doing better.  Pain/Comfort:  · Pain Rating 1: 0/10  · Pain Rating Post-Intervention 1: 0/10    Objective:     Communicated with: RN prior to session.  Patient found supine with  smith catheter,telemetry (visi monitor; mother present during session) upon OT entry to room.    General Precautions: Standard, fall,aphasia   Orthopedic Precautions:N/A   Braces: N/A     Occupational Performance:     Bed Mobility:    · Patient completed Scooting/Bridging with minimum assistance scooting forward on EOB & up HOB while supine  · Patient completed Supine to Sit with moderate assistance  · Patient completed Sit to Supine with minimum assistance     Functional Mobility/Transfers:  · Patient completed Sit <> Stand Transfer with moderate assistance  with  no assistive device from EOB with (A) at trunk & to prevent RLE buckling.  · Functional Mobility: Pt performed prolonged static standing trial with cues for leftward weight shifting to facilitate balance as well as (A) at RLE to prevent buckling.    Activities of Daily Living:  · Upper Body Dressing: maximal assistance donning gown around back using shakila technique while seated EOB      [facility] 6 Click ADL: 12    Treatment & Education:  Pt required SBA for postural control while seated EOB.  Provided verbal & physical cues to facilitate postural control.  Provided RUE PROM in all planes x 10 reps each & RLE PROM in 3 planes x 10 reps each while seated EOB. Provided education regarding techniques for UE & LE dressing as well as standing balance & transfer techniques.  Provided education regarding pt progress as well as reasons for elevation of RUE (edema management & shoulder approximation) while supine in bed. Pt had no further questions & when asked whether there were any concerns pt reported none.        Patient left supine with all lines intact, call button in reach, bed alarm on, RN notified and mother presentEducation:      GOALS:   Multidisciplinary Problems     Occupational Therapy Goals        Problem: Occupational Therapy Goal    Goal Priority Disciplines Outcome Interventions   Occupational Therapy Goal     OT, PT/OT Ongoing, Progressing     Description: Goals to be met by: 2/16/2022    Patient will increase functional independence with ADLs by performing:    UE Dressing with Minimal Assistance.  Grooming while EOB with SBA.  Feedomg with Supervision at EOB.  Sitting at edge of bed 10 minutes with Supervision  Rolling to Left with Minimal Assistance.   Supine to sit with Minimal Assistance.  Transfers with Min (A)                     Time Tracking:     OT Date of Treatment: 02/04/22  OT Start Time: 1404  OT Stop Time: 1436  OT Total Time (min): 32 min    Billable Minutes:Self Care/Home Management 16  Therapeutic Exercise 16    OT/MAXWELL: OT          2/4/2022

## 2022-02-04 NOTE — PROGRESS NOTES
Tyrel Oneal - Neurosurgery ([facility])  Neurosurgery  Progress Note    Subjective:     History of Present Illness: Pt is a 31F with intractable epilepsy who presents on 1/31 for elective left anterior temporal lobectomy.      Post-Op Info:  Procedure(s) (LRB):  LEFT CRANIOTOMY FOR TEMPORAL LOBECTOMY WITH BRAINLAB (Left)   4 Days Post-Op     Interval History:  NAEON. AFVSS. Patient stepped down to floor care. Smith replaced yesterday due to retention. Flomax started. Plan for smith removal Sunday. Denies headaches, seizures, or new weakness.     Medications:  Continuous Infusions:  Scheduled Meds:   dexamethasone  4 mg Intravenous Q6H    famotidine  20 mg Oral BID    heparin (porcine)  5,000 Units Subcutaneous Q8H    lactulose  15 g Oral Once    levetiracetam  1,000 mg Oral BID    modafiniL  100 mg Oral QAM    mupirocin   Nasal BID    polyethylene glycol  17 g Oral Daily    senna-docusate 8.6-50 mg  1 tablet Oral BID    tamsulosin  0.4 mg Oral Daily    topiramate  200 mg Oral BID    white petrolatum-mineral oiL   Right Eye BID     PRN Meds:acetaminophen, dextrose 10%, dextrose 10%, glucagon (human recombinant), glucose, glucose, HYDROcodone-acetaminophen, insulin aspart U-100, ondansetron, ondansetron     Review of Systems  Objective:     Weight: 118.8 kg (262 lb)  Body mass index is 43.6 kg/m².  Vital Signs (Most Recent):  Temp: 98.1 °F (36.7 °C) (02/04/22 0752)  Pulse: (!) 55 (02/04/22 0752)  Resp: 16 (02/04/22 0752)  BP: 112/69 (02/04/22 0752)  SpO2: 100 % (02/04/22 0752) Vital Signs (24h Range):  Temp:  [97.8 °F (36.6 °C)-98.7 °F (37.1 °C)] 98.1 °F (36.7 °C)  Pulse:  [55-85] 55  Resp:  [16-18] 16  SpO2:  [97 %-100 %] 100 %  BP: (108-124)/(65-79) 112/69                          Urethral Catheter 02/03/22 1709 Silicone (Active)   $ Smith Insertion Complete 02/03/22 1800   Site Assessment Clean;Intact 02/04/22 0800   Collection Container Urimeter 02/04/22 0800   Securement Method secured to top of thigh w/  "adhesive device 02/04/22 0800   Catheter Care Performed yes 02/04/22 0800   Reason for Continuing Urinary Catheterization Urinary retention 02/04/22 0800   CAUTI Prevention Bundle StatLock in place w 1" slack;Intact seal between catheter & drainage tubing;Drainage bag/urimeter off the floor;No dependent loops or kinks;Sheeting clip in use;Drainage bag/urimeter not overfilled (<2/3 full);Drainage bag/urimeter below bladder 02/04/22 0800   Output (mL) 800 mL 02/04/22 0500       Neurosurgery Physical Exam  General: well developed, well nourished, no distress.   Head: normocephalic,   Neck: No tracheal deviation.   Neurologic: Alert  Mental Status: Awake, Alert, Oriented x 2 (person, place - not oriented to year)  Language: expressive aphasia  Speech: dysarthric  Cranial nerves: right facial droop  Eyes:  EOMI.   Pulmonary: normal respirations, no signs of respiratory distress  Abdomen: soft, non-distended, not tender to palpation    Sensory: intact to light touch throughout  Motor Strength:No abnormal movements seen. Left side full strength. Plegic right side.    Strength  Deltoids Triceps Biceps Wrist Extension Wrist Flexion Hand    Upper: R 0/5 0/5 0/5 0/5 0/5 0/5    L 5/5 5/5 5/5 5/5 5/5 5/5     Strength  Iliopsoas Quadriceps Knee  Flexion Tibialis  anterior Gastro- cnemius EHL   Lower: R 0/5 0/5 0/5 0/5 0/5 0/5    L 5/5 5/5 5/5 5/5 5/5 5/5     Vascular: No LE edema.   Skin: Skin is warm, dry and intact.      Incision: c/d/i with skin edges well approximated with staples. No surrounding erythema or edema. No drainage from incision. No palpable hematoma or underlying fluid collection.        Significant Labs:  Recent Labs   Lab 02/03/22  0029 02/04/22  0348   * 122*    138   K 4.0 4.7   * 115*   CO2 16* 14*   BUN 9 10   CREATININE 0.6 0.6   CALCIUM 8.1* 8.6*   MG 1.9 1.8     Recent Labs   Lab 02/03/22  0029 02/04/22  0348   WBC 10.12 9.83   HGB 10.5* 11.5*   HCT 33.5* 37.3    192     No " results for input(s): LABPT, INR, APTT in the last 48 hours.  Microbiology Results (last 7 days)     ** No results found for the last 168 hours. **        All pertinent labs from the last 24 hours have been reviewed.    Significant Diagnostics:  I have reviewed all pertinent imaging results/findings within the past 24 hours.    Assessment/Plan:     * Localization-related (focal) (partial) symptomatic epilepsy and epileptic syndromes with complex partial seizures, intractable, without status epilepticus  Pt is 31F with temporal epilepsy who is now s/p left craniotomy for anterior temporal lobectomy 2/1:    --Admitted to floor care   -Q4h neurochecks   -- All diagnostics reviewed   -CTH post op satisfactory   -MRI brain 2/1 with expected changes as well as acute infarct deep structures adjacent to surgical bed.  --Continue dex 4q6, GI PPx while on steroids  --Continue home AEDs keppra and topiramate  --Aggressive PT/OT/SLP post op  --DVT ppx: TEDs/SCDs/SQH  --Started on modafinil in ICU to assist with increased alertness  --HTN: PRN labetalol, hydralazine. SBP <160.   --Bowel regimen: Daily Miralax. Lactulose tday. CTM for bowel movement.  --Urinary: Smith replaced 2/3. Flomax started. Plan for smith removal Sunday.    Dispo: PT/OT recs for rehab. Pending placement.    Discussed with Dr. Myrtle Hoover PA-C  Neurosurgery  Tyrel Oneal - Neurosurgery ([facility])

## 2022-02-04 NOTE — PROGRESS NOTES
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 Post-operatively, patient noted to have right hemiparesis, aphasia. MRI brain showing acute infarction involving the left lentiform nucleus. Patient continued on home AED regimen - Levetiracetam 1000 mg BID, Topiramate 200 mg BID, however unable to receive TPM given current NPO status.  2/1/21: Patient continued on Levetiracetam 1000 mg BID, pending NG placement for Topiramate. If unable to obtain enteral access, recommend increasing Levetiracetam to 1500 mg BID until it is established. Vascular Neurology sonsulted,appeciate recommendations.   2/2/21: Patient tolerating PO medications crushed, on home Topiramate 200 mg BID, Levetiracetam 1000 mg BID. Some mumbled speech in response to questions, difficult to understand, at times more clear per nursing. Withdrawal to noxious stimuli on R, toes now down-going on R.   2/3/21: Some improvement noted in speech, able to state "alright", follows commands on left. No noted seizures. Stepped down to [facility].   2/4/21: No acute events overnight, continued on home AED regimen. Pending IPR placement/transfer. Able to repeat some words, some paraphasias noted. R hemiparesis.       Interval History: No acute events overnight, pending rehab placement/transfer. Mother at bedside.     Current Facility-Administered Medications   Medication Dose Route Frequency Provider Last Rate Last " Admin    acetaminophen tablet 650 mg  650 mg Oral Q4H PRN Anderson Ac MD        dexamethasone injection 4 mg  4 mg Intravenous Q6H Anderson Ac MD   4 mg at 02/04/22 1236    dextrose 10% bolus 125 mL  12.5 g Intravenous PRN Luis San MD        dextrose 10% bolus 250 mL  25 g Intravenous PRN Luis San MD        famotidine tablet 20 mg  20 mg Oral BID Anderson Ac MD   20 mg at 02/04/22 0937    glucagon (human recombinant) injection 1 mg  1 mg Intramuscular PRN Ingrid Arnold PA-C        glucose chewable tablet 16 g  16 g Oral PRN Ingrid Arnold PA-C        glucose chewable tablet 24 g  24 g Oral PRN Ingrid Arnold PA-C        heparin (porcine) injection 5,000 Units  5,000 Units Subcutaneous Q8H Ingrid Arnold PA-C   5,000 Units at 02/04/22 0531    HYDROcodone-acetaminophen 5-325 mg per tablet 1 tablet  1 tablet Oral Q4H PRN Anderson Ac MD        insulin aspart U-100 pen 0-5 Units  0-5 Units Subcutaneous QID (AC + HS) PRN Ingrid Arnold PA-C        lactulose 20 gram/30 mL solution Soln 15 g  15 g Oral Once Garima Hoover PA-C        levetiracetam 500 mg/5 mL (5 mL) liquid Soln 1,000 mg  1,000 mg Oral BID Ingrid Arnold PA-C   1,000 mg at 02/04/22 0936    modafiniL tablet 100 mg  100 mg Oral QAM Ingrid Arnold PA-C   100 mg at 02/04/22 0609    mupirocin 2 % ointment   Nasal BID Anderson Ac MD   Given at 02/04/22 0939    ondansetron disintegrating tablet 8 mg  8 mg Oral Q8H PRN Anderson Ac MD        ondansetron injection 4 mg  4 mg Intravenous Q12H PRN Anderson Ac MD        polyethylene glycol packet 17 g  17 g Oral Daily Garima Hoover PA-C        senna-docusate 8.6-50 mg per tablet 1 tablet  1 tablet Oral BID Ingrid Arnold PA-C   1 tablet at 02/04/22 0938    tamsulosin 24 hr capsule 0.4 mg  0.4 mg Oral Daily Garima Hoover PA-C   0.4 mg at 02/04/22 0937    topiramate  "tablet 200 mg  200 mg Oral BID Anderson Ac MD   200 mg at 02/04/22 0937    white petrolatum-mineral oil (SYSTANE NIGHTTIME) ophthalmic ointment   Right Eye BID Ingrid Arnold PA-C   Given at 02/04/22 0938     Continuous Infusions:    Review of Systems   Unable to perform ROS: Other     Objective:     Vital Signs (Most Recent):  Temp: 98.1 °F (36.7 °C) (02/04/22 0752)  Pulse: (!) 55 (02/04/22 0752)  Resp: 16 (02/04/22 0752)  BP: 112/69 (02/04/22 0752)  SpO2: 100 % (02/04/22 0752) Vital Signs (24h Range):  Temp:  [97.8 °F (36.6 °C)-98.7 °F (37.1 °C)] 98.1 °F (36.7 °C)  Pulse:  [55-85] 55  Resp:  [16-18] 16  SpO2:  [97 %-100 %] 100 %  BP: (108-124)/(65-79) 112/69     Weight: 118.8 kg (262 lb)  Body mass index is 43.6 kg/m².    Physical Exam  Vitals and nursing note reviewed.   Constitutional:       Appearance: She is not toxic-appearing or diaphoretic.   HENT:      Head:      Comments: Surgical dressing in place  Eyes:      General: No scleral icterus.     Conjunctiva/sclera: Conjunctivae normal.      Pupils: Pupils are equal, round, and reactive to light.   Cardiovascular:      Rate and Rhythm: Normal rate.   Pulmonary:      Effort: Pulmonary effort is normal. No respiratory distress.   Abdominal:      General: There is no distension.      Palpations: Abdomen is soft.   Musculoskeletal:         General: No deformity or signs of injury.      Cervical back: Neck supple. No rigidity.   Skin:     General: Skin is warm and dry.   Neurological:      Mental Status: She is alert.   Psychiatric:      Comments: Unable to assess         NEUROLOGICAL EXAMINATION:     MENTAL STATUS        Alert, tracks examiner       CRANIAL NERVES     CN III, IV, VI   Pupils are equal, round, and reactive to light.    CN VII   Right facial weakness: central       Some improvement noted to speech, able to repeat some words, state she is "doing good", some paraphasias       MOTOR EXAM        R hemiparesis  Spontaneous movement, " follows commands on left     SENSORY EXAM  Sensation intact bilaterally    Significant Labs: All pertinent lab results from the past 24 hours have been reviewed.    Significant Studies: I have reviewed all pertinent imaging results/findings within the past 24 hours.    Assessment and Plan:     * Localization-related (focal) (partial) symptomatic epilepsy and epileptic syndromes with complex partial seizures, intractable, without status epilepticus  32 yo female with intractable epilepsy admitted to NICU after left anterior temporal lobectomy for surgical management. Post-op MRI with acute infarct involving left basal ganglia    Recommendations:  - Continue outpatient AED regimen - Levetiracetam 1000 mg BID, Topiramate 200 mg BID. Please continue this on discharge with no changes.  - Continue close outpatient follow up with Dr. Vasquez/Dr. Stapleton for further management of epilepsy  - Post-operative follow up per NSGY, Vascular Neurolgoy    Plan of care discussed with NSGY team. Will sign off, please call with any questions.    Acute cerebrovascular accident (CVA) of basal ganglia  - Noted on MRI post-operatively, patient with expressive aphasia (some improvement noted), right hemiparesis  - BP management per NSGY, goal <160 in setting of recent infarct  - Outpatient follow up per NSGY/[facility]    Essential hypertension  - Vitals reviewed, BP management per NSGY        VTE Risk Mitigation (From admission, onward)         Ordered     heparin (porcine) injection 5,000 Units  Every 8 hours         02/03/22 0919                Melissa Cox PA-C  Neurology-Epilepsy  [facility] - Epilepsy ([facility])  Staff: Dr. Vasquez

## 2022-02-04 NOTE — PLAN OF CARE
[facility] - Neurosurgery (Hospital)  Discharge Reassessment    Primary Care Provider: Fadumo Laughlin MD    Expected Discharge Date: 2/7/2022     Patient is medically ready for discharge.  Per mother she is agreeable to [facility].  CM requested auth be submitted for.    Reassessment (most recent)     Discharge Reassessment - 02/04/22 1220        Discharge Reassessment    Assessment Type Discharge Planning Reassessment     Did the patient's condition or plan change since previous assessment? No     Discharge Plan discussed with: Parent(s)     Name(s) and Number(s) Karina lanza [phone]     Communicated LADI with patient/caregiver Yes     Discharge Plan A Rehab     Discharge Plan B Home with family;Home Health     DME Needed Upon Discharge  none     Discharge Barriers Identified None     Why the patient remains in the hospital Requires continued medical care        Post-Acute Status    Post-Acute Authorization Placement     Post-Acute Placement Status Pending payor review/awaiting authorization (if required)     Discharge Delays None known at this time

## 2022-02-04 NOTE — SUBJECTIVE & OBJECTIVE
Interval History: No acute events overnight, pending rehab placement/transfer. Mother at bedside.     Current Facility-Administered Medications   Medication Dose Route Frequency Provider Last Rate Last Admin    acetaminophen tablet 650 mg  650 mg Oral Q4H PRN nAderson Ac MD        dexamethasone injection 4 mg  4 mg Intravenous Q6H Anderson Ac MD   4 mg at 02/04/22 1236    dextrose 10% bolus 125 mL  12.5 g Intravenous PRN Luis San MD        dextrose 10% bolus 250 mL  25 g Intravenous PRN Luis San MD        famotidine tablet 20 mg  20 mg Oral BID Anderson Ac MD   20 mg at 02/04/22 0937    glucagon (human recombinant) injection 1 mg  1 mg Intramuscular PRN Ingrid Arnold PA-C        glucose chewable tablet 16 g  16 g Oral PRN Ingrid Arnold PA-C        glucose chewable tablet 24 g  24 g Oral PRN Ingrid Arnold PA-C        heparin (porcine) injection 5,000 Units  5,000 Units Subcutaneous Q8H Ingrid Arnold PA-C   5,000 Units at 02/04/22 0531    HYDROcodone-acetaminophen 5-325 mg per tablet 1 tablet  1 tablet Oral Q4H PRN Anderson Ac MD        insulin aspart U-100 pen 0-5 Units  0-5 Units Subcutaneous QID (AC + HS) PRN Ingrid Arnold PA-C        lactulose 20 gram/30 mL solution Soln 15 g  15 g Oral Once Garima Hoover PA-C        levetiracetam 500 mg/5 mL (5 mL) liquid Soln 1,000 mg  1,000 mg Oral BID Ingrid Arnold PA-C   1,000 mg at 02/04/22 0936    modafiniL tablet 100 mg  100 mg Oral QAM Ingrid Arnold PA-C   100 mg at 02/04/22 0609    mupirocin 2 % ointment   Nasal BID Anderson Ac MD   Given at 02/04/22 0939    ondansetron disintegrating tablet 8 mg  8 mg Oral Q8H PRN Anderson Ac MD        ondansetron injection 4 mg  4 mg Intravenous Q12H PRN Anderson Ac MD        polyethylene glycol packet 17 g  17 g Oral Daily Garima Hoover PA-C        senna-docusate 8.6-50 mg per  tablet 1 tablet  1 tablet Oral BID Ingrid Arnold PA-C   1 tablet at 02/04/22 0938    tamsulosin 24 hr capsule 0.4 mg  0.4 mg Oral Daily Garima Hoover PA-C   0.4 mg at 02/04/22 0937    topiramate tablet 200 mg  200 mg Oral BID Anderson Ac MD   200 mg at 02/04/22 0937    white petrolatum-mineral oil (SYSTANE NIGHTTIME) ophthalmic ointment   Right Eye BID Ingrid Arnold PA-C   Given at 02/04/22 0938     Continuous Infusions:    Review of Systems   Unable to perform ROS: Other     Objective:     Vital Signs (Most Recent):  Temp: 98.1 °F (36.7 °C) (02/04/22 0752)  Pulse: (!) 55 (02/04/22 0752)  Resp: 16 (02/04/22 0752)  BP: 112/69 (02/04/22 0752)  SpO2: 100 % (02/04/22 0752) Vital Signs (24h Range):  Temp:  [97.8 °F (36.6 °C)-98.7 °F (37.1 °C)] 98.1 °F (36.7 °C)  Pulse:  [55-85] 55  Resp:  [16-18] 16  SpO2:  [97 %-100 %] 100 %  BP: (108-124)/(65-79) 112/69     Weight: 118.8 kg (262 lb)  Body mass index is 43.6 kg/m².    Physical Exam  Vitals and nursing note reviewed.   Constitutional:       Appearance: She is not toxic-appearing or diaphoretic.   HENT:      Head:      Comments: Surgical dressing in place  Eyes:      General: No scleral icterus.     Conjunctiva/sclera: Conjunctivae normal.      Pupils: Pupils are equal, round, and reactive to light.   Cardiovascular:      Rate and Rhythm: Normal rate.   Pulmonary:      Effort: Pulmonary effort is normal. No respiratory distress.   Abdominal:      General: There is no distension.      Palpations: Abdomen is soft.   Musculoskeletal:         General: No deformity or signs of injury.      Cervical back: Neck supple. No rigidity.   Skin:     General: Skin is warm and dry.   Neurological:      Mental Status: She is alert.   Psychiatric:      Comments: Unable to assess         NEUROLOGICAL EXAMINATION:     MENTAL STATUS        Alert, tracks examiner       CRANIAL NERVES     CN III, IV, VI   Pupils are equal, round, and reactive to light.    CN VII  "  Right facial weakness: central       Some improvement noted to speech, able to repeat some words, state she is "doing good"  Some paraphasias noted       MOTOR EXAM        R hemiparesis  Spontaneous movement, follows commands on left       Significant Labs: All pertinent lab results from the past 24 hours have been reviewed.    Significant Studies: I have reviewed all pertinent imaging results/findings within the past 24 hours.  "

## 2022-02-04 NOTE — PLAN OF CARE
Problem: Adult Inpatient Plan of Care  Goal: Plan of Care Review  Outcome: Ongoing, Progressing  Flowsheets (Taken 2/3/2022 1816)  Plan of Care Reviewed With:   patient   mother  Goal: Optimal Comfort and Wellbeing  Outcome: Ongoing, Progressing  Intervention: Provide Person-Centered Care  Flowsheets (Taken 2/3/2022 1816)  Trust Relationship/Rapport:   care explained   questions encouraged   choices provided   empathic listening provided   questions answered   thoughts/feelings acknowledged   reassurance provided  POC reviewed with the patient and they verbalized understanding. All comments and concerns addressed. Bed locked in lowest position with bed alarm set, call light within reach. Safety precautions maintained.

## 2022-02-05 LAB
ALBUMIN SERPL BCP-MCNC: 2.9 G/DL (ref 3.5–5.2)
ALP SERPL-CCNC: 58 U/L (ref 55–135)
ALT SERPL W/O P-5'-P-CCNC: 12 U/L (ref 10–44)
ANION GAP SERPL CALC-SCNC: 4 MMOL/L (ref 8–16)
AST SERPL-CCNC: 13 U/L (ref 10–40)
BASOPHILS # BLD AUTO: 0.01 K/UL (ref 0–0.2)
BASOPHILS NFR BLD: 0.1 % (ref 0–1.9)
BILIRUB SERPL-MCNC: 0.1 MG/DL (ref 0.1–1)
BUN SERPL-MCNC: 14 MG/DL (ref 6–20)
CALCIUM SERPL-MCNC: 8.7 MG/DL (ref 8.7–10.5)
CHLORIDE SERPL-SCNC: 110 MMOL/L (ref 95–110)
CO2 SERPL-SCNC: 19 MMOL/L (ref 23–29)
CREAT SERPL-MCNC: 0.7 MG/DL (ref 0.5–1.4)
DIFFERENTIAL METHOD: ABNORMAL
EOSINOPHIL # BLD AUTO: 0 K/UL (ref 0–0.5)
EOSINOPHIL NFR BLD: 0 % (ref 0–8)
ERYTHROCYTE [DISTWIDTH] IN BLOOD BY AUTOMATED COUNT: 15.2 % (ref 11.5–14.5)
EST. GFR  (AFRICAN AMERICAN): >60 ML/MIN/1.73 M^2
EST. GFR  (NON AFRICAN AMERICAN): >60 ML/MIN/1.73 M^2
GLUCOSE SERPL-MCNC: 113 MG/DL (ref 70–110)
HCT VFR BLD AUTO: 33.8 % (ref 37–48.5)
HGB BLD-MCNC: 10.8 G/DL (ref 12–16)
IMM GRANULOCYTES # BLD AUTO: 0.03 K/UL (ref 0–0.04)
IMM GRANULOCYTES NFR BLD AUTO: 0.4 % (ref 0–0.5)
LYMPHOCYTES # BLD AUTO: 1.7 K/UL (ref 1–4.8)
LYMPHOCYTES NFR BLD: 20 % (ref 18–48)
MAGNESIUM SERPL-MCNC: 1.8 MG/DL (ref 1.6–2.6)
MCH RBC QN AUTO: 27.2 PG (ref 27–31)
MCHC RBC AUTO-ENTMCNC: 32 G/DL (ref 32–36)
MCV RBC AUTO: 85 FL (ref 82–98)
MONOCYTES # BLD AUTO: 0.5 K/UL (ref 0.3–1)
MONOCYTES NFR BLD: 5.6 % (ref 4–15)
NEUTROPHILS # BLD AUTO: 6.1 K/UL (ref 1.8–7.7)
NEUTROPHILS NFR BLD: 73.9 % (ref 38–73)
NRBC BLD-RTO: 0 /100 WBC
PHOSPHATE SERPL-MCNC: 3.1 MG/DL (ref 2.7–4.5)
PLATELET # BLD AUTO: 247 K/UL (ref 150–450)
PMV BLD AUTO: 11 FL (ref 9.2–12.9)
POCT GLUCOSE: 121 MG/DL (ref 70–110)
POCT GLUCOSE: 99 MG/DL (ref 70–110)
POTASSIUM SERPL-SCNC: 3.8 MMOL/L (ref 3.5–5.1)
PROT SERPL-MCNC: 6.6 G/DL (ref 6–8.4)
RBC # BLD AUTO: 3.97 M/UL (ref 4–5.4)
SODIUM SERPL-SCNC: 133 MMOL/L (ref 136–145)
WBC # BLD AUTO: 8.23 K/UL (ref 3.9–12.7)

## 2022-02-05 PROCEDURE — 85025 COMPLETE CBC W/AUTO DIFF WBC: CPT | Mod: 91 | Performed by: PHYSICIAN ASSISTANT

## 2022-02-05 PROCEDURE — 25000003 PHARM REV CODE 250

## 2022-02-05 PROCEDURE — 99024 PR POST-OP FOLLOW-UP VISIT: ICD-10-PCS | Mod: ,,, | Performed by: PHYSICIAN ASSISTANT

## 2022-02-05 PROCEDURE — 25000003 PHARM REV CODE 250: Performed by: PHYSICIAN ASSISTANT

## 2022-02-05 PROCEDURE — 80053 COMPREHEN METABOLIC PANEL: CPT

## 2022-02-05 PROCEDURE — 99024 POSTOP FOLLOW-UP VISIT: CPT | Mod: ,,, | Performed by: PHYSICIAN ASSISTANT

## 2022-02-05 PROCEDURE — 63600175 PHARM REV CODE 636 W HCPCS: Performed by: STUDENT IN AN ORGANIZED HEALTH CARE EDUCATION/TRAINING PROGRAM

## 2022-02-05 PROCEDURE — 84100 ASSAY OF PHOSPHORUS: CPT

## 2022-02-05 PROCEDURE — 11000001 HC ACUTE MED/SURG PRIVATE ROOM

## 2022-02-05 PROCEDURE — 36415 COLL VENOUS BLD VENIPUNCTURE: CPT

## 2022-02-05 PROCEDURE — 80048 BASIC METABOLIC PNL TOTAL CA: CPT | Performed by: PHYSICIAN ASSISTANT

## 2022-02-05 PROCEDURE — 63600175 PHARM REV CODE 636 W HCPCS

## 2022-02-05 PROCEDURE — 85025 COMPLETE CBC W/AUTO DIFF WBC: CPT

## 2022-02-05 PROCEDURE — 36415 COLL VENOUS BLD VENIPUNCTURE: CPT | Performed by: PHYSICIAN ASSISTANT

## 2022-02-05 PROCEDURE — 25000003 PHARM REV CODE 250: Performed by: STUDENT IN AN ORGANIZED HEALTH CARE EDUCATION/TRAINING PROGRAM

## 2022-02-05 PROCEDURE — 83735 ASSAY OF MAGNESIUM: CPT

## 2022-02-05 RX ORDER — BISACODYL 10 MG
10 SUPPOSITORY, RECTAL RECTAL DAILY PRN
Status: DISCONTINUED | OUTPATIENT
Start: 2022-02-05 | End: 2022-02-08 | Stop reason: HOSPADM

## 2022-02-05 RX ADMIN — BACITRACIN 1 EACH: 500 OINTMENT TOPICAL at 08:02

## 2022-02-05 RX ADMIN — LEVETIRACETAM 1000 MG: 500 SOLUTION ORAL at 08:02

## 2022-02-05 RX ADMIN — DEXAMETHASONE SODIUM PHOSPHATE 4 MG: 4 INJECTION INTRA-ARTICULAR; INTRALESIONAL; INTRAMUSCULAR; INTRAVENOUS; SOFT TISSUE at 11:02

## 2022-02-05 RX ADMIN — FAMOTIDINE 20 MG: 20 TABLET, FILM COATED ORAL at 08:02

## 2022-02-05 RX ADMIN — TOPIRAMATE 200 MG: 200 TABLET, FILM COATED ORAL at 08:02

## 2022-02-05 RX ADMIN — DOCUSATE SODIUM 50 MG AND SENNOSIDES 8.6 MG 1 TABLET: 8.6; 5 TABLET, FILM COATED ORAL at 08:02

## 2022-02-05 RX ADMIN — HEPARIN SODIUM 5000 UNITS: 5000 INJECTION INTRAVENOUS; SUBCUTANEOUS at 01:02

## 2022-02-05 RX ADMIN — MUPIROCIN: 20 OINTMENT TOPICAL at 11:02

## 2022-02-05 RX ADMIN — MINERAL OIL AND WHITE PETROLATUM: 30; 940 OINTMENT OPHTHALMIC at 11:02

## 2022-02-05 RX ADMIN — HEPARIN SODIUM 5000 UNITS: 5000 INJECTION INTRAVENOUS; SUBCUTANEOUS at 05:02

## 2022-02-05 RX ADMIN — BISACODYL 10 MG: 10 SUPPOSITORY RECTAL at 01:02

## 2022-02-05 RX ADMIN — POLYETHYLENE GLYCOL 3350 17 G: 17 POWDER, FOR SOLUTION ORAL at 08:02

## 2022-02-05 RX ADMIN — TAMSULOSIN HYDROCHLORIDE 0.4 MG: 0.4 CAPSULE ORAL at 08:02

## 2022-02-05 RX ADMIN — HEPARIN SODIUM 5000 UNITS: 5000 INJECTION INTRAVENOUS; SUBCUTANEOUS at 08:02

## 2022-02-05 RX ADMIN — MINERAL OIL AND WHITE PETROLATUM: 30; 940 OINTMENT OPHTHALMIC at 08:02

## 2022-02-05 RX ADMIN — DEXAMETHASONE SODIUM PHOSPHATE 4 MG: 4 INJECTION INTRA-ARTICULAR; INTRALESIONAL; INTRAMUSCULAR; INTRAVENOUS; SOFT TISSUE at 05:02

## 2022-02-05 RX ADMIN — MODAFINIL 100 MG: 100 TABLET ORAL at 07:02

## 2022-02-05 RX ADMIN — DEXAMETHASONE SODIUM PHOSPHATE 4 MG: 4 INJECTION INTRA-ARTICULAR; INTRALESIONAL; INTRAMUSCULAR; INTRAVENOUS; SOFT TISSUE at 12:02

## 2022-02-05 RX ADMIN — DEXAMETHASONE SODIUM PHOSPHATE 4 MG: 4 INJECTION INTRA-ARTICULAR; INTRALESIONAL; INTRAMUSCULAR; INTRAVENOUS; SOFT TISSUE at 06:02

## 2022-02-05 NOTE — PLAN OF CARE
Patient given a suppository for constipation; last bm was before admission for surgery (1/31).   Patient had 1 BM today.  Patient and pt's mother noted spontaneous movement of affected limbs. Pt's Right arm moved across the pt's chest, as if giving herself a hug. Pt's right foot moved minimally in a jerking motion. The patient is unable to move the affected limbs on command with intent.     POC reviewed and updated with the patient/caregiver. Questions regarding POC were encouraged and addressed with the patient/caregiver.  VSS, see flow-sheets. Patient is AO X 3 at this time, disoriented to time. Fall/safety precautions maintained, no signs of injury noted during shift. Patient was repositioned for comfort, bed locked in low position with side rails X 4, bed alarm set, with call light within reach. Patient instructed to call staff for needs, verbalized understanding. No acute signs of distress noted at this time.      Problem: Adult Inpatient Plan of Care  Goal: Plan of Care Review  Outcome: Ongoing, Progressing  Flowsheets (Taken 2/5/2022 1606)  Plan of Care Reviewed With:   patient   mother  Goal: Absence of Hospital-Acquired Illness or Injury  Outcome: Ongoing, Progressing  Intervention: Prevent Skin Injury  Flowsheets (Taken 2/5/2022 1606)  Body Position:   weight shifting   position changed independently  Skin Protection:   adhesive use limited   incontinence pads utilized   silicone foam dressing in place   tubing/devices free from skin contact  Intervention: Prevent and Manage VTE (Venous Thromboembolism) Risk  Flowsheets (Taken 2/5/2022 1606)  VTE Prevention/Management:   remove, assess skin, and reapply sequential compression device   fluids promoted  Goal: Optimal Comfort and Wellbeing  Outcome: Ongoing, Progressing  Intervention: Monitor Pain and Promote Comfort  Flowsheets (Taken 2/5/2022 1606)  Pain Management Interventions:   care clustered   diversional activity provided   pillow support  provided   position adjusted   pain management plan reviewed with patient/caregiver   quiet environment facilitated  Intervention: Provide Person-Centered Care  Flowsheets (Taken 2/5/2022 1601)  Trust Relationship/Rapport:   care explained   choices provided   questions answered   questions encouraged   thoughts/feelings acknowledged

## 2022-02-05 NOTE — SUBJECTIVE & OBJECTIVE
Interval History: NAEON. Patient neuro stable with right hemiparesis. Remove smith tomorrow for voiding trial. Continue aggressive therapy. PT/OT recommending IPR. Patient medically stable for discharge to IPR pending placement, likely Monday.     Medications:  Continuous Infusions:  Scheduled Meds:   bacitracin zinc   Topical (Top) BID    dexamethasone  4 mg Intravenous Q6H    famotidine  20 mg Oral BID    heparin (porcine)  5,000 Units Subcutaneous Q8H    levetiracetam  1,000 mg Oral BID    modafiniL  100 mg Oral QAM    mupirocin   Nasal BID    polyethylene glycol  17 g Oral Daily    senna-docusate 8.6-50 mg  1 tablet Oral BID    tamsulosin  0.4 mg Oral Daily    topiramate  200 mg Oral BID    white petrolatum-mineral oiL   Right Eye BID     PRN Meds:acetaminophen, dextrose 10%, dextrose 10%, glucagon (human recombinant), glucose, glucose, HYDROcodone-acetaminophen, insulin aspart U-100, ondansetron, ondansetron     Review of Systems  Objective:     Weight: 118.8 kg (262 lb)  Body mass index is 43.6 kg/m².  Vital Signs (Most Recent):  Temp: 97.8 °F (36.6 °C) (02/05/22 0800)  Pulse: (!) 55 (02/05/22 0800)  Resp: 20 (02/05/22 0800)  BP: 108/69 (02/05/22 0800)  SpO2: 96 % (02/05/22 0800) Vital Signs (24h Range):  Temp:  [96.4 °F (35.8 °C)-98 °F (36.7 °C)] 97.8 °F (36.6 °C)  Pulse:  [55-80] 55  Resp:  [16-21] 20  SpO2:  [96 %-98 %] 96 %  BP: ()/(59-75) 108/69     Date 02/05/22 0700 - 02/06/22 0659   Shift 2231-2264 6655-2115 8223-7880 24 Hour Total   INTAKE   Shift Total(mL/kg)       OUTPUT   Urine(mL/kg/hr) 250   250   Shift Total(mL/kg) 250(2.1)   250(2.1)   Weight (kg) 118.8 118.8 118.8 118.8                        Urethral Catheter 02/03/22 1709 Silicone (Active)   $ Smith Insertion Complete 02/04/22 2000   Site Assessment Clean;Intact 02/05/22 0800   Collection Container Urimeter 02/05/22 0800   Securement Method secured to top of thigh w/ adhesive device 02/05/22 0800   Catheter Care Performed  "yes 02/05/22 0800   Reason for Continuing Urinary Catheterization Urinary retention 02/05/22 0800   CAUTI Prevention Bundle StatLock in place w 1" slack;Intact seal between catheter & drainage tubing;Drainage bag/urimeter off the floor;Sheeting clip in use;No dependent loops or kinks;Drainage bag/urimeter not overfilled (<2/3 full);Drainage bag/urimeter below bladder 02/05/22 0800   Output (mL) 250 mL 02/05/22 0800       Neurosurgery Physical Exam  General: well developed, well nourished, no distress.   Head: normocephalic,   Neurologic: Alert  Mental Status: Awake, Alert, Oriented x 2 (person, place - not oriented to year)  Language: expressive aphasia  Speech: dysarthric  Cranial nerves: right facial droop  Eyes:  EOMI.   Pulmonary: normal respirations, no signs of respiratory distress  Sensory: intact to light touch throughout  Motor Strength:No abnormal movements seen. Left side full strength. Plegic right side.  Vascular: No LE edema.   Skin: Skin is warm, dry and intact.  Incision: c/d/i with skin edges well approximated with staples. No surrounding erythema or edema. No drainage from incision. No palpable hematoma or underlying fluid collection.    Significant Labs:  Recent Labs   Lab 02/04/22  0348 02/05/22  0457   * 113*    133*   K 4.7 3.8   * 110   CO2 14* 19*   BUN 10 14   CREATININE 0.6 0.7   CALCIUM 8.6* 8.7   MG 1.8 1.8     Recent Labs   Lab 02/04/22 0348 02/05/22  0457   WBC 9.83 8.23   HGB 11.5* 10.8*   HCT 37.3 33.8*    247     No results for input(s): LABPT, INR, APTT in the last 48 hours.  Microbiology Results (last 7 days)     ** No results found for the last 168 hours. **        All pertinent labs from the last 24 hours have been reviewed.    Significant Diagnostics:  I have reviewed all pertinent imaging results/findings within the past 24 hours.  "

## 2022-02-05 NOTE — PROGRESS NOTES
Tyrel Oneal - Neurosurgery ([facility])  Neurosurgery  Progress Note    Subjective:     History of Present Illness: Pt is a 31F with intractable epilepsy who presents on 1/31 for elective left anterior temporal lobectomy for refractory epilepsy.     Dr. Iraheta's Clinic Note (1/20/22):  Carolyn Mccullough is a 31 y.o. female who presents as a referral from Dr. Stapleton to discuss definitive surgery for treatment of intractable epilepsy. She has undergone extensive workup at [facility] [place], and Dr. Stapleton has shared all relevant records with us, including raw sEEG data and neuropsycholgical evaluation. We have also directly obtained the images, including post-sEEG CT head, to assess the electrode placement.      Based on these data, together with the additional imaging findings, and after extensive interdisciplinary discussion in conference and with Dr. Stapleton, I have offered the patient a choice between laser ablation of the amygdala and hippocampus or anterior temporal lobectomy. 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 There is a growing body of literature suggesting that encephaloceles are potentially epileptogenic; however, without actually having an electrode in the region, I cannot be sure if it is contributory in her case.      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 We discussed that this will carry an increased risk of neuropsychological risk, particularly with respect to speech. The patient and her mother expressed understanding. After deliberation, they prefer to proceed with KERLINE      Post-Op Info:  Procedure(s) (LRB):  LEFT CRANIOTOMY FOR TEMPORAL LOBECTOMY WITH BRAINLAB (Left)   5 Days  Post-Op     Interval History: NAEON. Patient neuro stable with right hemiparesis. Remove smith tomorrow for voiding trial. Continue aggressive therapy. PT/OT recommending IPR. Patient medically stable for discharge to IPR pending placement, likely Monday.     Medications:  Continuous Infusions:  Scheduled Meds:   bacitracin zinc   Topical (Top) BID    dexamethasone  4 mg Intravenous Q6H    famotidine  20 mg Oral BID    heparin (porcine)  5,000 Units Subcutaneous Q8H    levetiracetam  1,000 mg Oral BID    modafiniL  100 mg Oral QAM    mupirocin   Nasal BID    polyethylene glycol  17 g Oral Daily    senna-docusate 8.6-50 mg  1 tablet Oral BID    tamsulosin  0.4 mg Oral Daily    topiramate  200 mg Oral BID    white petrolatum-mineral oiL   Right Eye BID     PRN Meds:acetaminophen, dextrose 10%, dextrose 10%, glucagon (human recombinant), glucose, glucose, HYDROcodone-acetaminophen, insulin aspart U-100, ondansetron, ondansetron     Review of Systems  Objective:     Weight: 118.8 kg (262 lb)  Body mass index is 43.6 kg/m².  Vital Signs (Most Recent):  Temp: 97.8 °F (36.6 °C) (02/05/22 0800)  Pulse: (!) 55 (02/05/22 0800)  Resp: 20 (02/05/22 0800)  BP: 108/69 (02/05/22 0800)  SpO2: 96 % (02/05/22 0800) Vital Signs (24h Range):  Temp:  [96.4 °F (35.8 °C)-98 °F (36.7 °C)] 97.8 °F (36.6 °C)  Pulse:  [55-80] 55  Resp:  [16-21] 20  SpO2:  [96 %-98 %] 96 %  BP: ()/(59-75) 108/69     Date 02/05/22 0700 - 02/06/22 0659   Shift 7366-2408 1879-9975 4326-0813 24 Hour Total   INTAKE   Shift Total(mL/kg)       OUTPUT   Urine(mL/kg/hr) 250   250   Shift Total(mL/kg) 250(2.1)   250(2.1)   Weight (kg) 118.8 118.8 118.8 118.8                        Urethral Catheter 02/03/22 1709 Silicone (Active)   $ Smith Insertion Complete 02/04/22 2000   Site Assessment Clean;Intact 02/05/22 0800   Collection Container Urimeter 02/05/22 0800   Securement Method secured to top of thigh w/ adhesive device 02/05/22 0800   Catheter Care  "Performed yes 02/05/22 0800   Reason for Continuing Urinary Catheterization Urinary retention 02/05/22 0800   CAUTI Prevention Bundle StatLock in place w 1" slack;Intact seal between catheter & drainage tubing;Drainage bag/urimeter off the floor;Sheeting clip in use;No dependent loops or kinks;Drainage bag/urimeter not overfilled (<2/3 full);Drainage bag/urimeter below bladder 02/05/22 0800   Output (mL) 250 mL 02/05/22 0800       Neurosurgery Physical Exam  General: well developed, well nourished, no distress.   Head: normocephalic,   Neurologic: Alert  Mental Status: Awake, Alert, Oriented x 2 (person, place - not oriented to year)  Language: expressive aphasia  Speech: dysarthric  Cranial nerves: right facial droop  Eyes:  EOMI.   Pulmonary: normal respirations, no signs of respiratory distress  Sensory: intact to light touch throughout  Motor Strength:No abnormal movements seen. Left side full strength. Plegic right side.  Vascular: No LE edema.   Skin: Skin is warm, dry and intact.  Incision: c/d/i with skin edges well approximated with staples. No surrounding erythema or edema. No drainage from incision. No palpable hematoma or underlying fluid collection.    Significant Labs:  Recent Labs   Lab 02/04/22  0348 02/05/22  0457   * 113*    133*   K 4.7 3.8   * 110   CO2 14* 19*   BUN 10 14   CREATININE 0.6 0.7   CALCIUM 8.6* 8.7   MG 1.8 1.8     Recent Labs   Lab 02/04/22 0348 02/05/22  0457   WBC 9.83 8.23   HGB 11.5* 10.8*   HCT 37.3 33.8*    247     No results for input(s): LABPT, INR, APTT in the last 48 hours.  Microbiology Results (last 7 days)     ** No results found for the last 168 hours. **        All pertinent labs from the last 24 hours have been reviewed.    Significant Diagnostics:  I have reviewed all pertinent imaging results/findings within the past 24 hours.    Assessment/Plan:     * Localization-related (focal) (partial) symptomatic epilepsy and epileptic syndromes " with complex partial seizures, intractable, without status epilepticus  Pt is 31F with temporal epilepsy who is now s/p left craniotomy for anterior temporal lobectomy 2/1:    --Admitted to floor care   -Q4h neurochecks   -- All diagnostics reviewed   -CTH post op satisfactory   -MRI brain 2/1 with expected changes as well as acute left basal ganglia infarct   -CTA 2/1 redemonstrated left basal ganglia infarct without hemorrhagic conversion  --Continue dex 4q6, GI PPx while on steroids  --Continue home AEDs keppra and topiramate  --Aggressive PT/OT/SLP   --DVT ppx: TEDs/SCDs/SQH  --continue modafinal   --HTN: PRN labetalol, hydralazine. SBP <160.   --Bowel regimen: Daily Miralax. Lactulose tday. CTM for bowel movement.  --Urinary: Smith replaced 2/3. Flomax started. Plan for smith removal Sunday.    Dispo: PT/OT recs for rehab. Pending placement.        Licha Barnhart PA-C  Neurosurgery  Tyrel Oneal - Neurosurgery ([facility])

## 2022-02-05 NOTE — PLAN OF CARE
Problem: Adult Inpatient Plan of Care  Goal: Optimal Comfort and Wellbeing  Outcome: Ongoing, Progressing     Problem: Infection  Goal: Absence of Infection Signs and Symptoms  Outcome: Ongoing, Progressing     Problem: Fall Injury Risk  Goal: Absence of Fall and Fall-Related Injury  Outcome: Ongoing, Progressing   POC reviewed with patient/caregiver, all questions/concerns addressed and encouraged. No s/sx of distress noted. VSS, assesments completed. Remains AOx4, delayed responses at times. Bridges remains in place d/t Urinary Retention. Assisted with repositioning for comfort/pressure relief. Staple line to left side of head is well approximated with no s/sx of infection noted.

## 2022-02-06 LAB
ANION GAP SERPL CALC-SCNC: 8 MMOL/L (ref 8–16)
BASOPHILS # BLD AUTO: 0.01 K/UL (ref 0–0.2)
BASOPHILS NFR BLD: 0.1 % (ref 0–1.9)
BUN SERPL-MCNC: 14 MG/DL (ref 6–20)
CALCIUM SERPL-MCNC: 8.8 MG/DL (ref 8.7–10.5)
CHLORIDE SERPL-SCNC: 112 MMOL/L (ref 95–110)
CO2 SERPL-SCNC: 16 MMOL/L (ref 23–29)
CREAT SERPL-MCNC: 0.7 MG/DL (ref 0.5–1.4)
DIFFERENTIAL METHOD: ABNORMAL
EOSINOPHIL # BLD AUTO: 0 K/UL (ref 0–0.5)
EOSINOPHIL NFR BLD: 0 % (ref 0–8)
ERYTHROCYTE [DISTWIDTH] IN BLOOD BY AUTOMATED COUNT: 15.2 % (ref 11.5–14.5)
EST. GFR  (AFRICAN AMERICAN): >60 ML/MIN/1.73 M^2
EST. GFR  (NON AFRICAN AMERICAN): >60 ML/MIN/1.73 M^2
GLUCOSE SERPL-MCNC: 133 MG/DL (ref 70–110)
HCT VFR BLD AUTO: 36.2 % (ref 37–48.5)
HGB BLD-MCNC: 11.4 G/DL (ref 12–16)
IMM GRANULOCYTES # BLD AUTO: 0.08 K/UL (ref 0–0.04)
IMM GRANULOCYTES NFR BLD AUTO: 0.8 % (ref 0–0.5)
LYMPHOCYTES # BLD AUTO: 1.9 K/UL (ref 1–4.8)
LYMPHOCYTES NFR BLD: 18 % (ref 18–48)
MCH RBC QN AUTO: 26.7 PG (ref 27–31)
MCHC RBC AUTO-ENTMCNC: 31.5 G/DL (ref 32–36)
MCV RBC AUTO: 85 FL (ref 82–98)
MONOCYTES # BLD AUTO: 0.8 K/UL (ref 0.3–1)
MONOCYTES NFR BLD: 7.4 % (ref 4–15)
NEUTROPHILS # BLD AUTO: 7.9 K/UL (ref 1.8–7.7)
NEUTROPHILS NFR BLD: 73.7 % (ref 38–73)
NRBC BLD-RTO: 0 /100 WBC
PLATELET # BLD AUTO: 252 K/UL (ref 150–450)
PMV BLD AUTO: 10.8 FL (ref 9.2–12.9)
POCT GLUCOSE: 102 MG/DL (ref 70–110)
POCT GLUCOSE: 109 MG/DL (ref 70–110)
POTASSIUM SERPL-SCNC: 3.4 MMOL/L (ref 3.5–5.1)
RBC # BLD AUTO: 4.27 M/UL (ref 4–5.4)
SODIUM SERPL-SCNC: 136 MMOL/L (ref 136–145)
WBC # BLD AUTO: 10.66 K/UL (ref 3.9–12.7)

## 2022-02-06 PROCEDURE — 11000001 HC ACUTE MED/SURG PRIVATE ROOM

## 2022-02-06 PROCEDURE — 25000003 PHARM REV CODE 250

## 2022-02-06 PROCEDURE — 25000003 PHARM REV CODE 250: Performed by: STUDENT IN AN ORGANIZED HEALTH CARE EDUCATION/TRAINING PROGRAM

## 2022-02-06 PROCEDURE — 63600175 PHARM REV CODE 636 W HCPCS: Performed by: STUDENT IN AN ORGANIZED HEALTH CARE EDUCATION/TRAINING PROGRAM

## 2022-02-06 PROCEDURE — 63600175 PHARM REV CODE 636 W HCPCS

## 2022-02-06 PROCEDURE — 25000003 PHARM REV CODE 250: Performed by: PHYSICIAN ASSISTANT

## 2022-02-06 RX ADMIN — DOCUSATE SODIUM 50 MG AND SENNOSIDES 8.6 MG 1 TABLET: 8.6; 5 TABLET, FILM COATED ORAL at 09:02

## 2022-02-06 RX ADMIN — HEPARIN SODIUM 5000 UNITS: 5000 INJECTION INTRAVENOUS; SUBCUTANEOUS at 09:02

## 2022-02-06 RX ADMIN — FAMOTIDINE 20 MG: 20 TABLET, FILM COATED ORAL at 09:02

## 2022-02-06 RX ADMIN — DEXAMETHASONE SODIUM PHOSPHATE 4 MG: 4 INJECTION INTRA-ARTICULAR; INTRALESIONAL; INTRAMUSCULAR; INTRAVENOUS; SOFT TISSUE at 06:02

## 2022-02-06 RX ADMIN — DOCUSATE SODIUM 50 MG AND SENNOSIDES 8.6 MG 1 TABLET: 8.6; 5 TABLET, FILM COATED ORAL at 08:02

## 2022-02-06 RX ADMIN — LEVETIRACETAM 1000 MG: 500 SOLUTION ORAL at 09:02

## 2022-02-06 RX ADMIN — TOPIRAMATE 200 MG: 200 TABLET, FILM COATED ORAL at 08:02

## 2022-02-06 RX ADMIN — MINERAL OIL AND WHITE PETROLATUM: 30; 940 OINTMENT OPHTHALMIC at 08:02

## 2022-02-06 RX ADMIN — MINERAL OIL AND WHITE PETROLATUM: 30; 940 OINTMENT OPHTHALMIC at 10:02

## 2022-02-06 RX ADMIN — FAMOTIDINE 20 MG: 20 TABLET, FILM COATED ORAL at 08:02

## 2022-02-06 RX ADMIN — BACITRACIN 1 EACH: 500 OINTMENT TOPICAL at 08:02

## 2022-02-06 RX ADMIN — DEXAMETHASONE SODIUM PHOSPHATE 4 MG: 4 INJECTION INTRA-ARTICULAR; INTRALESIONAL; INTRAMUSCULAR; INTRAVENOUS; SOFT TISSUE at 11:02

## 2022-02-06 RX ADMIN — DEXAMETHASONE SODIUM PHOSPHATE 4 MG: 4 INJECTION INTRA-ARTICULAR; INTRALESIONAL; INTRAMUSCULAR; INTRAVENOUS; SOFT TISSUE at 05:02

## 2022-02-06 RX ADMIN — BACITRACIN 1 EACH: 500 OINTMENT TOPICAL at 09:02

## 2022-02-06 RX ADMIN — MODAFINIL 100 MG: 100 TABLET ORAL at 08:02

## 2022-02-06 RX ADMIN — POLYETHYLENE GLYCOL 3350 17 G: 17 POWDER, FOR SOLUTION ORAL at 08:02

## 2022-02-06 RX ADMIN — HEPARIN SODIUM 5000 UNITS: 5000 INJECTION INTRAVENOUS; SUBCUTANEOUS at 06:02

## 2022-02-06 RX ADMIN — TOPIRAMATE 200 MG: 200 TABLET, FILM COATED ORAL at 09:02

## 2022-02-06 RX ADMIN — LEVETIRACETAM 1000 MG: 500 SOLUTION ORAL at 08:02

## 2022-02-06 RX ADMIN — TAMSULOSIN HYDROCHLORIDE 0.4 MG: 0.4 CAPSULE ORAL at 08:02

## 2022-02-06 RX ADMIN — HEPARIN SODIUM 5000 UNITS: 5000 INJECTION INTRAVENOUS; SUBCUTANEOUS at 02:02

## 2022-02-06 NOTE — NURSING
Yuliana dc'd at 0640, 300 cloudy yellow urine in Urimeter, small amount of sedemet noted. PO fluids encouraged and explained to both patient/family that at times it can take up to 8 hours for someone to void, if she has not voided she can be bladder scanned and if she has over 350cc in bladder nurse will have to straight cath her. If we have to st cath for urinary retention times 3, we normally have to reinsert smith. Both voiced their understanding.

## 2022-02-06 NOTE — PLAN OF CARE
Problem: Adult Inpatient Plan of Care  Goal: Optimal Comfort and Wellbeing  Outcome: Ongoing, Progressing     Problem: Fall Injury Risk  Goal: Absence of Fall and Fall-Related Injury  Outcome: Ongoing, Progressing     Problem: Skin Injury Risk Increased  Goal: Skin Health and Integrity  Outcome: Ongoing, Progressing     Problem: Communication Impairment (Stroke, Ischemic/Transient Ischemic Attack)  Goal: Improved Communication Skills  Outcome: Ongoing, Progressing   POC was reviewed with patient and family who is at bedside. Patient presents as more awake and alert today but still having issues with word finding/word salad. Incision to left side of head is intact, staples well approximated with no s/sx of infection noted.

## 2022-02-06 NOTE — ASSESSMENT & PLAN NOTE
Pt is 31F with temporal epilepsy who is now s/p left craniotomy for anterior temporal lobectomy 2/1:    --Admitted to floor care   -Q4h neurochecks   -- All diagnostics reviewed   -CTH post op satisfactory   -MRI brain 2/1 with expected changes as well as acute left basal ganglia infarct   -CTA 2/1 redemonstrated left basal ganglia infarct without hemorrhagic conversion  --Continue dex 4q6, GI PPx while on steroids  --Continue home AEDs keppra and topiramate  --Aggressive PT/OT/SLP   --DVT ppx: TEDs/SCDs/SQH  --continue modafinal   --HTN: PRN labetalol, hydralazine. SBP <160.   --Bowel regimen: Daily Miralax. Lactulose tday. CTM for bowel movement.  --Urinary: Bridges replaced 2/3. Flomax started. Bridges removed Sunday morning    Dispo: PT/OT recs for rehab. Pending placement.

## 2022-02-06 NOTE — SUBJECTIVE & OBJECTIVE
"Interval History: 2/6 naeon, exam stable, smith removed this am. Pending placement.     Medications:  Continuous Infusions:  Scheduled Meds:   bacitracin zinc   Topical (Top) BID    dexamethasone  4 mg Intravenous Q6H    famotidine  20 mg Oral BID    heparin (porcine)  5,000 Units Subcutaneous Q8H    levetiracetam  1,000 mg Oral BID    modafiniL  100 mg Oral QAM    polyethylene glycol  17 g Oral Daily    senna-docusate 8.6-50 mg  1 tablet Oral BID    tamsulosin  0.4 mg Oral Daily    topiramate  200 mg Oral BID    white petrolatum-mineral oiL   Right Eye BID     PRN Meds:acetaminophen, bisacodyL, dextrose 10%, dextrose 10%, glucagon (human recombinant), glucose, glucose, HYDROcodone-acetaminophen, insulin aspart U-100, ondansetron, ondansetron     Review of Systems    Objective:     Weight: 118.8 kg (262 lb)  Body mass index is 43.6 kg/m².  Vital Signs (Most Recent):  Temp: 98.3 °F (36.8 °C) (02/05/22 2330)  Pulse: 90 (02/05/22 2330)  Resp: 18 (02/05/22 2330)  BP: 124/77 (02/05/22 2330)  SpO2: 97 % (02/05/22 2330) Vital Signs (24h Range):  Temp:  [97.7 °F (36.5 °C)-98.3 °F (36.8 °C)] 98.3 °F (36.8 °C)  Pulse:  [55-94] 90  Resp:  [18-20] 18  SpO2:  [96 %-98 %] 97 %  BP: (108-124)/(69-80) 124/77                          Urethral Catheter 02/03/22 1709 Silicone (Active)   $ Smith Insertion Complete 02/04/22 2000   Site Assessment Clean;Intact 02/05/22 0800   Collection Container Urimeter 02/05/22 0800   Securement Method secured to top of thigh w/ adhesive device 02/05/22 0800   Catheter Care Performed yes 02/05/22 0800   Reason for Continuing Urinary Catheterization Urinary retention 02/05/22 0800   CAUTI Prevention Bundle StatLock in place w 1" slack;Intact seal between catheter & drainage tubing;Drainage bag/urimeter off the floor;Sheeting clip in use;No dependent loops or kinks;Drainage bag/urimeter not overfilled (<2/3 full);Drainage bag/urimeter below bladder 02/05/22 0800   Output (mL) 250 mL 02/05/22 " 0800       Neurosurgery Physical Exam    General: well developed, well nourished, no distress.   Head: normocephalic,   Neurologic: Alert  Mental Status: Awake, Alert, Oriented x 2 (person, place - not oriented to year)  Language: expressive aphasia  Speech: dysarthric  Cranial nerves: right facial droop  Eyes:  EOMI.   Pulmonary: normal respirations, no signs of respiratory distress  Sensory: intact to light touch throughout  Motor Strength:No abnormal movements seen. Left side full strength. Plegic right side.  Vascular: No LE edema.   Skin: Skin is warm, dry and intact.  Incision: c/d/i with skin edges well approximated with staples. No surrounding erythema or edema. No drainage from incision. No palpable hematoma or underlying fluid collection.    Significant Labs:  Recent Labs   Lab 02/05/22 0457 02/05/22  2352   * 133*   * 136   K 3.8 3.4*    112*   CO2 19* 16*   BUN 14 14   CREATININE 0.7 0.7   CALCIUM 8.7 8.8   MG 1.8  --      Recent Labs   Lab 02/05/22 0457 02/05/22  2352   WBC 8.23 10.66   HGB 10.8* 11.4*   HCT 33.8* 36.2*    252     No results for input(s): LABPT, INR, APTT in the last 48 hours.  Microbiology Results (last 7 days)     ** No results found for the last 168 hours. **        All pertinent labs from the last 24 hours have been reviewed.    Significant Diagnostics:  I have reviewed all pertinent imaging results/findings within the past 24 hours.    Physical Exam

## 2022-02-06 NOTE — PLAN OF CARE
Problem: Skin Injury Risk Increased  Goal: Skin Health and Integrity  Outcome: Ongoing, Progressing     Problem: Adjustment to Illness (Stroke, Ischemic/Transient Ischemic Attack)  Goal: Optimal Coping  Outcome: Ongoing, Progressing     Problem: Cognitive Impairment (Stroke, Ischemic/Transient Ischemic Attack)  Goal: Optimal Cognitive Function  Outcome: Ongoing, Progressing     Plan of care reviewed with patient and family. Pt A&Ox3 with trouble finding words. Stapled incision is clean and intact. No new s/s of infection. Voided post smith removal, purewick in place.

## 2022-02-06 NOTE — PROGRESS NOTES
Tyrel Oneal - Neurosurgery ([facility])  Neurosurgery  Progress Note    Subjective:     History of Present Illness: Pt is a 31F with intractable epilepsy who presents on 1/31 for elective left anterior temporal lobectomy for refractory epilepsy.     Dr. Iraheta's Clinic Note (1/20/22):  Carolyn Mccullough is a 31 y.o. female who presents as a referral from Dr. Stapleton to discuss definitive surgery for treatment of intractable epilepsy. She has undergone extensive workup at [facility], and Dr. Stapleton has shared all relevant records with us, including raw sEEG data and neuropsycholgical evaluation. We have also directly obtained the images, including post-sEEG CT head, to assess the electrode placement.      Based on these data, together with the additional imaging findings, and after extensive interdisciplinary discussion in conference and with Dr. Stapleton, I have offered the patient a choice between laser ablation of the amygdala and hippocampus or anterior temporal lobectomy. 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 There is a growing body of literature suggesting that encephaloceles are potentially epileptogenic; however, without actually having an electrode in the region, I cannot be sure if it is contributory in her case.      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 We discussed that this will carry an increased risk of neuropsychological risk, particularly with respect to speech. The patient and her mother expressed understanding. After deliberation, they prefer to proceed with KERLINE      Post-Op Info:  Procedure(s) (LRB):  LEFT CRANIOTOMY FOR TEMPORAL LOBECTOMY WITH BRAINLAB (Left)   6 Days  "Post-Op     Interval History: 2/6 naeon, exam stable, smith removed this am. Pending placement.     Medications:  Continuous Infusions:  Scheduled Meds:   bacitracin zinc   Topical (Top) BID    dexamethasone  4 mg Intravenous Q6H    famotidine  20 mg Oral BID    heparin (porcine)  5,000 Units Subcutaneous Q8H    levetiracetam  1,000 mg Oral BID    modafiniL  100 mg Oral QAM    polyethylene glycol  17 g Oral Daily    senna-docusate 8.6-50 mg  1 tablet Oral BID    tamsulosin  0.4 mg Oral Daily    topiramate  200 mg Oral BID    white petrolatum-mineral oiL   Right Eye BID     PRN Meds:acetaminophen, bisacodyL, dextrose 10%, dextrose 10%, glucagon (human recombinant), glucose, glucose, HYDROcodone-acetaminophen, insulin aspart U-100, ondansetron, ondansetron     Review of Systems    Objective:     Weight: 118.8 kg (262 lb)  Body mass index is 43.6 kg/m².  Vital Signs (Most Recent):  Temp: 98.3 °F (36.8 °C) (02/05/22 2330)  Pulse: 90 (02/05/22 2330)  Resp: 18 (02/05/22 2330)  BP: 124/77 (02/05/22 2330)  SpO2: 97 % (02/05/22 2330) Vital Signs (24h Range):  Temp:  [97.7 °F (36.5 °C)-98.3 °F (36.8 °C)] 98.3 °F (36.8 °C)  Pulse:  [55-94] 90  Resp:  [18-20] 18  SpO2:  [96 %-98 %] 97 %  BP: (108-124)/(69-80) 124/77                          Urethral Catheter 02/03/22 1709 Silicone (Active)   $ Smith Insertion Complete 02/04/22 2000   Site Assessment Clean;Intact 02/05/22 0800   Collection Container Urimeter 02/05/22 0800   Securement Method secured to top of thigh w/ adhesive device 02/05/22 0800   Catheter Care Performed yes 02/05/22 0800   Reason for Continuing Urinary Catheterization Urinary retention 02/05/22 0800   CAUTI Prevention Bundle StatLock in place w 1" slack;Intact seal between catheter & drainage tubing;Drainage bag/urimeter off the floor;Sheeting clip in use;No dependent loops or kinks;Drainage bag/urimeter not overfilled (<2/3 full);Drainage bag/urimeter below bladder 02/05/22 0800   Output (mL) 250 "  02/05/22 0800       Neurosurgery Physical Exam    General: well developed, well nourished, no distress.   Head: normocephalic,   Neurologic: Alert  Mental Status: Awake, Alert, Oriented x 2 (person, place - not oriented to year)  Language: expressive aphasia  Speech: dysarthric  Cranial nerves: right facial droop  Eyes:  EOMI.   Pulmonary: normal respirations, no signs of respiratory distress  Sensory: intact to light touch throughout  Motor Strength:No abnormal movements seen. Left side full strength. Plegic right side.  Vascular: No LE edema.   Skin: Skin is warm, dry and intact.  Incision: c/d/i with skin edges well approximated with staples. No surrounding erythema or edema. No drainage from incision. No palpable hematoma or underlying fluid collection.    Significant Labs:  Recent Labs   Lab 02/05/22 0457 02/05/22  2352   * 133*   * 136   K 3.8 3.4*    112*   CO2 19* 16*   BUN 14 14   CREATININE 0.7 0.7   CALCIUM 8.7 8.8   MG 1.8  --      Recent Labs   Lab 02/05/22 0457 02/05/22  2352   WBC 8.23 10.66   HGB 10.8* 11.4*   HCT 33.8* 36.2*    252     No results for input(s): LABPT, INR, APTT in the last 48 hours.  Microbiology Results (last 7 days)     ** No results found for the last 168 hours. **        All pertinent labs from the last 24 hours have been reviewed.    Significant Diagnostics:  I have reviewed all pertinent imaging results/findings within the past 24 hours.    Physical Exam        Assessment/Plan:     * Localization-related (focal) (partial) symptomatic epilepsy and epileptic syndromes with complex partial seizures, intractable, without status epilepticus  Pt is 31F with temporal epilepsy who is now s/p left craniotomy for anterior temporal lobectomy 2/1:    --Admitted to floor care   -Q4h neurochecks   -- All diagnostics reviewed   -CTH post op satisfactory   -MRI brain 2/1 with expected changes as well as acute left basal ganglia infarct   -CTA 2/1 redemonstrated  left basal ganglia infarct without hemorrhagic conversion  --Continue dex 4q6, GI PPx while on steroids  --Continue home AEDs keppra and topiramate  --Aggressive PT/OT/SLP   --DVT ppx: TEDs/SCDs/SQH  --continue modafinal   --HTN: PRN labetalol, hydralazine. SBP <160.   --Bowel regimen: Daily Miralax. Lactulose tday. CTM for bowel movement.  --Urinary: Bridges replaced 2/3. Flomax started. Bridges removed Sunday morning    Dispo: PT/OT recs for rehab. Pending placement.        Ian Vega MD  Neurosurgery  Tyrel Oneal - Neurosurgery ([facility])

## 2022-02-06 NOTE — NURSING
Nursing Rounds: Patient is AAOx3 with no s/sx of distress noted. HOB elevated, bed in lowest position with SR's up times 3. Call light in place and bed alarm activated for safety purposes. Mother remains at bedside. Took meds whole in pudding w/o difficulty. No spontaneous movement of effected limbs noted and could not move them with direction from Nurse. Bridges remain patent and in place, cloudy yellow with moderate amount of sedemet noted, afebrile with no c/o pain noted.

## 2022-02-07 ENCOUNTER — SOCIAL WORK (OUTPATIENT)
Dept: NEUROLOGY | Facility: CLINIC | Age: 32
End: 2022-02-07
Payer: MEDICAID

## 2022-02-07 LAB
POCT GLUCOSE: 109 MG/DL (ref 70–110)
POCT GLUCOSE: 112 MG/DL (ref 70–110)

## 2022-02-07 PROCEDURE — 63600175 PHARM REV CODE 636 W HCPCS: Performed by: STUDENT IN AN ORGANIZED HEALTH CARE EDUCATION/TRAINING PROGRAM

## 2022-02-07 PROCEDURE — 25000003 PHARM REV CODE 250

## 2022-02-07 PROCEDURE — 99024 POSTOP FOLLOW-UP VISIT: CPT | Mod: ,,, | Performed by: PHYSICIAN ASSISTANT

## 2022-02-07 PROCEDURE — 36415 COLL VENOUS BLD VENIPUNCTURE: CPT | Performed by: PHYSICIAN ASSISTANT

## 2022-02-07 PROCEDURE — 85025 COMPLETE CBC W/AUTO DIFF WBC: CPT | Performed by: PHYSICIAN ASSISTANT

## 2022-02-07 PROCEDURE — 99024 PR POST-OP FOLLOW-UP VISIT: ICD-10-PCS | Mod: ,,, | Performed by: PHYSICIAN ASSISTANT

## 2022-02-07 PROCEDURE — 11000001 HC ACUTE MED/SURG PRIVATE ROOM

## 2022-02-07 PROCEDURE — 97112 NEUROMUSCULAR REEDUCATION: CPT | Mod: CQ

## 2022-02-07 PROCEDURE — 63600175 PHARM REV CODE 636 W HCPCS

## 2022-02-07 PROCEDURE — 25000003 PHARM REV CODE 250: Performed by: PHYSICIAN ASSISTANT

## 2022-02-07 PROCEDURE — 97530 THERAPEUTIC ACTIVITIES: CPT | Mod: CQ

## 2022-02-07 PROCEDURE — 97535 SELF CARE MNGMENT TRAINING: CPT

## 2022-02-07 PROCEDURE — 97530 THERAPEUTIC ACTIVITIES: CPT

## 2022-02-07 PROCEDURE — 25000003 PHARM REV CODE 250: Performed by: STUDENT IN AN ORGANIZED HEALTH CARE EDUCATION/TRAINING PROGRAM

## 2022-02-07 PROCEDURE — 80048 BASIC METABOLIC PNL TOTAL CA: CPT | Performed by: PHYSICIAN ASSISTANT

## 2022-02-07 RX ORDER — BACITRACIN ZINC 500 [USP'U]/G
OINTMENT TOPICAL 2 TIMES DAILY
Start: 2022-02-07

## 2022-02-07 RX ORDER — TAMSULOSIN HYDROCHLORIDE 0.4 MG/1
0.4 CAPSULE ORAL DAILY
Qty: 30 CAPSULE | Refills: 11
Start: 2022-02-07 | End: 2023-02-07

## 2022-02-07 RX ORDER — MODAFINIL 100 MG/1
100 TABLET ORAL EVERY MORNING
Qty: 30 TABLET | Refills: 0
Start: 2022-02-08 | End: 2022-03-10

## 2022-02-07 RX ORDER — HYDROCODONE BITARTRATE AND ACETAMINOPHEN 5; 325 MG/1; MG/1
1 TABLET ORAL EVERY 4 HOURS PRN
Refills: 0
Start: 2022-02-07

## 2022-02-07 RX ADMIN — MODAFINIL 100 MG: 100 TABLET ORAL at 09:02

## 2022-02-07 RX ADMIN — MINERAL OIL AND WHITE PETROLATUM: 30; 940 OINTMENT OPHTHALMIC at 08:02

## 2022-02-07 RX ADMIN — FAMOTIDINE 20 MG: 20 TABLET, FILM COATED ORAL at 09:02

## 2022-02-07 RX ADMIN — DOCUSATE SODIUM 50 MG AND SENNOSIDES 8.6 MG 1 TABLET: 8.6; 5 TABLET, FILM COATED ORAL at 09:02

## 2022-02-07 RX ADMIN — DEXAMETHASONE SODIUM PHOSPHATE 4 MG: 4 INJECTION INTRA-ARTICULAR; INTRALESIONAL; INTRAMUSCULAR; INTRAVENOUS; SOFT TISSUE at 05:02

## 2022-02-07 RX ADMIN — BACITRACIN: 500 OINTMENT TOPICAL at 09:02

## 2022-02-07 RX ADMIN — HEPARIN SODIUM 5000 UNITS: 5000 INJECTION INTRAVENOUS; SUBCUTANEOUS at 01:02

## 2022-02-07 RX ADMIN — DEXAMETHASONE SODIUM PHOSPHATE 4 MG: 4 INJECTION INTRA-ARTICULAR; INTRALESIONAL; INTRAMUSCULAR; INTRAVENOUS; SOFT TISSUE at 01:02

## 2022-02-07 RX ADMIN — LEVETIRACETAM 1000 MG: 500 SOLUTION ORAL at 09:02

## 2022-02-07 RX ADMIN — HEPARIN SODIUM 5000 UNITS: 5000 INJECTION INTRAVENOUS; SUBCUTANEOUS at 09:02

## 2022-02-07 RX ADMIN — TAMSULOSIN HYDROCHLORIDE 0.4 MG: 0.4 CAPSULE ORAL at 09:02

## 2022-02-07 RX ADMIN — TOPIRAMATE 200 MG: 200 TABLET, FILM COATED ORAL at 08:02

## 2022-02-07 RX ADMIN — MINERAL OIL AND WHITE PETROLATUM: 30; 940 OINTMENT OPHTHALMIC at 09:02

## 2022-02-07 RX ADMIN — LEVETIRACETAM 1000 MG: 500 SOLUTION ORAL at 08:02

## 2022-02-07 RX ADMIN — HEPARIN SODIUM 5000 UNITS: 5000 INJECTION INTRAVENOUS; SUBCUTANEOUS at 05:02

## 2022-02-07 RX ADMIN — TOPIRAMATE 200 MG: 200 TABLET, FILM COATED ORAL at 09:02

## 2022-02-07 RX ADMIN — DEXAMETHASONE SODIUM PHOSPHATE 4 MG: 4 INJECTION INTRA-ARTICULAR; INTRALESIONAL; INTRAMUSCULAR; INTRAVENOUS; SOFT TISSUE at 11:02

## 2022-02-07 RX ADMIN — BACITRACIN 1 EACH: 500 OINTMENT TOPICAL at 09:02

## 2022-02-07 RX ADMIN — POLYETHYLENE GLYCOL 3350 17 G: 17 POWDER, FOR SOLUTION ORAL at 09:02

## 2022-02-07 RX ADMIN — DEXAMETHASONE SODIUM PHOSPHATE 4 MG: 4 INJECTION INTRA-ARTICULAR; INTRALESIONAL; INTRAMUSCULAR; INTRAVENOUS; SOFT TISSUE at 06:02

## 2022-02-07 NOTE — PLAN OF CARE
Problem: Occupational Therapy Goal  Goal: Occupational Therapy Goal  Description: Goals to be met by: 2/16/2022    Patient will increase functional independence with ADLs by performing:    UE Dressing with Minimal Assistance.  Grooming while EOB with SBA.  Feedomg with Supervision at EOB.  Sitting at edge of bed 10 minutes with Supervision  Rolling to Left with Minimal Assistance. Goal met 2/07 updated to CGA  Supine to sit with Minimal Assistance. Goal met 2/07 updated to CGA  Transfers with Min (A)    Outcome: Ongoing, Progressing   Pts goals are appropriate.

## 2022-02-07 NOTE — PROGRESS NOTES
[facility] - Neurosurgery ([facility])  Neurosurgery  Progress Note    Subjective:     History of Present Illness: Pt is a 31F with intractable epilepsy who presents on 1/31 for elective left anterior temporal lobectomy for refractory epilepsy.     Dr. Iraheta's Clinic Note (1/20/22):  Carolyn Mccullough is a 31 y.o. female who presents as a referral from Dr. Stapleton to discuss definitive surgery for treatment of intractable epilepsy. She has undergone extensive workup at [facility], and Dr. Stapleton has shared all relevant records with us, including raw sEEG data and neuropsycholgical evaluation. We have also directly obtained the images, including post-sEEG CT head, to assess the electrode placement.      Based on these data, together with the additional imaging findings, and after extensive interdisciplinary discussion in conference and with Dr. Stapleton, I have offered the patient a choice between laser ablation of the amygdala and hippocampus or anterior temporal lobectomy. 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 There is a growing body of literature suggesting that encephaloceles are potentially epileptogenic; however, without actually having an electrode in the region, I cannot be sure if it is contributory in her case.      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 We discussed that this will carry an increased risk of neuropsychological risk, particularly with respect to speech. The patient and her mother expressed understanding. After deliberation, they prefer to proceed with KERLINE      Post-Op Info:  Procedure(s) (LRB):  LEFT CRANIOTOMY FOR TEMPORAL LOBECTOMY WITH BRAINLAB (Left)   7 Days  Post-Op     Interval History: NAEON. Neuro stable. Voiding spontaneously. Pending IPR. Denies headaches, new onset weakness, or seizures.     Medications:  Continuous Infusions:  Scheduled Meds:   bacitracin zinc   Topical (Top) BID    dexamethasone  4 mg Intravenous Q6H    famotidine  20 mg Oral BID    heparin (porcine)  5,000 Units Subcutaneous Q8H    levetiracetam  1,000 mg Oral BID    modafiniL  100 mg Oral QAM    polyethylene glycol  17 g Oral Daily    senna-docusate 8.6-50 mg  1 tablet Oral BID    tamsulosin  0.4 mg Oral Daily    topiramate  200 mg Oral BID    white petrolatum-mineral oiL   Right Eye BID     PRN Meds:acetaminophen, bisacodyL, dextrose 10%, dextrose 10%, glucagon (human recombinant), glucose, glucose, HYDROcodone-acetaminophen, insulin aspart U-100, ondansetron, ondansetron     Review of Systems  Objective:     Weight: 118.8 kg (262 lb)  Body mass index is 43.6 kg/m².  Vital Signs (Most Recent):  Temp: 99 °F (37.2 °C) (02/07/22 1129)  Pulse: 91 (02/07/22 1129)  Resp: 18 (02/07/22 1129)  BP: 106/64 (02/07/22 1129)  SpO2: 100 % (02/07/22 1129) Vital Signs (24h Range):  Temp:  [96.8 °F (36 °C)-99 °F (37.2 °C)] 99 °F (37.2 °C)  Pulse:  [64-91] 91  Resp:  [16-18] 18  SpO2:  [95 %-100 %] 100 %  BP: (106-115)/(61-74) 106/64                     Female External Urinary Catheter 02/06/22 1100 (Active)   Skin no redness;no breakdown 02/07/22 0800   Tolerance no signs/symptoms of discomfort 02/07/22 0800   Suction Continuous suction at 70 mmHg 02/06/22 2000   Date of last wick change 02/06/22 02/06/22 2000   Time of last wick change 2000 02/06/22 2000   Output (mL) 450 mL 02/06/22 2000       Physical Exam    Neurosurgery Physical Exam  General: well developed, well nourished, no distress.   Head: normocephalic,   Neurologic: Alert  Mental Status: Awake, Alert, Oriented x 2 (person, place, not year)  Language: expressive aphasia  Speech: dysarthric  Cranial nerves: right facial droop  Eyes:   EOMI.   Pulmonary: normal respirations, no signs of respiratory distress  Sensory: intact to light touch throughout  Motor Strength:No abnormal movements seen. Left side full strength. Plegic right upper, 1/5 KF on RLE otherwise plegic in right lower  Vascular: No LE edema.   Skin: Skin is warm, dry and intact.  Incision: c/d/i with skin edges well approximated with staples. No surrounding erythema or edema. No drainage from incision. No palpable hematoma or underlying fluid collection.     Significant Labs:  Recent Labs   Lab 02/05/22  2352   *      K 3.4*   *   CO2 16*   BUN 14   CREATININE 0.7   CALCIUM 8.8     Recent Labs   Lab 02/05/22  2352   WBC 10.66   HGB 11.4*   HCT 36.2*        No results for input(s): LABPT, INR, APTT in the last 48 hours.  Microbiology Results (last 7 days)     ** No results found for the last 168 hours. **        All pertinent labs from the last 24 hours have been reviewed.    Significant Diagnostics:  No results found in the last 24 hours.      Assessment/Plan:     * Localization-related (focal) (partial) symptomatic epilepsy and epileptic syndromes with complex partial seizures, intractable, without status epilepticus  Pt is 31F with temporal epilepsy who is now s/p left craniotomy for anterior temporal lobectomy 2/1:    --Admitted to floor care   -Q4h neurochecks   -- All diagnostics reviewed   -CTH post op satisfactory   -MRI brain 2/1 with expected changes as well as acute left basal ganglia infarct   -CTA 2/1 redemonstrated left basal ganglia infarct without hemorrhagic conversion  --Continue dex 4q6, GI PPx while on steroids  --Continue home AEDs keppra and topiramate  --Aggressive PT/OT/SLP   --DVT ppx: TEDs/SCDs/SQH  --continue modafinal   --HTN: PRN labetalol, hydralazine. SBP <160.   --Bowel regimen: Daily Miralax.   --Urinary: Smith replaced 2/3. Flomax started. Smith removed Sunday morning, continues to void spontaneously s/p smith  removal    Dispo: PT/OT recs for rehab. Pending placement.        Meghan Sosa PA-C  Neurosurgery  [facility] - Neurosurgery ([facility])

## 2022-02-07 NOTE — PROGRESS NOTES
"Epilepsy  met patient in her room in the neuro unit.   Patient initially asleep but woke up and greeted SW.   Patient presented with bright mood and upbeat demeanor.   Patient accepting of SW.     Patient accompanied by family member.     Patient's family member encouraged patient to speak.   Patient still experiencing symptoms of aphasia but patient able to communicate with SW.     SW asked patient how she had been over the weekend and patient verbalized "I've been alright."     Patient also verbalized she was not in pain.     SW reported he would continue to visit as needed.       Joel Rogers [facility]  Clinical  -ALS, Epilepsy, Headache  [facility] - [facility].  "

## 2022-02-07 NOTE — SUBJECTIVE & OBJECTIVE
Interval History: NAEON. Neuro stable. Voiding spontaneously. Pending IPR. Denies headaches, new onset weakness, or seizures.     Medications:  Continuous Infusions:  Scheduled Meds:   bacitracin zinc   Topical (Top) BID    dexamethasone  4 mg Intravenous Q6H    famotidine  20 mg Oral BID    heparin (porcine)  5,000 Units Subcutaneous Q8H    levetiracetam  1,000 mg Oral BID    modafiniL  100 mg Oral QAM    polyethylene glycol  17 g Oral Daily    senna-docusate 8.6-50 mg  1 tablet Oral BID    tamsulosin  0.4 mg Oral Daily    topiramate  200 mg Oral BID    white petrolatum-mineral oiL   Right Eye BID     PRN Meds:acetaminophen, bisacodyL, dextrose 10%, dextrose 10%, glucagon (human recombinant), glucose, glucose, HYDROcodone-acetaminophen, insulin aspart U-100, ondansetron, ondansetron     Review of Systems  Objective:     Weight: 118.8 kg (262 lb)  Body mass index is 43.6 kg/m².  Vital Signs (Most Recent):  Temp: 99 °F (37.2 °C) (02/07/22 1129)  Pulse: 91 (02/07/22 1129)  Resp: 18 (02/07/22 1129)  BP: 106/64 (02/07/22 1129)  SpO2: 100 % (02/07/22 1129) Vital Signs (24h Range):  Temp:  [96.8 °F (36 °C)-99 °F (37.2 °C)] 99 °F (37.2 °C)  Pulse:  [64-91] 91  Resp:  [16-18] 18  SpO2:  [95 %-100 %] 100 %  BP: (106-115)/(61-74) 106/64                     Female External Urinary Catheter 02/06/22 1100 (Active)   Skin no redness;no breakdown 02/07/22 0800   Tolerance no signs/symptoms of discomfort 02/07/22 0800   Suction Continuous suction at 70 mmHg 02/06/22 2000   Date of last wick change 02/06/22 02/06/22 2000   Time of last wick change 2000 02/06/22 2000   Output (mL) 450 mL 02/06/22 2000       Physical Exam    Neurosurgery Physical Exam  General: well developed, well nourished, no distress.   Head: normocephalic,   Neurologic: Alert  Mental Status: Awake, Alert, Oriented x 2 (person, place, not year)  Language: expressive aphasia  Speech: dysarthric  Cranial nerves: right facial droop  Eyes:  EOMI.    Pulmonary: normal respirations, no signs of respiratory distress  Sensory: intact to light touch throughout  Motor Strength:No abnormal movements seen. Left side full strength. Plegic right upper, 1/5 KF on RLE otherwise plegic in right lower  Vascular: No LE edema.   Skin: Skin is warm, dry and intact.  Incision: c/d/i with skin edges well approximated with staples. No surrounding erythema or edema. No drainage from incision. No palpable hematoma or underlying fluid collection.     Significant Labs:  Recent Labs   Lab 02/05/22  2352   *      K 3.4*   *   CO2 16*   BUN 14   CREATININE 0.7   CALCIUM 8.8     Recent Labs   Lab 02/05/22  2352   WBC 10.66   HGB 11.4*   HCT 36.2*        No results for input(s): LABPT, INR, APTT in the last 48 hours.  Microbiology Results (last 7 days)     ** No results found for the last 168 hours. **        All pertinent labs from the last 24 hours have been reviewed.    Significant Diagnostics:  No results found in the last 24 hours.

## 2022-02-07 NOTE — PLAN OF CARE
[facility]    FACILITY TRANSFER ORDERS      Patient Name: Carolyn Mccullough  YOB: 1990    PCP: Fadumo Laughlin MD   PCP Address: [address] [place] [address]  PCP Phone Number: [phone]  PCP Fax: [phone]    Encounter Date: 02/07/2022    Admit to: IPR    Vital Signs:  Routine    Diagnoses:   Active Hospital Problems    Diagnosis  POA    *Localization-related (focal) (partial) symptomatic epilepsy and epileptic syndromes with complex partial seizures, intractable, without status epilepticus [G40.219]  Yes    Acute cerebrovascular accident (CVA) of basal ganglia [I63.9]  No    History of prediabetes [Z87.898]  Yes    Essential hypertension [I10]  Yes    Morbid obesity [E66.01]  Yes    DRESS syndrome [D72.12, T50.905A]  Yes      Resolved Hospital Problems    Diagnosis Date Resolved POA    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 DVT prophylaxis, and bowel regimen per facility protocol    CONSULTS:    Physical Therapy to evaluate and treat. , Occupational Therapy to evaluate and treat. and Speech Therapy to evaluate and treat for Language, Swallowing and Cognition.    WOUND CARE ORDERS  Yes: Surgical Wound:  Location: left cranial. Apply bacitracin twice daily for one more week. Okay to remove staples on 2/14      Medications: Review discharge medications with patient and family and provide education.      Current Discharge Medication List      START taking these medications    Details   bacitracin zinc 500 unit/gram OiPk Apply topically 2 (two) times daily.      HYDROcodone-acetaminophen (NORCO) 5-325 mg per tablet Take 1 tablet by mouth every 4 (four) hours as needed for Pain.  Refills: 0     Comments: Quantity prescribed more than 7 day supply? Yes, quantity medically necessary      modafiniL (PROVIGIL) 100 MG Tab Take 1 tablet (100 mg total) by mouth every morning.  Qty: 30 tablet, Refills: 0      tamsulosin (FLOMAX) 0.4 mg Cap Take 1 capsule (0.4 mg total) by mouth once daily.  Qty: 30 capsule, Refills: 11      white petrolatum-mineral oiL 94-3 % Oint Place into the right eye 2 (two) times a day.  Refills: 0         CONTINUE these medications which have NOT CHANGED    Details   levETIRAcetam (KEPPRA) 1000 MG tablet Take 1,000 mg by mouth 2 (two) times daily.      topiramate (TOPAMAX) 200 MG Tab Take 200 mg by mouth 2 (two) times daily.      acetaminophen (TYLENOL) 500 MG tablet Take 500 mg by mouth daily as needed for Pain.      midazolam (NAYZILAM) 5 mg/spray (0.1 mL) Spry 1 spray by Nasal route every 10 (ten) minutes as needed (cluster seizures). Please administer 1 spray after seizures lasting more than 3 minutes or more that 3 seizures in a 24 hour period, may administer a second dose after 10 min if there is no response to the first dose. Use for treatment of no more than 1 episode (1-2 sprays) every three days, no more than 5 episodes (1-2 sprays) in a month  Qty: 4 each, Refills: 3    Comments: Please dispense 2 boxes  Associated Diagnoses: Refractory epilepsy         STOP taking these medications       aspirin/salicylamide/caffeine (BC HEADACHE POWDER ORAL) Comments:   Reason for Stopping:         ibuprofen (ADVIL,MOTRIN) 800 MG tablet Comments:   Reason for Stopping:                  Immunizations Administered as of 2/7/2022     No immunizations on file.          This patient has had both covid vaccinations    Some patients may experience side effects after vaccination.  These may include fever, headache, muscle or joint aches.  Most symptoms resolve with 24-48 hours and do not require urgent medical evaluation unless they persist for more than 72 hours or symptoms are concerning for an  unrelated medical condition.          _________________________________  Meghan Sosa PA-C  02/07/2022

## 2022-02-07 NOTE — PLAN OF CARE
Problem: Adult Inpatient Plan of Care  Goal: Plan of Care Review  Outcome: Ongoing, Progressing  Goal: Optimal Comfort and Wellbeing  Outcome: Ongoing, Progressing     Problem: Adult Inpatient Plan of Care  Goal: Optimal Comfort and Wellbeing  Outcome: Ongoing, Progressing     Problem: Fall Injury Risk  Goal: Absence of Fall and Fall-Related Injury  Outcome: Ongoing, Progressing     Problem: Skin Injury Risk Increased  Goal: Skin Health and Integrity  Outcome: Ongoing, Progressing     Problem: Adjustment to Illness (Stroke, Ischemic/Transient Ischemic Attack)  Goal: Optimal Coping  Outcome: Ongoing, Progressing     Problem: Communication Impairment (Stroke, Ischemic/Transient Ischemic Attack)  Goal: Improved Communication Skills  Outcome: Ongoing, Progressing   Patient is awake and alert, continues with the issue of word finding. Does better when you ask her basic yes/no questions. Family at bedside, attentive to patient. Incision to left head is well approximated, staples are DCI. Left eye lid remains drooping. Has slight facial droop noted. Took meds whole w/o difficulty today, repositioned for comfort/pressure relief. Right arm remains flacid with plus 2-3 edema, remains elevated. Has some spastic movement at times but cannot move arm or leg on command. VSS, assessments completed. Continue with current POC.

## 2022-02-07 NOTE — PLAN OF CARE
[facility] - Neurosurgery (Hospital)  Discharge Reassessment    Primary Care Provider: Fadumo Laughlin MD    Expected Discharge Date: 2/7/2022    Patient is medically ready for discharge.  Patient is pending insurance auth for inpatient rehab.      Reassessment (most recent)     Discharge Reassessment - 02/07/22 1041        Discharge Reassessment    Assessment Type Discharge Planning Reassessment     Did the patient's condition or plan change since previous assessment? No     Discharge Plan discussed with: Patient;Parent(s)     Communicated LADI with patient/caregiver Yes     Discharge Plan A Rehab     Discharge Plan B Home with family;Home Health     DME Needed Upon Discharge  none     Discharge Barriers Identified None     Why the patient remains in the hospital Insurance issues        Post-Acute Status    Post-Acute Authorization Placement     Post-Acute Placement Status Pending payor review/awaiting authorization (if required)     Discharge Delays None known at this time

## 2022-02-07 NOTE — PT/OT/SLP PROGRESS
"Physical Therapy Treatment    Patient Name:  Carolyn Mccullough   MRN:  [id number]    Recommendations:     Discharge Recommendations:  rehabilitation facility   Discharge Equipment Recommendations:  (TBD)   Barriers to discharge: impaired functional mobility requiring increased assistance    Assessment:     Carolyn Mccullough is a 31 y.o. female admitted with a medical diagnosis of Localization-related (focal) (partial) symptomatic epilepsy and epileptic syndromes with complex partial seizures, intractable, without status epilepticus.  She presents with the following impairments/functional limitations:  weakness,impaired endurance,impaired self care skills,impaired functional mobilty,impaired balance,gait instability,impaired cognition,decreased coordination,decreased upper extremity function,decreased lower extremity function,decreased safety awareness,abnormal tone,impaired coordination,impaired fine motor. Pt tolerated session well with focus on bed mobility, transfers, and standing balance/endurance. Pt progressing fairly well increasing standing endurance but pt remains limited by R sided hemiparesis. Pt RUE and RLE remain flaccid with no noted spontaneous or purposeful contractions throughout session. Pt will continue to benefit from therapy services to address impairments listed above.     Rehab Prognosis: Good; patient would benefit from acute skilled PT services to address these deficits and reach maximum level of function.    Recent Surgery: Procedure(s) (LRB):  LEFT CRANIOTOMY FOR TEMPORAL LOBECTOMY WITH BRAINLAB (Left) 7 Days Post-Op    Plan:     During this hospitalization, patient to be seen 4 x/week to address the identified rehab impairments via gait training,therapeutic activities,therapeutic exercises,neuromuscular re-education and progress toward the following goals:    · Plan of Care Expires:  03/04/22    Subjective     Chief Complaint: no c/o  Patient/Family Comments/goals: "I'm doing fine." "   Pain/Comfort:  · Pain Rating 1: 0/10  · Pain Rating Post-Intervention 1: 0/10      Objective:     Communicated with RN prior to session.  Patient found HOB elevated with telemetry,smith catheter upon PTA entry to room. Aunt at bedside.     General Precautions: Standard, fall,aphasia   Orthopedic Precautions:N/A   Braces: N/A  Respiratory Status: Room air     Functional Mobility:  · Bed Mobility:     · Rolling Left:  maximal assistance  · Scooting: maximal assistance  · Supine to Sit: maximal assistance  · Sit to Supine: moderate assistance  · Transfers:     · Sit to Stand:  moderate assistance with hand-held assist  · Gait: Pt takes 2 side steps to L along EOB with pt requiring tactile cues to initiate LLE step. Therapist manages RLE progression d/t hemiparesis.       AM-PAC 6 CLICK MOBILITY  Turning over in bed (including adjusting bedclothes, sheets and blankets)?: 2  Sitting down on and standing up from a chair with arms (e.g., wheelchair, bedside commode, etc.): 2  Moving from lying on back to sitting on the side of the bed?: 2  Moving to and from a bed to a chair (including a wheelchair)?: 2  Need to walk in hospital room?: 1  Climbing 3-5 steps with a railing?: 1  Basic Mobility Total Score: 10       Therapeutic Activities and Exercises:   Pt assisted with functional mobility as noted above.   RUE/RLE PROM, attempted AAROM with R hemiparesis noted.   Pt performs sit<>stand x 3 trials with therapist managing RLE blocking and supporting RUE. Stands for 2-3 minutes each trial. Weight shifting in stance.   Perturbation in sitting with pt able to maintain static balance, but with increased force pt unable to remain balanced once in dynamic ranges with sudden LOB. No protective response with BUEs. Lateral instability greater than frontal.     Patient left HOB elevated with all lines intact, call button in reach and aunt present.    GOALS:   Multidisciplinary Problems     Physical Therapy Goals        Problem:  Physical Therapy Goal    Goal Priority Disciplines Outcome Goal Variances Interventions   Physical Therapy Goal     PT, PT/OT Ongoing, Progressing     Description: Goals to be met by: 2022     Patient will increase functional independence with mobility by performin. Supine to sit with MInimal Assistance  2. Sit to supine with MInimal Assistance  3. Sit to stand transfer with Minimal Assistance  4. Bed to chair transfer with Minimal Assistance using LRAD  5. Gait  x 5 feet with Minimal Assistance using LRAD.   6. Sitting at edge of bed x10 minutes with Contact Guard Assistance  7. Lower extremity exercise program x15 reps per handout, with assistance as needed                     Time Tracking:     PT Received On: 22  PT Start Time: 0940     PT Stop Time: 1019  PT Total Time (min): 39 min     Billable Minutes: Therapeutic Activity 29 and Neuromuscular Re-education 10    Treatment Type: Treatment  PT/PTA: PTA     PTA Visit Number: 3     2022

## 2022-02-07 NOTE — ASSESSMENT & PLAN NOTE
Pt is 31F with temporal epilepsy who is now s/p left craniotomy for anterior temporal lobectomy 2/1:    --Admitted to floor care   -Q4h neurochecks   -- All diagnostics reviewed   -CTH post op satisfactory   -MRI brain 2/1 with expected changes as well as acute left basal ganglia infarct   -CTA 2/1 redemonstrated left basal ganglia infarct without hemorrhagic conversion  --Continue dex 4q6, GI PPx while on steroids  --Continue home AEDs keppra and topiramate  --Aggressive PT/OT/SLP   --DVT ppx: TEDs/SCDs/SQH  --continue modafinal   --HTN: PRN labetalol, hydralazine. SBP <160.   --Bowel regimen: Daily Miralax.   --Urinary: Smith replaced 2/3. Flomax started. Smith removed Sunday morning, continues to void spontaneously s/p smith removal    Dispo: PT/OT recs for rehab. Pending placement.

## 2022-02-07 NOTE — PT/OT/SLP PROGRESS
Occupational Therapy   Treatment    Name: Carolyn Mccullough  MRN: [id number]  Admitting Diagnosis:  Localization-related (focal) (partial) symptomatic epilepsy and epileptic syndromes with complex partial seizures, intractable, without status epilepticus  7 Days Post-Op    Recommendations:     Discharge Recommendations: rehabilitation facility  Discharge Equipment Recommendations:  none  Barriers to discharge:  Other (Comment) (increased assistance required)    Assessment:     Carolyn Mccullough is a 31 y.o. female with a medical diagnosis of Localization-related (focal) (partial) symptomatic epilepsy and epileptic syndromes with complex partial seizures, intractable, without status epilepticus.  She presents with cooperative and pleasant demeanor and willingness to participate in therapy. Performance deficits affecting function are weakness,impaired endurance,impaired self care skills,impaired functional mobilty,impaired balance,gait instability,impaired cognition,decreased coordination,decreased upper extremity function,decreased lower extremity function,decreased safety awareness,abnormal tone,impaired coordination,decreased ROM,impaired fine motor. Patient would benefit from continued skilled acute OT 4x/wk to improve functional mobility, increase independence with ADLs, and address established goals. Recommending rehab once medically appropriate for discharge to increase maximal independence, reduce burden of care, and ensure safety.     Rehab Prognosis:  Good; patient would benefit from acute skilled OT services to address these deficits and reach maximum level of function.       Plan:     Patient to be seen 4 x/week to address the above listed problems via self-care/home management,therapeutic activities,neuromuscular re-education,therapeutic exercises,cognitive retraining,sensory integration  · Plan of Care Expires: 03/02/22  · Plan of Care Reviewed with: patient,family    Subjective   I'm feeling  good.  Pain/Comfort:  · Pain Rating 1: 0/10    Objective:     Communicated with: nurse prior to session.  Patient found HOB elevated with telemetry,smith catheter upon OT entry to room.    General Precautions: Standard, fall,aphasia   Orthopedic Precautions:N/A   Braces: N/A  Respiratory Status: Room air     Occupational Performance:     Bed Mobility:    · Patient completed Rolling/Turning to Left with  minimum assistance  · Patient completed Scooting/Bridging with contact guard assistance  · Patient completed Supine to Sit with minimum assistance  · Patient completed Sit to Supine with minimum assistance     Functional Mobility/Transfers:  · Patient completed Sit <> Stand Transfer with moderate assistance  with  hand-held assist with support needed for bracing right leg and support of RUE.   · Functional Mobility: Pt did not perform functional mobility at this treatment session.  · Pt performed 15 minutes of unsupported static and dynamic sitting regaining and maintaining midline with SBA after challenged bilaterally, anteriorly and posteriorly 10 reps each direction.       Activities of Daily Living:  Pt performed grooming with SBA after set up on tray table to brush teeth, wash face and neck and use lip balm. Pt needed assist washing areas she could not reach (back of neck and underarms).    [facility] 6 Click ADL: 12    Treatment & Education:  Role of OT and POC  Safety  ADL retraining  Functional mobility training  Discharge planning  Importance of EOB/OOB activity  Pt received PROM to BUE's with no pain noted in any plane. Slight muscle movement felt in right hand when asked to squeeze.  Pt able to tolerate sitting EOB for 15 minutes for TA activities.    Patient left HOB elevated with all lines intact, call button in reach, nurse notified and aunt presentEducation:      GOALS:   Multidisciplinary Problems     Occupational Therapy Goals        Problem: Occupational Therapy Goal    Goal Priority Disciplines Outcome  Interventions   Occupational Therapy Goal     OT, PT/OT Ongoing, Progressing    Description: Goals to be met by: 2/16/2022    Patient will increase functional independence with ADLs by performing:    UE Dressing with Minimal Assistance.  Grooming while EOB with SBA.  Feedomg with Supervision at EOB.  Sitting at edge of bed 10 minutes with Supervision  Rolling to Left with Minimal Assistance. Goal updated to CGA  Supine to sit with Minimal Assistance. Goal updated to CGA  Transfers with Min (A)                     Time Tracking:     OT Date of Treatment: 02/07/22  OT Start Time: 1345  OT Stop Time: 1414  OT Total Time (min): 29 min    Billable Minutes:Self Care/Home Management 15  Therapeutic Activity 14    OT/MAXWELL: OT          2/7/2022

## 2022-02-08 VITALS
WEIGHT: 262 LBS | RESPIRATION RATE: 18 BRPM | HEIGHT: 65 IN | HEART RATE: 65 BPM | SYSTOLIC BLOOD PRESSURE: 112 MMHG | BODY MASS INDEX: 43.65 KG/M2 | DIASTOLIC BLOOD PRESSURE: 68 MMHG | TEMPERATURE: 98 F | OXYGEN SATURATION: 99 %

## 2022-02-08 LAB
ANION GAP SERPL CALC-SCNC: 8 MMOL/L (ref 8–16)
BASOPHILS # BLD AUTO: 0.03 K/UL (ref 0–0.2)
BASOPHILS NFR BLD: 0.2 % (ref 0–1.9)
BUN SERPL-MCNC: 18 MG/DL (ref 6–20)
CALCIUM SERPL-MCNC: 8.9 MG/DL (ref 8.7–10.5)
CHLORIDE SERPL-SCNC: 107 MMOL/L (ref 95–110)
CO2 SERPL-SCNC: 20 MMOL/L (ref 23–29)
CREAT SERPL-MCNC: 0.8 MG/DL (ref 0.5–1.4)
DIFFERENTIAL METHOD: ABNORMAL
EOSINOPHIL # BLD AUTO: 0 K/UL (ref 0–0.5)
EOSINOPHIL NFR BLD: 0 % (ref 0–8)
ERYTHROCYTE [DISTWIDTH] IN BLOOD BY AUTOMATED COUNT: 15.1 % (ref 11.5–14.5)
EST. GFR  (AFRICAN AMERICAN): >60 ML/MIN/1.73 M^2
EST. GFR  (NON AFRICAN AMERICAN): >60 ML/MIN/1.73 M^2
GLUCOSE SERPL-MCNC: 133 MG/DL (ref 70–110)
HCT VFR BLD AUTO: 37.2 % (ref 37–48.5)
HGB BLD-MCNC: 12.1 G/DL (ref 12–16)
IMM GRANULOCYTES # BLD AUTO: 0.13 K/UL (ref 0–0.04)
IMM GRANULOCYTES NFR BLD AUTO: 0.9 % (ref 0–0.5)
LYMPHOCYTES # BLD AUTO: 2.5 K/UL (ref 1–4.8)
LYMPHOCYTES NFR BLD: 17.7 % (ref 18–48)
MCH RBC QN AUTO: 27.6 PG (ref 27–31)
MCHC RBC AUTO-ENTMCNC: 32.5 G/DL (ref 32–36)
MCV RBC AUTO: 85 FL (ref 82–98)
MONOCYTES # BLD AUTO: 0.8 K/UL (ref 0.3–1)
MONOCYTES NFR BLD: 5.8 % (ref 4–15)
NEUTROPHILS # BLD AUTO: 10.6 K/UL (ref 1.8–7.7)
NEUTROPHILS NFR BLD: 75.4 % (ref 38–73)
NRBC BLD-RTO: 0 /100 WBC
PLATELET # BLD AUTO: 204 K/UL (ref 150–450)
PMV BLD AUTO: 10.8 FL (ref 9.2–12.9)
POCT GLUCOSE: 112 MG/DL (ref 70–110)
POCT GLUCOSE: 128 MG/DL (ref 70–110)
POTASSIUM SERPL-SCNC: 3.8 MMOL/L (ref 3.5–5.1)
RBC # BLD AUTO: 4.39 M/UL (ref 4–5.4)
SODIUM SERPL-SCNC: 135 MMOL/L (ref 136–145)
WBC # BLD AUTO: 14.09 K/UL (ref 3.9–12.7)

## 2022-02-08 PROCEDURE — 63600175 PHARM REV CODE 636 W HCPCS: Performed by: STUDENT IN AN ORGANIZED HEALTH CARE EDUCATION/TRAINING PROGRAM

## 2022-02-08 PROCEDURE — 92507 TX SP LANG VOICE COMM INDIV: CPT

## 2022-02-08 PROCEDURE — 25000003 PHARM REV CODE 250: Performed by: PHYSICIAN ASSISTANT

## 2022-02-08 PROCEDURE — 63600175 PHARM REV CODE 636 W HCPCS

## 2022-02-08 PROCEDURE — 99024 POSTOP FOLLOW-UP VISIT: CPT | Mod: ,,, | Performed by: PHYSICIAN ASSISTANT

## 2022-02-08 PROCEDURE — 25000003 PHARM REV CODE 250

## 2022-02-08 PROCEDURE — 99024 PR POST-OP FOLLOW-UP VISIT: ICD-10-PCS | Mod: ,,, | Performed by: PHYSICIAN ASSISTANT

## 2022-02-08 PROCEDURE — 97535 SELF CARE MNGMENT TRAINING: CPT

## 2022-02-08 PROCEDURE — 25000003 PHARM REV CODE 250: Performed by: STUDENT IN AN ORGANIZED HEALTH CARE EDUCATION/TRAINING PROGRAM

## 2022-02-08 RX ORDER — DEXAMETHASONE 2 MG/1
TABLET ORAL
Qty: 47 TABLET | Refills: 0
Start: 2022-02-09 | End: 2022-02-23

## 2022-02-08 RX ORDER — FAMOTIDINE 20 MG/1
20 TABLET, FILM COATED ORAL 2 TIMES DAILY
Qty: 60 TABLET | Refills: 11
Start: 2022-02-08 | End: 2023-02-08

## 2022-02-08 RX ADMIN — POLYETHYLENE GLYCOL 3350 17 G: 17 POWDER, FOR SOLUTION ORAL at 09:02

## 2022-02-08 RX ADMIN — FAMOTIDINE 20 MG: 20 TABLET, FILM COATED ORAL at 09:02

## 2022-02-08 RX ADMIN — DEXAMETHASONE SODIUM PHOSPHATE 4 MG: 4 INJECTION INTRA-ARTICULAR; INTRALESIONAL; INTRAMUSCULAR; INTRAVENOUS; SOFT TISSUE at 06:02

## 2022-02-08 RX ADMIN — DOCUSATE SODIUM 50 MG AND SENNOSIDES 8.6 MG 1 TABLET: 8.6; 5 TABLET, FILM COATED ORAL at 09:02

## 2022-02-08 RX ADMIN — HEPARIN SODIUM 5000 UNITS: 5000 INJECTION INTRAVENOUS; SUBCUTANEOUS at 06:02

## 2022-02-08 RX ADMIN — LEVETIRACETAM 1000 MG: 500 SOLUTION ORAL at 09:02

## 2022-02-08 RX ADMIN — BACITRACIN 1 EACH: 500 OINTMENT TOPICAL at 09:02

## 2022-02-08 RX ADMIN — TOPIRAMATE 200 MG: 200 TABLET, FILM COATED ORAL at 09:02

## 2022-02-08 RX ADMIN — TAMSULOSIN HYDROCHLORIDE 0.4 MG: 0.4 CAPSULE ORAL at 09:02

## 2022-02-08 RX ADMIN — MODAFINIL 100 MG: 100 TABLET ORAL at 06:02

## 2022-02-08 NOTE — ASSESSMENT & PLAN NOTE
Pt is 31F with temporal epilepsy who is now s/p left craniotomy for anterior temporal lobectomy 2/1:    --Admitted to floor care   -Q4h neurochecks   -- All diagnostics reviewed   -CTH post op satisfactory   -MRI brain 2/1 with expected changes as well as acute left basal ganglia infarct   -CTA 2/1 redemonstrated left basal ganglia infarct without hemorrhagic conversion  --Dex taper to off over 2 weeks on discharge. Continue PPI while on steroids   --Continue home AEDs keppra and topiramate  --Aggressive PT/OT/SLP   --DVT ppx: TEDs/SCDs/SQH  --continue modafinal   --HTN: PRN labetalol, hydralazine. SBP <160.   --Bowel regimen: Daily Miralax.   --Urinary: Smith replaced 2/3. Flomax started. Smith removed Sunday morning, continues to void spontaneously s/p smith removal    Dispo: PT/OT recs for rehab. Pending placement, plan for discharge today. Discharge instructions discussed with patient and family

## 2022-02-08 NOTE — DISCHARGE INSTRUCTIONS
Neurosurgery Patient Information      -For epilepsy patients: No driving until cleared by your epilepsy neurologist.   -Do not take any OTC products containing acetaminophen at the same time as you take your narcotic pain medication. Medications that may contain acetaminophen include but are not limited to: Excedrin and other headache medications, arthritis medications, cold and sinus medications, etc. Please review the list of active ingredients in any OTC medication prior to taking it.  -Do not take any Aspirin or Aspirin-containing products for 2 weeks after surgery.  -Do not take any Aleve, Naprosyn, Naproxen, Ibuprofen, Advil or any other nonsteroidal anti-inflammatory drug (NSAID) for 2 weeks after surgery.  -Do not take any herbal supplements for 2 weeks after surgery.   -Do not consume any alcoholic beverages until released by your neurosurgeon  -Do not perform any excessive bending over or leaning forward as this is a fall hazard.  -Do not lift anything heavier than a gallon of milk until cleared in post-operative visit.     Contact the Neurosurgery Office immediately if:  If you begin to notice any neurologic changes such as:           -Sudden onset of lethargy or sleepiness           -Sudden confusion, trouble speaking, or understanding            -Sudden trouble seeing in one or both eyes            -Sudden trouble walking, dizziness, loss of coordination            -Sudden severe headache with no known cause            -Sudden onset of numbness or weakness       Wound Care:  Keep your incision open to air. You may shower on the 2nd day after your surgery. Please shower with baby shampoo, but do not take a bath. Keep the incision clean and dry at all times. Please cover the incision with saran wrap or other occlusive dressing while showering and REMOVE once you have completed taking your shower. Do not allow the force of water to hit the incision. If the incision gets damp, gently pat it dry. Do not rub  or scrub the incision. You cannot take a bath/swim/submerge the incision until 8 weeks after surgery.    The incision does not need to be cleaned with any water, soap, alcohol, peroxide, or other substance        Miscellaneous:  -Follow up with Dr. Iraheta  in 2 weeks for a wound check. Appointment will be mailed to you.      Neurosurgery Office: [phone]

## 2022-02-08 NOTE — CARE UPDATE
"RAPID RESPONSE NURSE CHART REVIEW        Chart Reviewed: 02/07/2022, 8:40 PM    MRN: [id number]  Bed: 905/905 A    Dx: Localization-related (focal) (partial) symptomatic epilepsy and epileptic syndromes with complex partial seizures, intractable, without status epilepticus    Carolyn Mccullough has a past medical history of Seizures.    Last VS: /64   Pulse (!) 18   Temp 96.1 °F (35.6 °C) (Oral)   Resp 18   Ht 5' 5" (1.651 m)   Wt 118.8 kg (262 lb)   LMP 01/24/2022 (Exact Date)   SpO2 100%   Breastfeeding No   BMI 43.60 kg/m²     24H Vital Sign Range:  Temp:  [96.1 °F (35.6 °C)-99 °F (37.2 °C)]   Pulse:  [18-91]   Resp:  [16-18]   BP: (106-115)/(64-74)   SpO2:  [95 %-100 %]     Level of Consciousness (AVPU): alert    Recent Labs     02/05/22  0457 02/05/22  2352   WBC 8.23 10.66   HGB 10.8* 11.4*   HCT 33.8* 36.2*    252       Recent Labs     02/05/22  0457 02/05/22  2352   * 136   K 3.8 3.4*    112*   CO2 19* 16*   CREATININE 0.7 0.7   * 133*   PHOS 3.1  --    MG 1.8  --         No results for input(s): PH, PCO2, PO2, HCO3, POCSATURATED, BE in the last 72 hours.     OXYGEN:  Flow (L/min): 6     O2 Device (Oxygen Therapy): room air    MEWS score: 4    bedside Martha ZIEGLER contacted. No concerns verbalized at this time. Instructed to call [phone] for further concerns or assistance.    Brittney Mars RN      "

## 2022-02-08 NOTE — PROGRESS NOTES
[facility] - Neurosurgery ([facility])  Neurosurgery  Progress Note    Subjective:     History of Present Illness: Pt is a 31F with intractable epilepsy who presents on 1/31 for elective left anterior temporal lobectomy for refractory epilepsy.     Dr. Iraheta's Clinic Note (1/20/22):  Carolyn Mccullough is a 31 y.o. female who presents as a referral from Dr. Stapleton to discuss definitive surgery for treatment of intractable epilepsy. She has undergone extensive workup at [facility], and Dr. Stapleton has shared all relevant records with us, including raw sEEG data and neuropsycholgical evaluation. We have also directly obtained the images, including post-sEEG CT head, to assess the electrode placement.      Based on these data, together with the additional imaging findings, and after extensive interdisciplinary discussion in conference and with Dr. Stapleton, I have offered the patient a choice between laser ablation of the amygdala and hippocampus or anterior temporal lobectomy. 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 There is a growing body of literature suggesting that encephaloceles are potentially epileptogenic; however, without actually having an electrode in the region, I cannot be sure if it is contributory in her case.      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 We discussed that this will carry an increased risk of neuropsychological risk, particularly with respect to speech. The patient and her mother expressed understanding. After deliberation, they prefer to proceed with KERLINE      Post-Op Info:  Procedure(s) (LRB):  LEFT CRANIOTOMY FOR TEMPORAL LOBECTOMY WITH BRAINLAB (Left)   8 Days  Post-Op     Interval History: NAEON. Right lower extremity paresis improving. No headaches or seizure activity. Pending IPR, plan for transfer today. Dc instructions discussed with patient's aunt who voiced understanding.     Medications:  Continuous Infusions:  Scheduled Meds:   bacitracin zinc   Topical (Top) BID    dexamethasone  4 mg Intravenous Q6H    famotidine  20 mg Oral BID    heparin (porcine)  5,000 Units Subcutaneous Q8H    levetiracetam  1,000 mg Oral BID    modafiniL  100 mg Oral QAM    polyethylene glycol  17 g Oral Daily    senna-docusate 8.6-50 mg  1 tablet Oral BID    tamsulosin  0.4 mg Oral Daily    topiramate  200 mg Oral BID    white petrolatum-mineral oiL   Right Eye BID     PRN Meds:acetaminophen, bisacodyL, dextrose 10%, dextrose 10%, glucagon (human recombinant), glucose, glucose, HYDROcodone-acetaminophen, insulin aspart U-100, ondansetron, ondansetron     Review of Systems  Objective:     Weight: 118.8 kg (262 lb)  Body mass index is 43.6 kg/m².  Vital Signs (Most Recent):  Temp: 97.7 °F (36.5 °C) (02/08/22 0810)  Pulse: 65 (02/08/22 0810)  Resp: 18 (02/08/22 0810)  BP: 112/68 (02/08/22 0810)  SpO2: 99 % (02/08/22 0810) Vital Signs (24h Range):  Temp:  [96.1 °F (35.6 °C)-99 °F (37.2 °C)] 97.7 °F (36.5 °C)  Pulse:  [64-92] 65  Resp:  [18-21] 18  SpO2:  [96 %-100 %] 99 %  BP: (106-119)/(63-83) 112/68                     Female External Urinary Catheter 02/06/22 1100 (Active)   Skin no redness;no breakdown 02/07/22 2000   Tolerance no signs/symptoms of discomfort 02/07/22 2000   Suction Continuous suction at 70 mmHg 02/06/22 2000   Date of last wick change 02/07/22 02/07/22 2000   Time of last wick change 2000 02/07/22 2000   Output (mL) 450 mL 02/06/22 2000       Physical Exam    Neurosurgery Physical Exam  General: well developed, well nourished, no distress.   Head: normocephalic,   Neurologic: Alert  Mental Status: Awake, Alert, Oriented x 2 (person, place, not  year)  Language: expressive aphasia  Speech: dysarthric  Cranial nerves: right facial droop  Eyes:  EOMI.   Pulmonary: normal respirations, no signs of respiratory distress  Sensory: intact to light touch throughout  Motor Strength:No abnormal movements seen. Left side full strength. Plegic right upper, 2/5 KF and 2/5 DF on RLE, otherwise plegic in right lower  Vascular: No LE edema.   Skin: Skin is warm, dry and intact.  Incision: c/d/i with skin edges well approximated with staples. No surrounding erythema or edema. No drainage from incision. No palpable hematoma or underlying fluid collection.     Significant Labs:  Recent Labs   Lab 02/07/22  2342   *   *   K 3.8      CO2 20*   BUN 18   CREATININE 0.8   CALCIUM 8.9     Recent Labs   Lab 02/07/22  2342   WBC 14.09*   HGB 12.1   HCT 37.2        No results for input(s): LABPT, INR, APTT in the last 48 hours.  Microbiology Results (last 7 days)     ** No results found for the last 168 hours. **        All pertinent labs from the last 24 hours have been reviewed.    Significant Diagnostics:  No results found in the last 24 hours.      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 Continue PPI while on steroids   --Continue home AEDs keppra and topiramate  --Aggressive PT/OT/SLP   --DVT ppx: TEDs/SCDs/SQH  --continue modafinal   --HTN: PRN labetalol, hydralazine. SBP <160.   --Bowel regimen: Daily  Miralax.   --Urinary: Smith replaced 2/3. Flomax started. Smith removed Sunday morning, continues to void spontaneously s/p smith removal    Dispo: PT/OT recs for rehab. Pending placement, plan for discharge today. Discharge instructions discussed with patient and family         Meghan Sosa PA-C  Neurosurgery  [facility] - Neurosurgery ([facility])

## 2022-02-08 NOTE — PT/OT/SLP PROGRESS
"Speech Language Pathology Treatment    Patient Name:  Carolyn Mccullough   MRN:  [id number]  Admitting Diagnosis: Localization-related (focal) (partial) symptomatic epilepsy and epileptic syndromes with complex partial seizures, intractable, without status epilepticus    Recommendations:                 General Recommendations:  Dysphagia therapy and Speech/language therapy  Diet recommendations:  Mechanical soft, Liquid Diet Level: Thin   Aspiration Precautions: HOB to 90 degrees, Meds whole 1 at a time, Small bites/sips and Standard aspiration precautions   General Precautions: Standard, fall,aphasia  Communication strategies:  none    Subjective     "Im so glad" pt. Response when told she did well in therapy  Patient goals: home    Pain/Comfort:  · Pain Rating 1: 0/10  · Pain Rating Post-Intervention 1: 0/10    Respiratory Status: Room air    Objective:     Has the patient been evaluated by SLP for swallowing?   Yes  Keep patient NPO? No   Current Respiratory Status:        Pt. Seen at bedside with good attention to task.  Pt. Counted to 10 with 100% accuracy and recalled nouns in response to questions with mod cues with 100% accuracy.  She completed automatic sentences with 100% accuracy and named common objects with 100% accuracy.  She required mod cues to name on category member with 90% accuracy.  Education provided re communication strategies and rehab plan of care     Assessment:     Carolyn Mccullough is a 31 y.o. female with an SLP diagnosis of Aphasia and Dysphagia.   Goals:   Multidisciplinary Problems     SLP Goals        Problem: SLP Goal    Goal Priority Disciplines Outcome   SLP Goal     SLP Ongoing, Progressing   Description: Goals due 2/8  1.  Pt. Will participate in ongoing assessment of swallow  2.  Pt. Will participate in ongoing speech language evaluation  3. Pt will answer yes.no questions with 70% accy and mod cues   4. Pt will follow single step commands with 50% accy and max cues  5. Pt will " utilize speech strategies with mod cues to increase intelligibility  6. Pt will complete rote speech tasks with 50% accy and mod cues                    Plan:     · Patient to be seen:  4 x/week   · Plan of Care expires:  03/01/22  · Plan of Care reviewed with:  patient   · SLP Follow-Up:  Yes       Discharge recommendations:  rehabilitation facility   Barriers to Discharge:  None    Time Tracking:     SLP Treatment Date:   02/08/22  Speech Start Time:  0740  Speech Stop Time:  0756     Speech Total Time (min):  16 min    Billable Minutes: Speech Therapy Individual 8 and Self Care/Home Management Training 8    02/08/2022

## 2022-02-08 NOTE — PLAN OF CARE
Problem: Adult Inpatient Plan of Care  Goal: Plan of Care Review  Outcome: Ongoing, Progressing     Problem: Skin Injury Risk Increased  Goal: Skin Health and Integrity  Outcome: Ongoing, Progressing  Intervention: Optimize Skin Protection  Flowsheets (Taken 2/8/2022 0306)  Pressure Reduction Techniques:   frequent weight shift encouraged   weight shift assistance provided  Pressure Reduction Devices: pressure-redistributing mattress utilized  Skin Protection:   adhesive use limited   incontinence pads utilized   tubing/devices free from skin contact  Head of Bed (HOB) Positioning: HOB elevated     Problem: Communication Impairment (Stroke, Ischemic/Transient Ischemic Attack)  Goal: Improved Communication Skills  Outcome: Ongoing, Progressing  Intervention: Optimize Communication Skills  Flowsheets (Taken 2/8/2022 0306)  Communication Enhancement Strategies:   call light answered in person   extra time allowed for response   repetition utilized     POC reviewed with patient and family who remained at bedside this shift.  Alert but continues to be disoriented to time.  Delayed responses with some word finding difficulty noted.  Respirations unlabored.  Skin w/d.  Left crani incision continues to be MAXWELL with staples intact.  No bleeding/drainage noted.  CBG monitoring in progress with no s/s of hypo/hyperglycemia noted.  VSS.  See flowsheet for full assessment.  No s/s of distress noted.  Fall/safety precautions maintained.

## 2022-02-08 NOTE — PLAN OF CARE
[facility] - Neurosurgery (Hospital)  Discharge Final Note    Primary Care Provider: Fadumo Laughlin MD    Expected Discharge Date: 2/8/2022     Patient to be discharged to [facility].  Care deferred to [facility].  Stretcher requested through [facility].  Neurosurgery clinic to schedule follow up appointment.    Nurse to call report to [phone].  Stretcher requested for 11:00 am which is not a guaranteed arrival time.        Final Discharge Note (most recent)     Final Note - 02/08/22 0920        Final Note    Assessment Type Final Discharge Note     Anticipated Discharge Disposition Rehab Facility        Post-Acute Status    Post-Acute Authorization Placement     Post-Acute Placement Status Set-up Complete/Auth obtained     Discharge Delays None known at this time                 Important Message from Medicare

## 2022-02-08 NOTE — PLAN OF CARE
Problem: Bowel Elimination Impaired (Stroke, Ischemic/Transient Ischemic Attack)  Goal: Effective Bowel Elimination  Outcome: Ongoing, Progressing     Problem: Communication Impairment (Stroke, Ischemic/Transient Ischemic Attack)  Goal: Improved Communication Skills  Outcome: Ongoing, Progressing     Problem: Functional Ability Impaired (Stroke, Ischemic/Transient Ischemic Attack)  Goal: Optimal Functional Ability  Outcome: Ongoing, Progressing     Plan of care discussed with patient and family members. Pt still having difficulty finding words. Became more alert throughout the day. Calm and cooperative with all cares. Able to work with PT and OT this shift. Pt and family understand that the plan is to move to rehab tomorrow.

## 2022-02-08 NOTE — DISCHARGE SUMMARY
Tyrel Oneal - Neurosurgery ([facility])  Neurosurgery  Discharge Summary      Patient Name: Carolyn Mccullough  MRN: [id number]  Admission Date: 1/31/2022  Hospital Length of Stay: 8 days  Discharge Date and Time: 2/8/2022 10:39 AM  Attending Physician: Mayra Iraheta MD  Discharging Provider: Meghan Sosa PA-C  Primary Care Provider: Fadumo Laughlin MD    HPI:   Pt is a 31F with intractable epilepsy who presents on 1/31 for elective left anterior temporal lobectomy for refractory epilepsy.     Dr. Iraheta's Clinic Note (1/20/22):  Carolyn Mccullough is a 31 y.o. female who presents as a referral from Dr. Stapleton to discuss definitive surgery for treatment of intractable epilepsy. She has undergone [facility] at [facility], and Dr. Stapleton has shared all relevant records with us, including raw sEEG data and neuropsycholgical evaluation. We have also directly obtained the images, including post-sEEG CT head, to assess the electrode placement.      Based on these data, together with the additional imaging findings, and after extensive interdisciplinary discussion in conference and with Dr. Stapleton, I have offered the patient a choice between laser ablation of the amygdala and hippocampus or anterior temporal lobectomy. 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 There is a growing body of literature suggesting that encephaloceles are potentially epileptogenic; however, without actually having an electrode in the region, I cannot be sure if it is contributory in her case.      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 We discussed that this will carry an increased risk of neuropsychological risk,  particularly with respect to speech. The patient and her mother expressed understanding. After deliberation, they prefer to proceed with KERLINE      Procedure(s) (LRB):  LEFT CRANIOTOMY FOR TEMPORAL LOBECTOMY WITH BRAINLAB (Left)     Hospital Course: 1/31: POD 0 anterior temporal lobectomy. D/t to somnolence, not moving R side, and L fixed gaze, another 1g of keppra (2g total) was given as well as 4mg dex IV, MRI STAT was performed showing acute infarction involving the left lentiform nucleus. Unfortunately not a candidate for tPA d/t surgery, likely iatrogenic in nature. Upon return from MRI, pt WD R side, still drowsy but not requiring sternal rub. GCS 10, still not gagging on oral airway left in place  2/1: Patient POD 1 s/p anterior temporal lobectomy. Post op CTH with expected post op changes. Post op MRI with acute infarct adjacent and deep to surgical bed, patient with new right hemiparesis and aphasia. Continue AEDs, steroids, subgaleal HV drain, and continue to monitor exam and close montoring in ICU today. Epilepsy following, continue home AEDS.   2/2: CTA overnight unremarkable. Speech with mild improvement, continue aggressive PTOT SLP. Maintain HV drain. Modafinal trial.    2/3: Step down to NSGY. Improved alertness with modafinil. Diet started. HV drain removed. Start SQH.  2/4: NAEON. AFVSS. Patient stepped down to floor care. Smith replaced yesterday due to retention. Flomax started. Plan for smith removal Sunday. Denies headaches, seizures, or new weakness.   2/5: NAEON. Patient neuro stable with right hemiparesis. Remove smith tomorrow for voiding trial. Continue aggressive therapy. PT/OT recommending IPR. Patient medically stable for discharge to IPR pending placement, likely Monday.   2/6 naeon, exam stable, smith removed this am. Pending placement.   2/7: NAEON. Neuro stable. Voiding spontaneously. Pending IPR. Denies headaches, new onset weakness, or seizures.   2/8: NAEON. Right lower extremity  paresis improving. No headaches or seizure activity. Pending IPR, plan for transfer today. Dc instructions discussed with patient's aunt who voiced understanding.       Goals of Care Treatment Preferences:  Code Status: Full Code      Consults: PT/OT/SLP  Consults (From admission, onward)        Status Ordering Provider     Inpatient consult to Registered Dietitian/Nutritionist  Once        Provider:  (Not yet assigned)    Completed BERTHA TILLMAN     Inpatient consult to Vascular (Stroke) Neurology  Once        Provider:  (Not yet assigned)    Completed BERTHA TILLMAN     Inpatient consult to Midline team  Once        Provider:  (Not yet assigned)    Completed REJI PRICE     Inpatient consult to Neurology  Once        Provider:  Raya Vasquez MD PhD    Completed RIGO SUMMERS          Significant Diagnostic Studies: CTA head and neck   MRI brain without contrast  CT head    Pending Diagnostic Studies:     Procedure Component Value Units Date/Time    Specimen to Pathology, Surgery Neurosurgery [[id number]] Collected: 01/31/22 1902    Order Status: Sent Lab Status: In process Updated: 02/01/22 0139        Final Active Diagnoses:    Diagnosis Date Noted POA    PRINCIPAL PROBLEM:  Localization-related (focal) (partial) symptomatic epilepsy and epileptic syndromes with complex partial seizures, intractable, without status epilepticus [G40.219] 01/10/2022 Yes    Acute cerebrovascular accident (CVA) of basal ganglia [I63.9] 02/02/2022 No    History of prediabetes [Z87.898] 01/27/2022 Yes    Essential hypertension [I10] 01/27/2022 Yes    Morbid obesity [E66.01] 01/10/2022 Yes    DRESS syndrome [D72.12, T50.905A] 06/15/2016 Yes      Problems Resolved During this Admission:    Diagnosis Date Noted Date Resolved POA    Thrombotic stroke involving left middle cerebral artery [I63.312] 02/01/2022 02/02/2022 Yes      Discharged Condition: good     Disposition: Rehab Facility    Follow Up: 4 weeks  with NSGY    Patient Instructions:      Notify your health care provider if you experience any of the following:  temperature >100.4     Notify your health care provider if you experience any of the following:  persistent nausea and vomiting or diarrhea     Notify your health care provider if you experience any of the following:  severe uncontrolled pain     Notify your health care provider if you experience any of the following:  redness, tenderness, or signs of infection (pain, swelling, redness, odor or green/yellow discharge around incision site)     Notify your health care provider if you experience any of the following:  difficulty breathing or increased cough     Notify your health care provider if you experience any of the following:  severe persistent headache     Notify your health care provider if you experience any of the following:  worsening rash     Notify your health care provider if you experience any of the following:  persistent dizziness, light-headedness, or visual disturbances     Notify your health care provider if you experience any of the following:  increased confusion or weakness     Activity as tolerated     Medications:  Reconciled Home Medications:      Medication List      START taking these medications    bacitracin zinc 500 unit/gram Oipk  Apply topically 2 (two) times daily.     dexAMETHasone 2 MG tablet  Commonly known as: DECADRON  Take 2 tablets (4 mg total) by mouth every 8 (eight) hours for 3 days, THEN 2 tablets (4 mg total) 2 (two) times daily with meals for 3 days, THEN 1 tablet (2 mg total) every 8 (eight) hours for 3 days, THEN 1 tablet (2 mg total) 2 (two) times a day for 3 days, THEN 1 tablet (2 mg total) once daily for 2 days.  Start taking on: February 9, 2022     famotidine 20 MG tablet  Commonly known as: PEPCID  Take 1 tablet (20 mg total) by mouth 2 (two) times daily. To take while on steroid taper     HYDROcodone-acetaminophen 5-325 mg per tablet  Commonly known  as: NORCO  Take 1 tablet by mouth every 4 (four) hours as needed for Pain.     modafiniL 100 MG Tab  Commonly known as: PROVIGIL  Take 1 tablet (100 mg total) by mouth every morning.     tamsulosin 0.4 mg Cap  Commonly known as: FLOMAX  Take 1 capsule (0.4 mg total) by mouth once daily.     white petrolatum-mineral oiL 94-3 % Oint  Place into the right eye 2 (two) times a day.        CONTINUE taking these medications    acetaminophen 500 MG tablet  Commonly known as: TYLENOL  Take 500 mg by mouth daily as needed for Pain.     levETIRAcetam 1000 MG tablet  Commonly known as: KEPPRA  Take 1,000 mg by mouth 2 (two) times daily.     NAYZILAM 5 mg/spray (0.1 mL) Spry  Generic drug: midazolam  1 spray by Nasal route every 10 (ten) minutes as needed (cluster seizures). Please administer 1 spray after seizures lasting more than 3 minutes or more that 3 seizures in a 24 hour period, may administer a second dose after 10 min if there is no response to the first dose. Use for treatment of no more than 1 episode (1-2 sprays) every three days, no more than 5 episodes (1-2 sprays) in a month     topiramate 200 MG Tab  Commonly known as: TOPAMAX  Take 200 mg by mouth 2 (two) times daily.        STOP taking these medications    BC HEADACHE POWDER ORAL     ibuprofen 800 MG tablet  Commonly known as: MASSIMO KATZ PA-C  Neurosurgery  [facility])

## 2022-02-08 NOTE — SUBJECTIVE & OBJECTIVE
Interval History: NAEON. Right lower extremity paresis improving. No headaches or seizure activity. Pending IPR, plan for transfer today. Dc instructions discussed with patient's aunt who voiced understanding.     Medications:  Continuous Infusions:  Scheduled Meds:   bacitracin zinc   Topical (Top) BID    dexamethasone  4 mg Intravenous Q6H    famotidine  20 mg Oral BID    heparin (porcine)  5,000 Units Subcutaneous Q8H    levetiracetam  1,000 mg Oral BID    modafiniL  100 mg Oral QAM    polyethylene glycol  17 g Oral Daily    senna-docusate 8.6-50 mg  1 tablet Oral BID    tamsulosin  0.4 mg Oral Daily    topiramate  200 mg Oral BID    white petrolatum-mineral oiL   Right Eye BID     PRN Meds:acetaminophen, bisacodyL, dextrose 10%, dextrose 10%, glucagon (human recombinant), glucose, glucose, HYDROcodone-acetaminophen, insulin aspart U-100, ondansetron, ondansetron     Review of Systems  Objective:     Weight: 118.8 kg (262 lb)  Body mass index is 43.6 kg/m².  Vital Signs (Most Recent):  Temp: 97.7 °F (36.5 °C) (02/08/22 0810)  Pulse: 65 (02/08/22 0810)  Resp: 18 (02/08/22 0810)  BP: 112/68 (02/08/22 0810)  SpO2: 99 % (02/08/22 0810) Vital Signs (24h Range):  Temp:  [96.1 °F (35.6 °C)-99 °F (37.2 °C)] 97.7 °F (36.5 °C)  Pulse:  [64-92] 65  Resp:  [18-21] 18  SpO2:  [96 %-100 %] 99 %  BP: (106-119)/(63-83) 112/68                     Female External Urinary Catheter 02/06/22 1100 (Active)   Skin no redness;no breakdown 02/07/22 2000   Tolerance no signs/symptoms of discomfort 02/07/22 2000   Suction Continuous suction at 70 mmHg 02/06/22 2000   Date of last wick change 02/07/22 02/07/22 2000   Time of last wick change 2000 02/07/22 2000   Output (mL) 450 mL 02/06/22 2000       Physical Exam    Neurosurgery Physical Exam  General: well developed, well nourished, no distress.   Head: normocephalic,   Neurologic: Alert  Mental Status: Awake, Alert, Oriented x 2 (person, place, not year)  Language: expressive  aphasia  Speech: dysarthric  Cranial nerves: right facial droop  Eyes:  EOMI.   Pulmonary: normal respirations, no signs of respiratory distress  Sensory: intact to light touch throughout  Motor Strength:No abnormal movements seen. Left side full strength. Plegic right upper, 2/5 KF and 2/5 DF on RLE, otherwise plegic in right lower  Vascular: No LE edema.   Skin: Skin is warm, dry and intact.  Incision: c/d/i with skin edges well approximated with staples. No surrounding erythema or edema. No drainage from incision. No palpable hematoma or underlying fluid collection.     Significant Labs:  Recent Labs   Lab 02/07/22  2342   *   *   K 3.8      CO2 20*   BUN 18   CREATININE 0.8   CALCIUM 8.9     Recent Labs   Lab 02/07/22  2342   WBC 14.09*   HGB 12.1   HCT 37.2        No results for input(s): LABPT, INR, APTT in the last 48 hours.  Microbiology Results (last 7 days)     ** No results found for the last 168 hours. **        All pertinent labs from the last 24 hours have been reviewed.    Significant Diagnostics:  No results found in the last 24 hours.

## 2022-02-10 LAB
FINAL PATHOLOGIC DIAGNOSIS: NORMAL
GROSS: NORMAL
Lab: NORMAL
MICROSCOPIC EXAM: NORMAL

## 2022-02-11 NOTE — PHYSICIAN QUERY
PT Name: Carolyn Mccullough  MR #: [id number]    DOCUMENTATION CLARIFICATION     CDS/: Jaqueline Lazo RN, CDS            Contact information: [email or web address]    This form is a permanent document in the medical record.    Query Date: February 11, 2022    Dear Provider,  By submitting this query, we are merely seeking further clarification of documentation. Please utilize your independent clinical judgment when addressing the question(s) below.      Supporting Clinical Findings   Location in Medical Record     --Technical Procedures Used:   1. Left craniotomy for anterior temporal lobectomy and resection of encephalocele     --Acute stroke of basal ganglia           -Unfortunately not a candidate for tPA d/t surgery, likely iatrogenic in nature.    --noted to have R hemiparesis in PACU.  Currently postop day 1.    --MRI confirmed L BG stroke in addition to expected postoperative changes     --Post-operatively, patient noted to have right hemiparesis, aphasia.  -- MRI brain showing acute infarction involving the left lentiform nucleus  --Acute cerebrovascular accident (CVA) of basal ganglia             - Noted on MRI post-operatively,     Op-Note 1/31      NCC H&P 1/31      Vas Neuro c/s 2/1      Vas neuro Note 2/4         Please clarify the term postoperative as it relates to _Acute cerebrovascular accident (CVA) of basal ganglia  _____________.  [   ] Condition occurred in the postoperative period (not a complication of surgery)   [x ] Condition is a complication of surgery   [   ] Other intended meaning (please specify): ____________________________   [  ] Clinically Undetermined

## 2022-02-11 NOTE — PHYSICIAN QUERY
PT Name: Carolyn Mccullough  MR #: [id number]    DOCUMENTATION CLARIFICATION     CDS/: Jaqueline Lazo RN, CDS               Contact information: [email or web address]  This form is a permanent document in the medical record.     Query Date: February 11, 2022    By submitting this query, we are merely seeking further clarification of documentation.  Please utilize your independent clinical judgment when addressing the question(s) below.    The medical record contains the following:  Pathology Findings Location in Medical Record     --Technical Procedures Used:   1. Left craniotomy for anterior temporal lobectomy and resection of encephalocele     --Final Pathologic Diagnosis Brain, left anterior temporal lobe, craniotomy with resection:          - Findings suggestive of mild-to-moderate chronic hypoxic-ischemic injury, in the setting of clinically identified encephalocele       Op-Note 1/31        Path report 1/31       Please clarify:    [ x] Pathology findings noted above are ruled in/confirmed as diagnoses   [  ] Pathology findings noted above are not confirmed as diagnoses   [  ] Pathology findings noted above are incidental   [  ] Other diagnosis (please specify): ___________   [  ] Clinically Undetermined       Please document in your progress notes daily for the duration of treatment until resolved and include in your discharge summary.    Form No. [id number]

## 2022-02-18 ENCOUNTER — TELEPHONE (OUTPATIENT)
Dept: NEUROSURGERY | Facility: CLINIC | Age: 32
End: 2022-02-18
Payer: MEDICAID

## 2022-02-18 NOTE — TELEPHONE ENCOUNTER
I returned the nurse call and requested that she send a picture of the incision before and after removal. Nurse(Ines)  ----- Message from Gerard Cotton sent at 2/18/2022  3:22 PM CST -----  Regarding: stitches removal  Name of Who is Calling: JESÚS SINGH [[id number]] Pt mother            What is the request in detail: States she had surgery on 01/31, wanted clarification on when to have pt stitches taking out, states daughter is in  rehab ([facility]) and wanted provider to communication with rehab in regards to when its time to remove stitches, pl advise; [phone] nursing desk at rehab            Can the clinic reply by [facility]: no           [facility]: [phone] (Lilibeth)

## 2022-02-21 ENCOUNTER — TELEPHONE (OUTPATIENT)
Dept: NEUROSURGERY | Facility: CLINIC | Age: 32
End: 2022-02-21

## 2022-02-21 NOTE — TELEPHONE ENCOUNTER
Spoke with Garima MICHAELS  ----- Message from Dorothy Osei sent at 2/21/2022 11:57 AM CST -----  Contact: Dr Alexis    [phone]  Dr Alexis would like to speak with Dr Iraheta concerning a mutual pt.  She is currently admitted and we are trying to discharge her back to the Nursing Home today.  Would like to know if she can put pt on an aspirin.  Pls call asap.

## 2022-04-06 ENCOUNTER — OFFICE VISIT (OUTPATIENT)
Dept: NEUROSURGERY | Facility: CLINIC | Age: 32
End: 2022-04-06
Payer: MEDICAID

## 2022-04-06 VITALS
SYSTOLIC BLOOD PRESSURE: 101 MMHG | DIASTOLIC BLOOD PRESSURE: 61 MMHG | HEIGHT: 65 IN | WEIGHT: 260 LBS | BODY MASS INDEX: 43.32 KG/M2 | TEMPERATURE: 99 F | HEART RATE: 90 BPM

## 2022-04-06 DIAGNOSIS — G40.219 LOCALIZATION-RELATED (FOCAL) (PARTIAL) SYMPTOMATIC EPILEPSY AND EPILEPTIC SYNDROMES WITH COMPLEX PARTIAL SEIZURES, INTRACTABLE, WITHOUT STATUS EPILEPTICUS: Primary | ICD-10-CM

## 2022-04-06 PROCEDURE — 3008F PR BODY MASS INDEX (BMI) DOCUMENTED: ICD-10-PCS | Mod: CPTII,,, | Performed by: NEUROLOGICAL SURGERY

## 2022-04-06 PROCEDURE — 3074F PR MOST RECENT SYSTOLIC BLOOD PRESSURE < 130 MM HG: ICD-10-PCS | Mod: CPTII,,, | Performed by: NEUROLOGICAL SURGERY

## 2022-04-06 PROCEDURE — 3008F BODY MASS INDEX DOCD: CPT | Mod: CPTII,,, | Performed by: NEUROLOGICAL SURGERY

## 2022-04-06 PROCEDURE — 99999 PR PBB SHADOW E&M-EST. PATIENT-LVL II: CPT | Mod: PBBFAC,,, | Performed by: NEUROLOGICAL SURGERY

## 2022-04-06 PROCEDURE — 99024 POSTOP FOLLOW-UP VISIT: CPT | Mod: ,,, | Performed by: NEUROLOGICAL SURGERY

## 2022-04-06 PROCEDURE — 3078F PR MOST RECENT DIASTOLIC BLOOD PRESSURE < 80 MM HG: ICD-10-PCS | Mod: CPTII,,, | Performed by: NEUROLOGICAL SURGERY

## 2022-04-06 PROCEDURE — 3044F PR MOST RECENT HEMOGLOBIN A1C LEVEL <7.0%: ICD-10-PCS | Mod: CPTII,,, | Performed by: NEUROLOGICAL SURGERY

## 2022-04-06 PROCEDURE — 3074F SYST BP LT 130 MM HG: CPT | Mod: CPTII,,, | Performed by: NEUROLOGICAL SURGERY

## 2022-04-06 PROCEDURE — 99212 OFFICE O/P EST SF 10 MIN: CPT | Mod: PBBFAC | Performed by: NEUROLOGICAL SURGERY

## 2022-04-06 PROCEDURE — 3044F HG A1C LEVEL LT 7.0%: CPT | Mod: CPTII,,, | Performed by: NEUROLOGICAL SURGERY

## 2022-04-06 PROCEDURE — 99999 PR PBB SHADOW E&M-EST. PATIENT-LVL II: ICD-10-PCS | Mod: PBBFAC,,, | Performed by: NEUROLOGICAL SURGERY

## 2022-04-06 PROCEDURE — 3078F DIAST BP <80 MM HG: CPT | Mod: CPTII,,, | Performed by: NEUROLOGICAL SURGERY

## 2022-04-06 PROCEDURE — 99024 PR POST-OP FOLLOW-UP VISIT: ICD-10-PCS | Mod: ,,, | Performed by: NEUROLOGICAL SURGERY

## 2022-04-06 NOTE — PROGRESS NOTES
Neurosurgery  History & Physical    SUBJECTIVE:     Chief Complaint: intractable epilepsy     History of Present Illness:  Carolyn Mccullough is a 31 y.o. right-handed female who presents as a referral from Dr. Kyle Stapleton for intractable left temporal lobe epilepsy. The patient has undergone extensive workup, including sEEG electrode placement at [facility] here in [facility]  that demonstrated all seizures with a mesial temporal onset. I have personally reviewed the EEG data (together with Chelo Stapleton and Pedro) from that time with the patient's permission, and I have also reviewed the patient's intracranial electrode placement plan and re-modeled it in DAMIAN. Today is my first time actually meeting the patient. Please see Dr. Vasquez's notes for extensive history review and thorough reporting of outside hospital records. She has failed multiple previous AEDs.     The patient presents with her mother and her daughter, who is developmentally delayed. The patient's mother, Karina, provides most of the history. The family lives together in [place], [place], which is approximately 2 hours away.     Karina reports that Carolyn experiences about 3 events/months presently. She endorses 2 semiologies: in the first, Carolyn's eyes roll back, she then experiences all-over shaking. She also sometimes has staring with drooling episodes.     Seizure onset was in . They initially started while she was asleep, but now happen at all times of day. They are now moreso when awake than asleep, which is particularly difficult because the seizures interfere with normal activities, such as shopping.     The patient was born via  at term. Karina denies any childhood infections, trauma, or illness. Carolyn graduated from high school and finished one semester of college. She is not currently working; last worked in  in the [employer]    Her goal for surgery is to reduce seizures.      Empty sella configuration [facility], so I asked the patient about IIH symptoms: she endorses occasional headaches, but usually only after seizures. There is no headache associated with exertional activities otherwise.    The patient denies any bleeding, infectious, or anesthetic complications with any previous surgery.  She had an allergic reaction to CT contrast and also to tegretol. She is not currently taking ibuprofen.     As of 4/6/22, the patient underwent left temporal lobectomy on 1/31/22. Unfortunately, she had a perioperative stroke. She has been seizure-free since the surgery. She was discharged from inpatient rehab over the weekend. She is able to walk with an AFO, though her right hand remains week. Her speech is near baseline. She denies any fever, chills, or systemic signs of infection. She has a small area of scabbing near the front of the wound. On my inspection, this seems to be quite superficial.     Review of patient's allergies indicates:   Allergen Reactions    Carbamazepine Rash    Iodinated contrast media Dermatitis    Escitalopram Rash       Current Outpatient Medications   Medication Sig Dispense Refill    acetaminophen (TYLENOL) 500 MG tablet Take 500 mg by mouth daily as needed for Pain.      bacitracin zinc 500 unit/gram OiPk Apply topically 2 (two) times daily.      famotidine (PEPCID) 20 MG tablet Take 1 tablet (20 mg total) by mouth 2 (two) times daily. To take while on steroid taper 60 tablet 11    HYDROcodone-acetaminophen (NORCO) 5-325 mg per tablet Take 1 tablet by mouth every 4 (four) hours as needed for Pain.  0    levETIRAcetam (KEPPRA) 1000 MG tablet Take 1,000 mg by mouth 2 (two) times daily.      midazolam (NAYZILAM) 5 mg/spray (0.1 mL) Spry 1 spray by Nasal route every 10 (ten) minutes as needed (cluster seizures). Please administer 1 spray after seizures lasting more than 3 minutes or more that 3 seizures in a 24 hour period, may  administer a second dose after 10 min if there is no response to the first dose. Use for treatment of no more than 1 episode (1-2 sprays) every three days, no more than 5 episodes (1-2 sprays) in a month 4 each 3    tamsulosin (FLOMAX) 0.4 mg Cap Take 1 capsule (0.4 mg total) by mouth once daily. 30 capsule 11    topiramate (TOPAMAX) 200 MG Tab Take 200 mg by mouth 2 (two) times daily.      white petrolatum-mineral oiL 94-3 % Oint Place into the right eye 2 (two) times a day.  0     No current facility-administered medications for this visit.       Past Medical History:   Diagnosis Date    Seizures      Past Surgical History:   Procedure Laterality Date    BRAIN SURGERY      sEEG left [facility]     SECTION      SURGICAL REMOVAL OF LOBE OF BRAIN Left 2022    Procedure: LEFT CRANIOTOMY FOR TEMPORAL LOBECTOMY WITH BRAINLAB;  Surgeon: Mayra Iraheta MD;  Location: [facility] OR [address] [place] [address];  Service: Neurosurgery;  Laterality: Left;  TORONTO III, ASA III, BLOOD TYPE &CROSS 2UNITS, HEADREST LUCIANO, SUPINE POSITION, BRAINLAB, REGULAR BED    TUBAL LIGATION      Robbin 4 years ago     Family History     Problem Relation (Age of Onset)    Diabetes Mellitus Mother, Brother        Social History     Socioeconomic History    Marital status: Legally    Tobacco Use    Smoking status: Never Smoker    Smokeless tobacco: Never Used   Substance and Sexual Activity    Alcohol use: No     Comment: one drink in a few months time     Drug use: Never       Review of Systems   Constitutional: Negative for fever.   HENT: Negative for nosebleeds.    Eyes: Negative for visual disturbance.   Respiratory: Negative for shortness of breath.    Cardiovascular: Negative for chest pain.   Gastrointestinal: Negative for nausea and vomiting.   Endocrine: Negative for cold intolerance.   Genitourinary: Negative for difficulty urinating.   Musculoskeletal: Negative for neck pain.   Skin: Negative for  color change.   Neurological: Positive for seizures.   Hematological: Does not bruise/bleed easily.   Psychiatric/Behavioral: Negative for dysphoric mood. The patient is not nervous/anxious.        OBJECTIVE:     Vital Signs     There is no height or weight on file to calculate BMI.      Physical Exam:    Constitutional: She appears well-developed and well-nourished. No distress.     Eyes: EOM are normal.     Abdominal: Soft.     Skin: Skin displays no rash on extremities. Skin displays no lesions on extremities.   Small area of scabbing near front of the wound      Psych/Behavior: She is alert. She is oriented to person, place, and time.     Musculoskeletal:        Right Upper Extremities: Muscle strength is 1/5.        Left Upper Extremities: Muscle strength is 5/5.       Right Lower Extremities: Muscle strength is 4/5.        Left Lower Extremities: Muscle strength is 5/5.     Neurological:        Coordination: She has abnormal finger to nose coordination.     Pulmonary: nonlabored respirations     Hematologic: no bruising noted     Diagnostic Results:  No new     ASSESSMENT/PLAN:     Carolyn Mccullough is a 31 y.o. female who presents as a referral from Dr. Stapleton to discuss definitive surgery for treatment of intractable epilepsy. She has undergone extensive workup at [facility], and Dr. Stapleton has shared all relevant records with us, including raw sEEG data and neuropsycholgical evaluation. We have also directly obtained the images, including post-sEEG CT head, to assess the electrode placement. 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    She has been seizure-free since surgery. I would like her to continue aggressive rehabilitation. I would like her to apply Medi-Honey sparingly to the small area of open skin underlying the scab until the wound epithelializes completely.     Work and school notes (for Karina and Charis) were provided,  together with a handicap parking tag (temporary).     I would like to see her back in approximately 6 weeks to follow on her progress and wound healing.     I have encouraged the patient to contact the clinic in interim with any questions, concerns, or adverse clinical change.

## 2022-05-18 ENCOUNTER — TELEPHONE (OUTPATIENT)
Dept: NEUROLOGY | Facility: CLINIC | Age: 32
End: 2022-05-18
Payer: MEDICAID

## 2022-05-19 NOTE — TELEPHONE ENCOUNTER
Carolyn has been prescribed Neudexta by another provider and patient's mother says a PA is needed, I explained she needs to reach out to that provider's office and let them know. I cannot submit an auth for medication not prescribed through this clinic. Verbalized understanding

## 2022-05-26 ENCOUNTER — OFFICE VISIT (OUTPATIENT)
Dept: NEUROSURGERY | Facility: CLINIC | Age: 32
End: 2022-05-26
Payer: MEDICAID

## 2022-05-26 VITALS
HEART RATE: 96 BPM | TEMPERATURE: 99 F | HEIGHT: 65 IN | DIASTOLIC BLOOD PRESSURE: 78 MMHG | BODY MASS INDEX: 44.56 KG/M2 | SYSTOLIC BLOOD PRESSURE: 118 MMHG | WEIGHT: 267.44 LBS

## 2022-05-26 DIAGNOSIS — G40.219 LOCALIZATION-RELATED (FOCAL) (PARTIAL) SYMPTOMATIC EPILEPSY AND EPILEPTIC SYNDROMES WITH COMPLEX PARTIAL SEIZURES, INTRACTABLE, WITHOUT STATUS EPILEPTICUS: Primary | ICD-10-CM

## 2022-05-26 PROCEDURE — 99024 PR POST-OP FOLLOW-UP VISIT: ICD-10-PCS | Mod: ,,, | Performed by: NEUROLOGICAL SURGERY

## 2022-05-26 PROCEDURE — 99999 PR PBB SHADOW E&M-EST. PATIENT-LVL III: CPT | Mod: PBBFAC,,, | Performed by: NEUROLOGICAL SURGERY

## 2022-05-26 PROCEDURE — 3078F DIAST BP <80 MM HG: CPT | Mod: CPTII,,, | Performed by: NEUROLOGICAL SURGERY

## 2022-05-26 PROCEDURE — 3074F PR MOST RECENT SYSTOLIC BLOOD PRESSURE < 130 MM HG: ICD-10-PCS | Mod: CPTII,,, | Performed by: NEUROLOGICAL SURGERY

## 2022-05-26 PROCEDURE — 99213 OFFICE O/P EST LOW 20 MIN: CPT | Mod: PBBFAC | Performed by: NEUROLOGICAL SURGERY

## 2022-05-26 PROCEDURE — 3044F PR MOST RECENT HEMOGLOBIN A1C LEVEL <7.0%: ICD-10-PCS | Mod: CPTII,,, | Performed by: NEUROLOGICAL SURGERY

## 2022-05-26 PROCEDURE — 99999 PR PBB SHADOW E&M-EST. PATIENT-LVL III: ICD-10-PCS | Mod: PBBFAC,,, | Performed by: NEUROLOGICAL SURGERY

## 2022-05-26 PROCEDURE — 3044F HG A1C LEVEL LT 7.0%: CPT | Mod: CPTII,,, | Performed by: NEUROLOGICAL SURGERY

## 2022-05-26 PROCEDURE — 3008F PR BODY MASS INDEX (BMI) DOCUMENTED: ICD-10-PCS | Mod: CPTII,,, | Performed by: NEUROLOGICAL SURGERY

## 2022-05-26 PROCEDURE — 3008F BODY MASS INDEX DOCD: CPT | Mod: CPTII,,, | Performed by: NEUROLOGICAL SURGERY

## 2022-05-26 PROCEDURE — 3074F SYST BP LT 130 MM HG: CPT | Mod: CPTII,,, | Performed by: NEUROLOGICAL SURGERY

## 2022-05-26 PROCEDURE — 3078F PR MOST RECENT DIASTOLIC BLOOD PRESSURE < 80 MM HG: ICD-10-PCS | Mod: CPTII,,, | Performed by: NEUROLOGICAL SURGERY

## 2022-05-26 PROCEDURE — 99024 POSTOP FOLLOW-UP VISIT: CPT | Mod: ,,, | Performed by: NEUROLOGICAL SURGERY

## 2022-05-26 NOTE — PROGRESS NOTES
Neurosurgery  History & Physical    SUBJECTIVE:     Chief Complaint: intractable epilepsy     History of Present Illness:  Carolyn Mccullough is a 31 y.o. right-handed female who presents as a referral from Dr. Kyle Stapleton for intractable left temporal lobe epilepsy. The patient has undergone extensive workup, including sEEG electrode placement at [facility] here in [facility]  that demonstrated all seizures with a mesial temporal onset. I have personally reviewed the EEG data (together with Chelo Stapleton and Pedro) from that time with the patient's permission, and I have also reviewed the patient's intracranial electrode placement plan and re-modeled it in DAMIAN. Today is my first time actually meeting the patient. Please see Dr. Vasquez's notes for extensive history review and thorough reporting of outside hospital records. She has failed multiple previous AEDs.     The patient presents with her mother and her daughter, who is developmentally delayed. The patient's mother, Karina, provides most of the history. The family lives together in [place], [place], which is approximately 2 hours away.     Karina reports that Carolyn experiences about 3 events/months presently. She endorses 2 semiologies: in the first, Carolyn's eyes roll back, she then experiences all-over shaking. She also sometimes has staring with drooling episodes.     Seizure onset was in . They initially started while she was asleep, but now happen at all times of day. They are now moreso when awake than asleep, which is particularly difficult because the seizures interfere with normal activities, such as shopping.     The patient was born via  at term. Karina denies any childhood infections, trauma, or illness. Carolyn graduated from high school and finished one semester of college. She is not currently working; last worked in  in the [employer]    Her goal for surgery is to reduce seizures.      Empty sella configuration [facility], so I asked the patient about IIH symptoms: she endorses occasional headaches, but usually only after seizures. There is no headache associated with exertional activities otherwise.    The patient denies any bleeding, infectious, or anesthetic complications with any previous surgery.  She had an allergic reaction to CT contrast and also to tegretol. She is not currently taking ibuprofen.     As of 4/6/22, the patient underwent left temporal lobectomy on 1/31/22. Unfortunately, she had a perioperative stroke. She has been seizure-free since the surgery. She was discharged from inpatient rehab over the weekend. She is able to walk with an AFO, though her right hand remains week. Her speech is near baseline. She denies any fever, chills, or systemic signs of infection. She has a small area of scabbing near the front of the wound. On my inspection, this seems to be quite superficial.     As of 5/26/22, the patient returns in follow up with her daughter and aunt. She has been making remarkable progress in rehab and is now walking with the assistance of an AFO. She has continued right arm weakness, but she has antigravity strength. Her right hand remains weak.     She is communicating adequately. She has not had any seizure since surgery.     Review of patient's allergies indicates:   Allergen Reactions    Carbamazepine Rash    Iodinated contrast media Dermatitis    Escitalopram Rash       Current Outpatient Medications   Medication Sig Dispense Refill    acetaminophen (TYLENOL) 500 MG tablet Take 500 mg by mouth daily as needed for Pain.      bacitracin zinc 500 unit/gram OiPk Apply topically 2 (two) times daily.      famotidine (PEPCID) 20 MG tablet Take 1 tablet (20 mg total) by mouth 2 (two) times daily. To take while on steroid taper 60 tablet 11    HYDROcodone-acetaminophen (NORCO) 5-325 mg per tablet Take 1 tablet by mouth every 4 (four)  hours as needed for Pain.  0    levETIRAcetam (KEPPRA) 1000 MG tablet Take 1,000 mg by mouth 2 (two) times daily.      midazolam (NAYZILAM) 5 mg/spray (0.1 mL) Spry 1 spray by Nasal route every 10 (ten) minutes as needed (cluster seizures). Please administer 1 spray after seizures lasting more than 3 minutes or more that 3 seizures in a 24 hour period, may administer a second dose after 10 min if there is no response to the first dose. Use for treatment of no more than 1 episode (1-2 sprays) every three days, no more than 5 episodes (1-2 sprays) in a month 4 each 3    tamsulosin (FLOMAX) 0.4 mg Cap Take 1 capsule (0.4 mg total) by mouth once daily. 30 capsule 11    topiramate (TOPAMAX) 200 MG Tab Take 200 mg by mouth 2 (two) times daily.      white petrolatum-mineral oiL 94-3 % Oint Place into the right eye 2 (two) times a day.  0     No current facility-administered medications for this visit.       Past Medical History:   Diagnosis Date    Seizures      Past Surgical History:   Procedure Laterality Date    BRAIN SURGERY      sEEG left [facility]     SECTION      SURGICAL REMOVAL OF LOBE OF BRAIN Left 2022    Procedure: LEFT CRANIOTOMY FOR TEMPORAL LOBECTOMY WITH BRAINLAB;  Surgeon: Mayra Iraheta MD;  Location: [facility] OR [address] [place] [address];  Service: Neurosurgery;  Laterality: Left;  TORONTO III, ASA III, BLOOD TYPE &CROSS 2UNITS, HEADREST LUCIANO, SUPINE POSITION, BRAINLAB, REGULAR BED    TUBAL LIGATION      Robbin 4 years ago     Family History     Problem Relation (Age of Onset)    Diabetes Mellitus Mother, Brother        Social History     Socioeconomic History    Marital status: Legally    Tobacco Use    Smoking status: Never Smoker    Smokeless tobacco: Never Used   Substance and Sexual Activity    Alcohol use: No     Comment: one drink in a few months time     Drug use: Never       Review of Systems   Constitutional: Negative for fever.   HENT: Negative for  nosebleeds.    Eyes: Negative for visual disturbance.   Respiratory: Negative for shortness of breath.    Cardiovascular: Negative for chest pain.   Gastrointestinal: Negative for nausea and vomiting.   Endocrine: Negative for cold intolerance.   Genitourinary: Negative for difficulty urinating.   Musculoskeletal: Negative for neck pain.   Skin: Negative for color change.   Neurological: Positive for seizures.   Hematological: Does not bruise/bleed easily.   Psychiatric/Behavioral: Negative for dysphoric mood. The patient is not nervous/anxious.        OBJECTIVE:     Vital Signs     There is no height or weight on file to calculate BMI.      Physical Exam:    Constitutional: She appears well-developed and well-nourished. No distress.     Eyes: EOM are normal.     Abdominal: Soft.     Skin: Skin displays no rash on extremities. Skin displays no lesions on extremities.   Incision well healed      Psych/Behavior: She is alert. She is oriented to person, place, and time.     Musculoskeletal:        Right Upper Extremities: Muscle strength is 3/5.        Left Upper Extremities: Muscle strength is 5/5.       Right Lower Extremities: Muscle strength is 4/5.        Left Lower Extremities: Muscle strength is 5/5.      Comments: R hand 1/5     Neurological:        Coordination: She has abnormal finger to nose coordination.     Pulmonary: nonlabored respirations     Hematologic: no bruising noted     Diagnostic Results:  No new     ASSESSMENT/PLAN:     Carolyn Mccullough is a 31 y.o. female who presents as a referral from Dr. Stapleton to discuss definitive surgery for treatment of intractable epilepsy. She has undergone extensive workup at [facility], and Dr. Stapleton has shared all relevant records with us, including raw sEEG data and neuropsycholgical evaluation. We have also directly obtained the images, including post-sEEG CT head, to assess the electrode placement. She underwent left anterior temporal  lobectomy with resection of the encephalocele on 1/31/22.    She has been seizure-free since surgery. I would like her to continue aggressive rehabilitation. She may now wash and style her hair however she would like.      I would like to see her back in approximately 6 months postop with MRI of the brain to follow on her progress. I am happy to authorize any further PT/rehab needs for her in interim.     I have encouraged the patient to contact the clinic in interim with any questions, concerns, or adverse clinical change.

## 2023-05-21 NOTE — TELEPHONE ENCOUNTER
----- Message from Seema Jones MA sent at 5/18/2022  5:10 PM CDT -----  Regarding: MEDICATION  Contact: Karina  Sent to the wrong basket  ----- Message -----  From: Nuris Cunha MA  Sent: 5/16/2022   1:09 PM CDT  To: Seema Jones MA      ----- Message -----  From: Karyna Francis  Sent: 5/13/2022   3:31 PM CDT  To: Myrtle Nunez Staff    Who Called: PT  Regarding: Karina the pt's mom called to speak with the provider Nuedexta 20-10 mg and is requesting a callback.   Would the patient rather a call back or a response via [facility]? Call back  Best Call Back Number: [phone]  Additional Information:             No indicators present

## 2023-10-25 NOTE — ASSESSMENT & PLAN NOTE
Pt is 31F with temporal epilepsy who is now s/p left craniotomy for anterior temporal lobectomy 2/1:    --Admitted to floor care   -Q4h neurochecks   -- All diagnostics reviewed   -CTH post op satisfactory   -MRI brain 2/1 with expected changes as well as acute left basal ganglia infarct   -CTA 2/1 redemonstrated left basal ganglia infarct without hemorrhagic conversion  --Continue dex 4q6, GI PPx while on steroids  --Continue home AEDs keppra and topiramate  --Aggressive PT/OT/SLP   --DVT ppx: TEDs/SCDs/SQH  --continue modafinal   --HTN: PRN labetalol, hydralazine. SBP <160.   --Bowel regimen: Daily Miralax. Lactulose tday. CTM for bowel movement.  --Urinary: Smith replaced 2/3. Flomax started. Plan for smith removal Sunday.    Dispo: PT/OT recs for rehab. Pending placement.   hard copy, drawn during this pregnancy

## (undated) DEVICE — DURASEAL

## (undated) DEVICE — KIT EVACUATOR 3-SPRING 1/8 DRN

## (undated) DEVICE — DRAPE INCISE IOBAN 2 23X17IN

## (undated) DEVICE — SEE MEDLINE ITEM 156905

## (undated) DEVICE — CORD BIPOLAR 12 FOOT

## (undated) DEVICE — TIP SONAPET BARACUDA

## (undated) DEVICE — GAUZE SPONGE 4X4 12PLY

## (undated) DEVICE — SPRAY MASTISOL

## (undated) DEVICE — DRAPE STERI-DRAPE 1000 17X11IN

## (undated) DEVICE — STAPLER SKIN PROXIMATE WIDE

## (undated) DEVICE — SUT MONOCRYL 4-0 PS-2

## (undated) DEVICE — SUT D SPECIAL VICRYL 2-0

## (undated) DEVICE — HOOK LONE STAR BLUNT 12MM

## (undated) DEVICE — SEE MEDLINE ITEM 157103

## (undated) DEVICE — SPONGE DERMACEA GAUZE 4X4

## (undated) DEVICE — TAPE SURG MEDIPORE 6X72IN

## (undated) DEVICE — MARKER SKIN STND TIP BLUE BARR

## (undated) DEVICE — KIT SURGIFLO HEMOSTATIC MATRIX

## (undated) DEVICE — CARTRIDGE OIL

## (undated) DEVICE — SOL LR INJ 1000ML BG

## (undated) DEVICE — Device

## (undated) DEVICE — ELECTRODE BLADE INSULATED 1 IN

## (undated) DEVICE — DRAPE OPMI STERILE

## (undated) DEVICE — DRESSING SURGICAL 1/2X1/2

## (undated) DEVICE — BANDAGE MATRIX HK LOOP 2IN 5YD

## (undated) DEVICE — CONTAINER SPECIMEN STRL 4OZ

## (undated) DEVICE — TIP SONAPET BARACUDA LG

## (undated) DEVICE — SPONGE NEURO 1/4X1/4

## (undated) DEVICE — DRAPE THYROID WITH ARMBOARD

## (undated) DEVICE — SUT 4/0 18IN NUROLON BLK B

## (undated) DEVICE — DRESSING SURGICAL 3/4X3/4

## (undated) DEVICE — SUT VICRYL PLUS 3-0 SH 18IN

## (undated) DEVICE — TIP SONAPET STRAIGHT

## (undated) DEVICE — DRESSING TELFA STRL 4X3 LF

## (undated) DEVICE — COTTON BALLS 1/2IN

## (undated) DEVICE — ROUTER TAPERED 2.3MM

## (undated) DEVICE — SEE MEDLINE ITEM 157131

## (undated) DEVICE — SET SONAPET TUBING W/EXT

## (undated) DEVICE — DRESSING SURGICAL 1X1

## (undated) DEVICE — SPONGE PATTY SURGICAL .5X3IN

## (undated) DEVICE — DURAPREP SURG SCRUB 26ML

## (undated) DEVICE — TIP SONAPET STRAIGHT LG DIA

## (undated) DEVICE — TRAY FOLEY 16FR INFECTION CONT

## (undated) DEVICE — MARKERS SPHERZ PASSIVE

## (undated) DEVICE — ELECTRODE REM PLYHSV RETURN 9

## (undated) DEVICE — SPONGE GAUZE 16PLY 4X4

## (undated) DEVICE — ROUTER STRAIGHT 1.1 X6.0MM

## (undated) DEVICE — DRESSING TELFA N ADH 3X8

## (undated) DEVICE — DRESSING SURGICAL 1X3

## (undated) DEVICE — DRAPE STERI INSTRUMENT 1018

## (undated) DEVICE — NDL N SERIES MICRO-DISSECTION

## (undated) DEVICE — DIFFUSER

## (undated) DEVICE — TUBE FRAZIER 5MM 2FT SOFT TIP

## (undated) DEVICE — HEMOSTAT SURGICEL 4X8IN